# Patient Record
Sex: FEMALE | NOT HISPANIC OR LATINO
[De-identification: names, ages, dates, MRNs, and addresses within clinical notes are randomized per-mention and may not be internally consistent; named-entity substitution may affect disease eponyms.]

---

## 2021-08-16 PROBLEM — Z00.00 ENCOUNTER FOR PREVENTIVE HEALTH EXAMINATION: Status: ACTIVE | Noted: 2021-08-16

## 2021-08-18 ENCOUNTER — APPOINTMENT (OUTPATIENT)
Dept: GYNECOLOGIC ONCOLOGY | Facility: CLINIC | Age: 81
End: 2021-08-18
Payer: MEDICARE

## 2021-08-18 ENCOUNTER — LABORATORY RESULT (OUTPATIENT)
Age: 81
End: 2021-08-18

## 2021-08-18 ENCOUNTER — RESULT REVIEW (OUTPATIENT)
Age: 81
End: 2021-08-18

## 2021-08-18 VITALS
SYSTOLIC BLOOD PRESSURE: 156 MMHG | DIASTOLIC BLOOD PRESSURE: 105 MMHG | BODY MASS INDEX: 34.63 KG/M2 | HEIGHT: 58 IN | HEART RATE: 66 BPM | WEIGHT: 165 LBS

## 2021-08-18 DIAGNOSIS — I10 ESSENTIAL (PRIMARY) HYPERTENSION: ICD-10-CM

## 2021-08-18 DIAGNOSIS — E78.5 HYPERLIPIDEMIA, UNSPECIFIED: ICD-10-CM

## 2021-08-18 DIAGNOSIS — B37.2 CANDIDIASIS OF SKIN AND NAIL: ICD-10-CM

## 2021-08-18 DIAGNOSIS — Z78.9 OTHER SPECIFIED HEALTH STATUS: ICD-10-CM

## 2021-08-18 DIAGNOSIS — F03.90 UNSPECIFIED DEMENTIA W/OUT BEHAVIORAL DISTURBANCE: ICD-10-CM

## 2021-08-18 DIAGNOSIS — D61.818 OTHER PANCYTOPENIA: ICD-10-CM

## 2021-08-18 LAB
APTT BLD: 29.6 SEC
CANCER AG125 SERPL-ACNC: 90 U/ML
INR PPP: 1 RATIO
PT BLD: 11.8 SEC

## 2021-08-18 PROCEDURE — 99205 OFFICE O/P NEW HI 60 MIN: CPT

## 2021-08-18 RX ORDER — AMITRIPTYLINE HYDROCHLORIDE 75 MG/1
TABLET, FILM COATED ORAL
Refills: 0 | Status: ACTIVE | COMMUNITY

## 2021-08-18 RX ORDER — ASPIRIN 81 MG
81 TABLET, DELAYED RELEASE (ENTERIC COATED) ORAL
Refills: 0 | Status: ACTIVE | COMMUNITY

## 2021-08-18 RX ORDER — PERPHENAZINE 4 MG/1
4 TABLET ORAL
Refills: 0 | Status: ACTIVE | COMMUNITY

## 2021-08-18 RX ORDER — CHROMIUM 200 MCG
TABLET ORAL
Refills: 0 | Status: ACTIVE | COMMUNITY

## 2021-08-18 RX ORDER — LORAZEPAM 2 MG/1
TABLET ORAL
Refills: 0 | Status: ACTIVE | COMMUNITY

## 2021-08-18 RX ORDER — RABEPRAZOLE SODIUM 20 MG/1
TABLET, DELAYED RELEASE ORAL
Refills: 0 | Status: ACTIVE | COMMUNITY

## 2021-08-18 RX ORDER — CALCIUM CARBONATE 600 MG
TABLET ORAL
Refills: 0 | Status: ACTIVE | COMMUNITY

## 2021-08-18 RX ORDER — ATORVASTATIN CALCIUM 80 MG/1
TABLET, FILM COATED ORAL
Refills: 0 | Status: ACTIVE | COMMUNITY

## 2021-08-18 RX ORDER — NYSTATIN 100000 1/G
100000 POWDER TOPICAL
Qty: 60 | Refills: 2 | Status: ACTIVE | COMMUNITY
Start: 2021-08-18 | End: 1900-01-01

## 2021-08-18 RX ORDER — MULTIVIT-MIN/FA/LYCOPEN/LUTEIN .4-300-25
TABLET ORAL
Refills: 0 | Status: ACTIVE | COMMUNITY

## 2021-08-18 RX ORDER — DONEPEZIL HYDROCHLORIDE 23 MG/1
TABLET, FILM COATED ORAL
Refills: 0 | Status: ACTIVE | COMMUNITY

## 2021-08-18 RX ORDER — MELOXICAM 15 MG/1
TABLET ORAL
Refills: 0 | Status: ACTIVE | COMMUNITY

## 2021-08-19 LAB
ALBUMIN SERPL ELPH-MCNC: 4 G/DL
ALP BLD-CCNC: 110 U/L
ALT SERPL-CCNC: 17 U/L
ANION GAP SERPL CALC-SCNC: 14 MMOL/L
AST SERPL-CCNC: 40 U/L
BASOPHILS # BLD AUTO: 0.05 K/UL
BASOPHILS NFR BLD AUTO: 1.4 %
BILIRUB SERPL-MCNC: 0.7 MG/DL
BUN SERPL-MCNC: 17 MG/DL
CALCIUM SERPL-MCNC: 9.6 MG/DL
CHLORIDE SERPL-SCNC: 102 MMOL/L
CO2 SERPL-SCNC: 20 MMOL/L
CREAT SERPL-MCNC: 1.2 MG/DL
EOSINOPHIL # BLD AUTO: 0.09 K/UL
EOSINOPHIL NFR BLD AUTO: 2.5 %
GLUCOSE SERPL-MCNC: 93 MG/DL
HCT VFR BLD CALC: 38.1 %
HGB BLD-MCNC: 11.8 G/DL
IMM GRANULOCYTES NFR BLD AUTO: 0.8 %
LYMPHOCYTES # BLD AUTO: 0.99 K/UL
LYMPHOCYTES NFR BLD AUTO: 27 %
MAN DIFF?: NORMAL
MCHC RBC-ENTMCNC: 31 GM/DL
MCHC RBC-ENTMCNC: 33 PG
MCV RBC AUTO: 106.4 FL
MONOCYTES # BLD AUTO: 0.11 K/UL
MONOCYTES NFR BLD AUTO: 3 %
NEUTROPHILS # BLD AUTO: 2.4 K/UL
NEUTROPHILS NFR BLD AUTO: 65.3 %
PLATELET # BLD AUTO: 227 K/UL
POTASSIUM SERPL-SCNC: 4.8 MMOL/L
PROT SERPL-MCNC: 6.5 G/DL
RBC # BLD: 3.58 M/UL
RBC # FLD: 17.8 %
SODIUM SERPL-SCNC: 136 MMOL/L
WBC # FLD AUTO: 3.67 K/UL

## 2021-08-27 ENCOUNTER — APPOINTMENT (OUTPATIENT)
Dept: ULTRASOUND IMAGING | Facility: IMAGING CENTER | Age: 81
End: 2021-08-27
Payer: MEDICARE

## 2021-08-27 ENCOUNTER — TRANSCRIPTION ENCOUNTER (OUTPATIENT)
Age: 81
End: 2021-08-27

## 2021-08-27 ENCOUNTER — OUTPATIENT (OUTPATIENT)
Dept: OUTPATIENT SERVICES | Facility: HOSPITAL | Age: 81
LOS: 1 days | End: 2021-08-27
Payer: MEDICARE

## 2021-08-27 ENCOUNTER — RESULT REVIEW (OUTPATIENT)
Age: 81
End: 2021-08-27

## 2021-08-27 DIAGNOSIS — Z41.9 ENCOUNTER FOR PROCEDURE FOR PURPOSES OTHER THAN REMEDYING HEALTH STATE, UNSPECIFIED: Chronic | ICD-10-CM

## 2021-08-27 DIAGNOSIS — D61.818 OTHER PANCYTOPENIA: ICD-10-CM

## 2021-08-27 DIAGNOSIS — R59.9 ENLARGED LYMPH NODES, UNSPECIFIED: ICD-10-CM

## 2021-08-27 PROCEDURE — 88360 TUMOR IMMUNOHISTOCHEM/MANUAL: CPT | Mod: 26

## 2021-08-27 PROCEDURE — 20206 BIOPSY MUSCLE PERQ NEEDLE: CPT

## 2021-08-27 PROCEDURE — 88342 IMHCHEM/IMCYTCHM 1ST ANTB: CPT

## 2021-08-27 PROCEDURE — 88184 FLOWCYTOMETRY/ TC 1 MARKER: CPT

## 2021-08-27 PROCEDURE — 88172 CYTP DX EVAL FNA 1ST EA SITE: CPT

## 2021-08-27 PROCEDURE — 88305 TISSUE EXAM BY PATHOLOGIST: CPT | Mod: 26

## 2021-08-27 PROCEDURE — 88185 FLOWCYTOMETRY/TC ADD-ON: CPT

## 2021-08-27 PROCEDURE — 76942 ECHO GUIDE FOR BIOPSY: CPT

## 2021-08-27 PROCEDURE — 88342 IMHCHEM/IMCYTCHM 1ST ANTB: CPT | Mod: 26,59

## 2021-08-27 PROCEDURE — 88360 TUMOR IMMUNOHISTOCHEM/MANUAL: CPT

## 2021-08-27 PROCEDURE — 88173 CYTOPATH EVAL FNA REPORT: CPT

## 2021-08-27 PROCEDURE — 88189 FLOWCYTOMETRY/READ 16 & >: CPT

## 2021-08-27 PROCEDURE — 87205 SMEAR GRAM STAIN: CPT

## 2021-08-27 PROCEDURE — 88341 IMHCHEM/IMCYTCHM EA ADD ANTB: CPT

## 2021-08-27 PROCEDURE — 76942 ECHO GUIDE FOR BIOPSY: CPT | Mod: 26

## 2021-08-27 PROCEDURE — 88341 IMHCHEM/IMCYTCHM EA ADD ANTB: CPT | Mod: 26,59

## 2021-08-27 PROCEDURE — 88173 CYTOPATH EVAL FNA REPORT: CPT | Mod: 26

## 2021-08-27 PROCEDURE — 88305 TISSUE EXAM BY PATHOLOGIST: CPT

## 2021-08-30 LAB — CYTOLOGY CVX/VAG DOC THIN PREP: NORMAL

## 2021-08-31 LAB — TM INTERPRETATION: SIGNIFICANT CHANGE UP

## 2021-09-09 ENCOUNTER — OUTPATIENT (OUTPATIENT)
Dept: OUTPATIENT SERVICES | Facility: HOSPITAL | Age: 81
LOS: 1 days | Discharge: HOME | End: 2021-09-09

## 2021-09-09 DIAGNOSIS — Z41.9 ENCOUNTER FOR PROCEDURE FOR PURPOSES OTHER THAN REMEDYING HEALTH STATE, UNSPECIFIED: Chronic | ICD-10-CM

## 2021-09-09 DIAGNOSIS — D64.9 ANEMIA, UNSPECIFIED: ICD-10-CM

## 2021-09-13 ENCOUNTER — RESULT REVIEW (OUTPATIENT)
Age: 81
End: 2021-09-13

## 2021-09-13 ENCOUNTER — APPOINTMENT (OUTPATIENT)
Dept: HEMATOLOGY ONCOLOGY | Facility: CLINIC | Age: 81
End: 2021-09-13
Payer: MEDICARE

## 2021-09-13 VITALS
HEIGHT: 56.77 IN | RESPIRATION RATE: 19 BRPM | HEART RATE: 67 BPM | TEMPERATURE: 97.1 F | OXYGEN SATURATION: 98 % | WEIGHT: 165.79 LBS | SYSTOLIC BLOOD PRESSURE: 151 MMHG | DIASTOLIC BLOOD PRESSURE: 69 MMHG | BODY MASS INDEX: 36.27 KG/M2

## 2021-09-13 LAB
BASOPHILS # BLD AUTO: 0.04 K/UL — SIGNIFICANT CHANGE UP (ref 0–0.2)
BASOPHILS NFR BLD AUTO: 1 % — SIGNIFICANT CHANGE UP (ref 0–2)
EOSINOPHIL # BLD AUTO: 0.08 K/UL — SIGNIFICANT CHANGE UP (ref 0–0.5)
EOSINOPHIL NFR BLD AUTO: 2 % — SIGNIFICANT CHANGE UP (ref 0–6)
HCT VFR BLD CALC: 34.2 % — LOW (ref 34.5–45)
HGB BLD-MCNC: 11.7 G/DL — SIGNIFICANT CHANGE UP (ref 11.5–15.5)
LYMPHOCYTES # BLD AUTO: 0.92 K/UL — LOW (ref 1–3.3)
LYMPHOCYTES # BLD AUTO: 23 % — SIGNIFICANT CHANGE UP (ref 13–44)
MCHC RBC-ENTMCNC: 33.4 PG — SIGNIFICANT CHANGE UP (ref 27–34)
MCHC RBC-ENTMCNC: 34.2 G/DL — SIGNIFICANT CHANGE UP (ref 32–36)
MCV RBC AUTO: 97.7 FL — SIGNIFICANT CHANGE UP (ref 80–100)
MONOCYTES # BLD AUTO: 0.4 K/UL — SIGNIFICANT CHANGE UP (ref 0–0.9)
MONOCYTES NFR BLD AUTO: 10 % — SIGNIFICANT CHANGE UP (ref 2–14)
NEUTROPHILS # BLD AUTO: 2.55 K/UL — SIGNIFICANT CHANGE UP (ref 1.8–7.4)
NEUTROPHILS NFR BLD AUTO: 64 % — SIGNIFICANT CHANGE UP (ref 43–77)
NRBC # BLD: 1 /100 — HIGH (ref 0–0)
NRBC # BLD: SIGNIFICANT CHANGE UP /100 WBCS (ref 0–0)
PLAT MORPH BLD: NORMAL — SIGNIFICANT CHANGE UP
PLATELET # BLD AUTO: 166 K/UL — SIGNIFICANT CHANGE UP (ref 150–400)
RBC # BLD: 3.5 M/UL — LOW (ref 3.8–5.2)
RBC # FLD: 16.6 % — HIGH (ref 10.3–14.5)
RBC BLD AUTO: SIGNIFICANT CHANGE UP
WBC # BLD: 3.99 K/UL — SIGNIFICANT CHANGE UP (ref 3.8–10.5)
WBC # FLD AUTO: 3.99 K/UL — SIGNIFICANT CHANGE UP (ref 3.8–10.5)

## 2021-09-13 PROCEDURE — 99205 OFFICE O/P NEW HI 60 MIN: CPT

## 2021-09-13 RX ORDER — PERPHENAZINE AND AMITRIPTYLINE HYDROCHLORIDE 2; 10 MG/1; MG/1
2-10 TABLET, FILM COATED ORAL
Qty: 180 | Refills: 0 | Status: COMPLETED | COMMUNITY
Start: 2021-08-30

## 2021-09-13 RX ORDER — DONEPEZIL HYDROCHLORIDE 10 MG/1
10 TABLET ORAL
Qty: 90 | Refills: 0 | Status: COMPLETED | COMMUNITY
Start: 2021-08-25

## 2021-09-13 RX ORDER — NYSTATIN AND TRIAMCINOLONE ACETONIDE 100000; 1 MG/G; MG/G
100000-0.1 CREAM TOPICAL
Qty: 30 | Refills: 0 | Status: COMPLETED | COMMUNITY
Start: 2021-08-30

## 2021-09-13 RX ORDER — VALACYCLOVIR 1 G/1
1 TABLET, FILM COATED ORAL
Qty: 20 | Refills: 0 | Status: ACTIVE | COMMUNITY
Start: 2021-09-13 | End: 1900-01-01

## 2021-09-13 NOTE — ASSESSMENT
[FreeTextEntry1] : 81yo F w/ HTN, GERD, HLD, dementia on Aricept, RA on weekly MTX here for further management of newly diagnosed SLL. She has lost 30-40 lbs within the last year. \par Her CBC is unremarkable, no lymphocytosis. Will defer BMbx at this time. \par We will obtain PET/CT for staging. We discussed that if there is concern for transformation on imaging, may need to pursue another biopsy\par I suspect her ovarian mass is unlikely related to SLL. \par Consider treatment of SLL after PET/CT to see if any improvements in her symptoms. \par Will treat HSV w/ Valtrex x10d, then will give her suppressive therapy for her frequent recurrences. She is on MTX for RA, so is immunocompromised. \par All questions answered\par RTC after PET/CT completed

## 2021-09-13 NOTE — HISTORY OF PRESENT ILLNESS
[de-identified] : 79yo F w/ HTN, GERD, HLD, dementia on Aricept, RA on weekly MTX here for further management of newly diagnosed SLL.\par \par She was brought to the ED at [facility] in 7/2021 for exertional dyspnea a/w chest pain for over 6 months. Cardiac workup was unremarkable. Not long after, she then c/o left sided abdominal pain, lower extremity weakness. She went to Dr. Smith (GI) who ordered a CT. \par 8/11/2021- CT AP- \par  Liver/Gallbladder/Pancreas/Spleen/Kidneys/Bladder- Unremarkable\par  Thickening of bilateral adrenal glands\par  Gastrohepatic ligament adenopathy with LNs measuring up to 1.3 cm, there is periportal adenopathy with an enlarged 3.6 x 2 cm periportal node\par  There is bulky retroperitoneal adenopathy. There is a 2.7 x 1.7 cm left para-aortic node. There is a 3.1 x 2 cm aortocaval node. \par  There is bulky mesenteric lymphadenopathy. There is a 2.3 x 1.6 cm right mid abdominal mesenteric node\par  There is a 2.2 cm left ovarian cyst and a enlarged right ovary measuring 11.6 x 14.5 x 10.2 cm, with thickening, nodularity and septations. \par  There is an enlarged pelvic and inguinal LNs. There is a 2 x 1.3 cm left common iliac node. There is bilateral pelvic sidewall adenopathy measuring up to 3.9 x 1.7cm on the right. There are enlarged bilateral inguinal lymph nodes measuring up to 3.88 x 2.8 cm on the left. \par \par She lives alone. She lost about 30-40 pounds over the course of the last year, with decrease appetite. Also c/o constipation. \par She saw Dr. Leyva on 8/18 for evaluation. 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 s/p course of Valtrex. Using Acyclovir cream. It has been an ongoing issue for many years but is worse now and more frequent. \par

## 2021-09-13 NOTE — REASON FOR VISIT
[Initial Consultation] : an initial consultation for [Lymphoma] : lymphoma [Family Member] : family member

## 2021-09-13 NOTE — PHYSICAL EXAM
[Capable of only limited self care, confined to bed or chair more than 50% of waking hours] : Status 3- Capable of only limited self care, confined to bed or chair more than 50% of waking hours [Normal] : affect appropriate [de-identified] : left sided tenderness [de-identified] : b/l inguinal adenopathy [de-identified] : open shallow ulcer on R buttock. Also has dressing covering another lesion more midline. Scattered healed lesions

## 2021-09-13 NOTE — REVIEW OF SYSTEMS
[Recent Change In Weight] : ~T recent weight change [Chest Pain] : chest pain [Shortness Of Breath] : shortness of breath [Abdominal Pain] : abdominal pain [Constipation] : constipation [Negative] : Allergic/Immunologic [de-identified] : herpetic lesions on buttock

## 2021-09-17 ENCOUNTER — OUTPATIENT (OUTPATIENT)
Dept: OUTPATIENT SERVICES | Facility: HOSPITAL | Age: 81
LOS: 1 days | End: 2021-09-17
Payer: MEDICARE

## 2021-09-17 ENCOUNTER — APPOINTMENT (OUTPATIENT)
Dept: NUCLEAR MEDICINE | Facility: CLINIC | Age: 81
End: 2021-09-17
Payer: MEDICARE

## 2021-09-17 DIAGNOSIS — C83.00 SMALL CELL B-CELL LYMPHOMA, UNSPECIFIED SITE: ICD-10-CM

## 2021-09-17 DIAGNOSIS — Z41.9 ENCOUNTER FOR PROCEDURE FOR PURPOSES OTHER THAN REMEDYING HEALTH STATE, UNSPECIFIED: Chronic | ICD-10-CM

## 2021-09-17 PROCEDURE — A9552: CPT

## 2021-09-17 PROCEDURE — 78815 PET IMAGE W/CT SKULL-THIGH: CPT | Mod: 26,PI,MH

## 2021-09-17 PROCEDURE — 78815 PET IMAGE W/CT SKULL-THIGH: CPT

## 2021-09-20 DIAGNOSIS — B00.9 HERPESVIRAL INFECTION, UNSPECIFIED: ICD-10-CM

## 2021-09-20 RX ORDER — VALACYCLOVIR 500 MG/1
500 TABLET, FILM COATED ORAL
Qty: 60 | Refills: 3 | Status: ACTIVE | COMMUNITY
Start: 2021-09-20 | End: 1900-01-01

## 2021-09-24 ENCOUNTER — RESULT REVIEW (OUTPATIENT)
Age: 81
End: 2021-09-24

## 2021-09-24 ENCOUNTER — APPOINTMENT (OUTPATIENT)
Dept: ULTRASOUND IMAGING | Facility: IMAGING CENTER | Age: 81
End: 2021-09-24
Payer: MEDICARE

## 2021-09-24 ENCOUNTER — OUTPATIENT (OUTPATIENT)
Dept: OUTPATIENT SERVICES | Facility: HOSPITAL | Age: 81
LOS: 1 days | End: 2021-09-24
Payer: MEDICARE

## 2021-09-24 DIAGNOSIS — Z41.9 ENCOUNTER FOR PROCEDURE FOR PURPOSES OTHER THAN REMEDYING HEALTH STATE, UNSPECIFIED: Chronic | ICD-10-CM

## 2021-09-24 DIAGNOSIS — B00.9 HERPESVIRAL INFECTION, UNSPECIFIED: ICD-10-CM

## 2021-09-24 PROCEDURE — 38505 NEEDLE BIOPSY LYMPH NODES: CPT | Mod: LT

## 2021-09-24 PROCEDURE — 88342 IMHCHEM/IMCYTCHM 1ST ANTB: CPT

## 2021-09-24 PROCEDURE — 76942 ECHO GUIDE FOR BIOPSY: CPT

## 2021-09-24 PROCEDURE — 88341 IMHCHEM/IMCYTCHM EA ADD ANTB: CPT

## 2021-09-24 PROCEDURE — 88189 FLOWCYTOMETRY/READ 16 & >: CPT

## 2021-09-24 PROCEDURE — 88173 CYTOPATH EVAL FNA REPORT: CPT | Mod: 26

## 2021-09-24 PROCEDURE — 76942 ECHO GUIDE FOR BIOPSY: CPT | Mod: 26

## 2021-09-24 PROCEDURE — 88184 FLOWCYTOMETRY/ TC 1 MARKER: CPT

## 2021-09-24 PROCEDURE — 38505 NEEDLE BIOPSY LYMPH NODES: CPT

## 2021-09-24 PROCEDURE — 88172 CYTP DX EVAL FNA 1ST EA SITE: CPT

## 2021-09-24 PROCEDURE — 88342 IMHCHEM/IMCYTCHM 1ST ANTB: CPT | Mod: 26,59

## 2021-09-24 PROCEDURE — 88305 TISSUE EXAM BY PATHOLOGIST: CPT | Mod: 26

## 2021-09-24 PROCEDURE — 88341 IMHCHEM/IMCYTCHM EA ADD ANTB: CPT | Mod: 26,59

## 2021-09-24 PROCEDURE — 88185 FLOWCYTOMETRY/TC ADD-ON: CPT

## 2021-09-24 PROCEDURE — 88173 CYTOPATH EVAL FNA REPORT: CPT

## 2021-09-24 PROCEDURE — 88360 TUMOR IMMUNOHISTOCHEM/MANUAL: CPT | Mod: 26

## 2021-09-24 PROCEDURE — 88305 TISSUE EXAM BY PATHOLOGIST: CPT

## 2021-09-24 PROCEDURE — 88360 TUMOR IMMUNOHISTOCHEM/MANUAL: CPT

## 2021-09-24 PROCEDURE — 87205 SMEAR GRAM STAIN: CPT

## 2021-09-29 LAB — TM INTERPRETATION: SIGNIFICANT CHANGE UP

## 2021-10-04 ENCOUNTER — INPATIENT (INPATIENT)
Facility: HOSPITAL | Age: 81
LOS: 14 days | Discharge: NURSING, REHAB OR LONG-TERM CARE | DRG: 871 | End: 2021-10-19
Attending: INTERNAL MEDICINE | Admitting: INTERNAL MEDICINE
Payer: MEDICARE

## 2021-10-04 VITALS
SYSTOLIC BLOOD PRESSURE: 130 MMHG | DIASTOLIC BLOOD PRESSURE: 77 MMHG | RESPIRATION RATE: 20 BRPM | OXYGEN SATURATION: 98 % | HEIGHT: 56.5 IN | TEMPERATURE: 101 F | HEART RATE: 87 BPM

## 2021-10-04 DIAGNOSIS — A41.9 SEPSIS, UNSPECIFIED ORGANISM: ICD-10-CM

## 2021-10-04 DIAGNOSIS — Z41.9 ENCOUNTER FOR PROCEDURE FOR PURPOSES OTHER THAN REMEDYING HEALTH STATE, UNSPECIFIED: Chronic | ICD-10-CM

## 2021-10-04 LAB
ALBUMIN SERPL ELPH-MCNC: 3.3 G/DL — SIGNIFICANT CHANGE UP (ref 3.3–5)
ALP SERPL-CCNC: 113 U/L — SIGNIFICANT CHANGE UP (ref 40–120)
ALT FLD-CCNC: 21 U/L — SIGNIFICANT CHANGE UP (ref 10–45)
ANION GAP SERPL CALC-SCNC: 13 MMOL/L — SIGNIFICANT CHANGE UP (ref 5–17)
APPEARANCE UR: ABNORMAL
APTT BLD: 19.2 SEC — LOW (ref 27.5–35.5)
AST SERPL-CCNC: 82 U/L — HIGH (ref 10–40)
BACTERIA # UR AUTO: ABNORMAL
BASE EXCESS BLDV CALC-SCNC: 2.9 MMOL/L — HIGH (ref -2–2)
BASOPHILS # BLD AUTO: 0.06 K/UL — SIGNIFICANT CHANGE UP (ref 0–0.2)
BASOPHILS NFR BLD AUTO: 1.7 % — SIGNIFICANT CHANGE UP (ref 0–2)
BILIRUB SERPL-MCNC: 0.5 MG/DL — SIGNIFICANT CHANGE UP (ref 0.2–1.2)
BILIRUB UR-MCNC: NEGATIVE — SIGNIFICANT CHANGE UP
BUN SERPL-MCNC: 28 MG/DL — HIGH (ref 7–23)
CA-I SERPL-SCNC: 1.38 MMOL/L — HIGH (ref 1.15–1.33)
CALCIUM SERPL-MCNC: 10 MG/DL — SIGNIFICANT CHANGE UP (ref 8.4–10.5)
CHLORIDE BLDV-SCNC: 101 MMOL/L — SIGNIFICANT CHANGE UP (ref 96–108)
CHLORIDE SERPL-SCNC: 100 MMOL/L — SIGNIFICANT CHANGE UP (ref 96–108)
CO2 BLDV-SCNC: 28 MMOL/L — HIGH (ref 22–26)
CO2 SERPL-SCNC: 20 MMOL/L — LOW (ref 22–31)
COLOR SPEC: YELLOW — SIGNIFICANT CHANGE UP
CREAT SERPL-MCNC: 1.43 MG/DL — HIGH (ref 0.5–1.3)
DIFF PNL FLD: ABNORMAL
EOSINOPHIL # BLD AUTO: 0 K/UL — SIGNIFICANT CHANGE UP (ref 0–0.5)
EOSINOPHIL NFR BLD AUTO: 0 % — SIGNIFICANT CHANGE UP (ref 0–6)
EPI CELLS # UR: 20 /HPF — HIGH
GAS PNL BLDV: 130 MMOL/L — LOW (ref 136–145)
GAS PNL BLDV: SIGNIFICANT CHANGE UP
GAS PNL BLDV: SIGNIFICANT CHANGE UP
GIANT PLATELETS BLD QL SMEAR: PRESENT — SIGNIFICANT CHANGE UP
GLUCOSE BLDV-MCNC: 131 MG/DL — HIGH (ref 70–99)
GLUCOSE SERPL-MCNC: 128 MG/DL — HIGH (ref 70–99)
GLUCOSE UR QL: NEGATIVE — SIGNIFICANT CHANGE UP
HCO3 BLDV-SCNC: 27 MMOL/L — SIGNIFICANT CHANGE UP (ref 22–29)
HCT VFR BLD CALC: 32.9 % — LOW (ref 34.5–45)
HCT VFR BLDA CALC: 35 % — SIGNIFICANT CHANGE UP (ref 34.5–46.5)
HGB BLD CALC-MCNC: 11.5 G/DL — LOW (ref 11.7–16.1)
HGB BLD-MCNC: 10.8 G/DL — LOW (ref 11.5–15.5)
HYALINE CASTS # UR AUTO: 17 /LPF — HIGH (ref 0–2)
INR BLD: 0.97 RATIO — SIGNIFICANT CHANGE UP (ref 0.88–1.16)
KETONES UR-MCNC: NEGATIVE — SIGNIFICANT CHANGE UP
LACTATE BLDV-MCNC: 1.6 MMOL/L — SIGNIFICANT CHANGE UP (ref 0.7–2)
LEUKOCYTE ESTERASE UR-ACNC: NEGATIVE — SIGNIFICANT CHANGE UP
LYMPHOCYTES # BLD AUTO: 0.83 K/UL — LOW (ref 1–3.3)
LYMPHOCYTES # BLD AUTO: 23.5 % — SIGNIFICANT CHANGE UP (ref 13–44)
MANUAL SMEAR VERIFICATION: SIGNIFICANT CHANGE UP
MCHC RBC-ENTMCNC: 32.8 GM/DL — SIGNIFICANT CHANGE UP (ref 32–36)
MCHC RBC-ENTMCNC: 33.3 PG — SIGNIFICANT CHANGE UP (ref 27–34)
MCV RBC AUTO: 101.5 FL — HIGH (ref 80–100)
MONOCYTES # BLD AUTO: 0.15 K/UL — SIGNIFICANT CHANGE UP (ref 0–0.9)
MONOCYTES NFR BLD AUTO: 4.4 % — SIGNIFICANT CHANGE UP (ref 2–14)
NEUTROPHILS # BLD AUTO: 2.48 K/UL — SIGNIFICANT CHANGE UP (ref 1.8–7.4)
NEUTROPHILS NFR BLD AUTO: 70.4 % — SIGNIFICANT CHANGE UP (ref 43–77)
NITRITE UR-MCNC: POSITIVE
PCO2 BLDV: 39 MMHG — SIGNIFICANT CHANGE UP (ref 39–42)
PH BLDV: 7.45 — HIGH (ref 7.32–7.43)
PH UR: 6 — SIGNIFICANT CHANGE UP (ref 5–8)
PLAT MORPH BLD: NORMAL — SIGNIFICANT CHANGE UP
PLATELET # BLD AUTO: 160 K/UL — SIGNIFICANT CHANGE UP (ref 150–400)
PO2 BLDV: 38 MMHG — SIGNIFICANT CHANGE UP (ref 25–45)
POTASSIUM BLDV-SCNC: 4.3 MMOL/L — SIGNIFICANT CHANGE UP (ref 3.5–5.1)
POTASSIUM SERPL-MCNC: 4.2 MMOL/L — SIGNIFICANT CHANGE UP (ref 3.5–5.3)
POTASSIUM SERPL-SCNC: 4.2 MMOL/L — SIGNIFICANT CHANGE UP (ref 3.5–5.3)
PROT SERPL-MCNC: 6.3 G/DL — SIGNIFICANT CHANGE UP (ref 6–8.3)
PROT UR-MCNC: ABNORMAL
PROTHROM AB SERPL-ACNC: 11.7 SEC — SIGNIFICANT CHANGE UP (ref 10.6–13.6)
RAPID RVP RESULT: SIGNIFICANT CHANGE UP
RBC # BLD: 3.24 M/UL — LOW (ref 3.8–5.2)
RBC # FLD: 18 % — HIGH (ref 10.3–14.5)
RBC BLD AUTO: SIGNIFICANT CHANGE UP
RBC CASTS # UR COMP ASSIST: 3 /HPF — SIGNIFICANT CHANGE UP (ref 0–4)
SAO2 % BLDV: 62.5 % — LOW (ref 67–88)
SARS-COV-2 RNA SPEC QL NAA+PROBE: SIGNIFICANT CHANGE UP
SODIUM SERPL-SCNC: 133 MMOL/L — LOW (ref 135–145)
SP GR SPEC: 1.03 — HIGH (ref 1.01–1.02)
UROBILINOGEN FLD QL: NEGATIVE — SIGNIFICANT CHANGE UP
WBC # BLD: 3.52 K/UL — LOW (ref 3.8–10.5)
WBC # FLD AUTO: 3.52 K/UL — LOW (ref 3.8–10.5)
WBC UR QL: 14 /HPF — HIGH (ref 0–5)

## 2021-10-04 PROCEDURE — 71250 CT THORAX DX C-: CPT | Mod: 26

## 2021-10-04 PROCEDURE — 99284 EMERGENCY DEPT VISIT MOD MDM: CPT | Mod: CS,GC

## 2021-10-04 PROCEDURE — 72125 CT NECK SPINE W/O DYE: CPT | Mod: 26,MA

## 2021-10-04 PROCEDURE — 74177 CT ABD & PELVIS W/CONTRAST: CPT | Mod: 26,MA

## 2021-10-04 PROCEDURE — 71045 X-RAY EXAM CHEST 1 VIEW: CPT | Mod: 26

## 2021-10-04 PROCEDURE — 70450 CT HEAD/BRAIN W/O DYE: CPT | Mod: 26,MA

## 2021-10-04 PROCEDURE — 93010 ELECTROCARDIOGRAM REPORT: CPT | Mod: GC

## 2021-10-04 PROCEDURE — 73562 X-RAY EXAM OF KNEE 3: CPT | Mod: 26,50

## 2021-10-04 RX ORDER — CEFTRIAXONE 500 MG/1
1000 INJECTION, POWDER, FOR SOLUTION INTRAMUSCULAR; INTRAVENOUS ONCE
Refills: 0 | Status: COMPLETED | OUTPATIENT
Start: 2021-10-04 | End: 2021-10-04

## 2021-10-04 RX ORDER — ACETAMINOPHEN 500 MG
500 TABLET ORAL ONCE
Refills: 0 | Status: COMPLETED | OUTPATIENT
Start: 2021-10-04 | End: 2021-10-04

## 2021-10-04 RX ORDER — ACYCLOVIR 50 MG/G
1 OINTMENT TOPICAL
Qty: 0 | Refills: 0 | DISCHARGE

## 2021-10-04 RX ORDER — MAGNESIUM HYDROXIDE 400 MG/1
10 TABLET, CHEWABLE ORAL
Qty: 0 | Refills: 0 | DISCHARGE

## 2021-10-04 RX ORDER — OMEGA-3 ACID ETHYL ESTERS 1 G
1 CAPSULE ORAL
Qty: 0 | Refills: 0 | DISCHARGE

## 2021-10-04 RX ORDER — SODIUM CHLORIDE 9 MG/ML
1000 INJECTION INTRAMUSCULAR; INTRAVENOUS; SUBCUTANEOUS
Refills: 0 | Status: DISCONTINUED | OUTPATIENT
Start: 2021-10-04 | End: 2021-10-06

## 2021-10-04 RX ORDER — LIDOCAINE 4 G/100G
1 CREAM TOPICAL
Qty: 0 | Refills: 0 | DISCHARGE

## 2021-10-04 RX ORDER — SODIUM CHLORIDE 9 MG/ML
500 INJECTION, SOLUTION INTRAVENOUS
Refills: 0 | Status: COMPLETED | OUTPATIENT
Start: 2021-10-04 | End: 2021-10-04

## 2021-10-04 RX ORDER — ATORVASTATIN CALCIUM 80 MG/1
1 TABLET, FILM COATED ORAL
Qty: 0 | Refills: 0 | DISCHARGE

## 2021-10-04 RX ORDER — VALACYCLOVIR 500 MG/1
1 TABLET, FILM COATED ORAL
Qty: 0 | Refills: 0 | DISCHARGE

## 2021-10-04 RX ORDER — ACETAMINOPHEN 500 MG
650 TABLET ORAL ONCE
Refills: 0 | Status: COMPLETED | OUTPATIENT
Start: 2021-10-04 | End: 2021-10-04

## 2021-10-04 RX ORDER — ASPIRIN/CALCIUM CARB/MAGNESIUM 324 MG
1 TABLET ORAL
Qty: 0 | Refills: 0 | DISCHARGE

## 2021-10-04 RX ORDER — CEFTRIAXONE 500 MG/1
1000 INJECTION, POWDER, FOR SOLUTION INTRAMUSCULAR; INTRAVENOUS EVERY 24 HOURS
Refills: 0 | Status: DISCONTINUED | OUTPATIENT
Start: 2021-10-04 | End: 2021-10-06

## 2021-10-04 RX ORDER — CHOLECALCIFEROL (VITAMIN D3) 125 MCG
1 CAPSULE ORAL
Qty: 0 | Refills: 0 | DISCHARGE

## 2021-10-04 RX ORDER — NYSTATIN/TRIAMCINOLONE ACET
1 OINTMENT (GRAM) TOPICAL
Qty: 0 | Refills: 0 | DISCHARGE

## 2021-10-04 RX ORDER — FOLIC ACID 0.8 MG
1 TABLET ORAL
Qty: 0 | Refills: 0 | DISCHARGE

## 2021-10-04 RX ORDER — HEPARIN SODIUM 5000 [USP'U]/ML
5000 INJECTION INTRAVENOUS; SUBCUTANEOUS EVERY 8 HOURS
Refills: 0 | Status: DISCONTINUED | OUTPATIENT
Start: 2021-10-04 | End: 2021-10-19

## 2021-10-04 RX ORDER — ACETAMINOPHEN 500 MG
650 TABLET ORAL EVERY 6 HOURS
Refills: 0 | Status: DISCONTINUED | OUTPATIENT
Start: 2021-10-04 | End: 2021-10-11

## 2021-10-04 RX ORDER — METHOTREXATE 2.5 MG/1
5 TABLET ORAL
Qty: 0 | Refills: 0 | DISCHARGE

## 2021-10-04 RX ORDER — ALBUTEROL 90 UG/1
2 AEROSOL, METERED ORAL
Qty: 0 | Refills: 0 | DISCHARGE

## 2021-10-04 RX ORDER — RABEPRAZOLE 20 MG/1
1 TABLET, DELAYED RELEASE ORAL
Qty: 0 | Refills: 0 | DISCHARGE

## 2021-10-04 RX ORDER — NYSTATIN CREAM 100000 [USP'U]/G
1 CREAM TOPICAL
Qty: 0 | Refills: 0 | DISCHARGE

## 2021-10-04 RX ORDER — MULTIVIT-MIN/FERROUS GLUCONATE 9 MG/15 ML
1 LIQUID (ML) ORAL
Qty: 0 | Refills: 0 | DISCHARGE

## 2021-10-04 RX ORDER — INFLUENZA VIRUS VACCINE 15; 15; 15; 15 UG/.5ML; UG/.5ML; UG/.5ML; UG/.5ML
0.5 SUSPENSION INTRAMUSCULAR ONCE
Refills: 0 | Status: DISCONTINUED | OUTPATIENT
Start: 2021-10-04 | End: 2021-10-19

## 2021-10-04 RX ORDER — DONEPEZIL HYDROCHLORIDE 10 MG/1
1 TABLET, FILM COATED ORAL
Qty: 0 | Refills: 0 | DISCHARGE

## 2021-10-04 RX ADMIN — Medication 650 MILLIGRAM(S): at 18:56

## 2021-10-04 RX ADMIN — Medication 650 MILLIGRAM(S): at 08:35

## 2021-10-04 RX ADMIN — CEFTRIAXONE 1000 MILLIGRAM(S): 500 INJECTION, POWDER, FOR SOLUTION INTRAMUSCULAR; INTRAVENOUS at 10:05

## 2021-10-04 RX ADMIN — CEFTRIAXONE 100 MILLIGRAM(S): 500 INJECTION, POWDER, FOR SOLUTION INTRAMUSCULAR; INTRAVENOUS at 09:14

## 2021-10-04 RX ADMIN — SODIUM CHLORIDE 500 MILLILITER(S): 9 INJECTION, SOLUTION INTRAVENOUS at 09:20

## 2021-10-04 RX ADMIN — HEPARIN SODIUM 5000 UNIT(S): 5000 INJECTION INTRAVENOUS; SUBCUTANEOUS at 22:48

## 2021-10-04 RX ADMIN — Medication 200 MILLIGRAM(S): at 22:48

## 2021-10-04 RX ADMIN — Medication 650 MILLIGRAM(S): at 10:07

## 2021-10-04 RX ADMIN — SODIUM CHLORIDE 500 MILLILITER(S): 9 INJECTION, SOLUTION INTRAVENOUS at 10:55

## 2021-10-04 NOTE — ED PROVIDER NOTE - PHYSICAL EXAMINATION
GENERAL: Awake, alert, NAD  HEENT: NC/AT, moist mucous membranes, PERRL, EOMI  LUNGS: CTAB, no wheezes or crackles   CARDIAC: RRR  ABDOMEN: Soft, non tender, suprapubic pain, no rebound, no guarding  EXT: 1+ pitting edema, no calf tenderness, pain w/ flexion at both knees no pain with valgus / varus manipulation , no deformities.  NEURO: *exam limited to language barrier and pain, alert axox3, CN 2-12 intact,, sensation intact throughout, motor 4/5 in Upper extremities, Lower extremity strength limited due to pain  SKIN: Warm and dry. No rash.  PSYCH: Normal affect.

## 2021-10-04 NOTE — ED PROVIDER NOTE - NSICDXPASTMEDICALHX_GEN_ALL_CORE_FT
PAST MEDICAL HISTORY:  Asthma     RUSTY (generalized anxiety disorder)     HLD (hyperlipidemia)     HTN (hypertension)     RA (rheumatoid arthritis)

## 2021-10-04 NOTE — ED ADULT TRIAGE NOTE - CHIEF COMPLAINT QUOTE
recent diagnosis of CLL  pt with inc AMS/weakness  [place] recent diagnosis of CLL  pt fell to her knee today  pt with inc AMS/weakness  [place]

## 2021-10-04 NOTE — ED ADULT NURSE NOTE - OBJECTIVE STATEMENT
Pt is a 80 Y F, [place]ian speaking, daughter at bedside translates as patient was not responding to [place]ian , presenting to ED via EMS from home with c/o fever, burning upon urination, AMS, weakness, and fall today Pt was recently diagnosed with CLL 2 weeks ago, has not started chemotherapy yet. Per EMS, pt fell to her knees today, did not hit her head. Pt arrives to ED awake and alert, tachypneic, febrile orally in ED triage. Abd soft, non-tender, non-distended. Skin is hot to touch, dry. + Stage 3 and Stage 2 pressure ulcer to sacrum with purulent drainage at site. Pt moves all extremities spontaneously. IV established. Safety and comfort maintained. Red socks on.

## 2021-10-04 NOTE — ED ADULT NURSE REASSESSMENT NOTE - NS ED NURSE REASSESS COMMENT FT1
Pt is breathing unlabored on RA. Vital signs stable. Educated pt on plan of care. Safety and comfort maintained. Pt admitted to medicine, awaiting a bed. Call bell within reach.

## 2021-10-04 NOTE — ED PROVIDER NOTE - ATTENDING CONTRIBUTION TO CARE
80F hx of CLL, w newly diagnosis of ovarian mass, likely cancer, HTN, HLD, here with c/o fever, generalized weakness and fall out of bed onto knees yesterday AM. Also c/o urinary frequency. Denies head strike or LOC. No neck pain or back pain. +BL knee pain, no swelling or deformity. No LE edema. No cough. +tachypnea, NO sob. No NVD. Vaccinated for COVID x2. PE elderly F NAD RRR lungs CTA BL, +BS soft suprapubic and LLQ TTP no grr no distension. ext wwp. Moving all extremities, no FND. Sepsis W/U including bld cx, UC, RVP. CTAP given LLQ and suprapubic TTP to r/o intra-abd infxn. Xrays for r/o acute traumatic injury.

## 2021-10-04 NOTE — ED ADULT NURSE REASSESSMENT NOTE - NS ED NURSE REASSESS COMMENT FT1
Pt is breathing unlabored on RA, sleeping comfortably in stretcher. Educated pt's daughter on plan of care. Safety and comfort maintained. Awaiting results. Call bell within reach. Red socks on.

## 2021-10-04 NOTE — CONSULT NOTE ADULT - ASSESSMENT
Patient is a 80y female with a past history of HTN,HLD, RA, and anxiety who is being admitted to hospital  with fever and weakness associated with 2 falls yesterday.  She was hospitalized at [facility] a few months ago for a cardiac evaluation, the daughter reports that this was negative.  She has lost about 30 pounds over the past few months.She has had an OPD left inguinal  LN biopsy which revealed small cell lymphoma/CLL.  She also reportedly is being evaluated for ovarian cancer.She has been c/o lower abdominal pain.She developed fever yesterday with accelerated weakness and came to ER for evaluation.No headaches or confusion.She denies any  complaints.Fever to 101 prompted CTX in ER.No one ill at home. no recent travel, born in [place], moved to [place] 50 years ago.  Etiology of fever not clear."B" symptoms of lymphoma is possible although her decline in functional status seems acute.She has abdominal pain and bloating although CT scan is without acute pathology.She does not look toxic or septic.  Suggest:  1.Blood cultures x 2 sets  2.UA and culture  3.WNV serology, Quant TB serology  4.Favor Heme Onc evaluation if possible with concerns of possible lymphoma fever  5.CTX okay pending cultures Patient is a 80y female with a past history of HTN,HLD, RA, and anxiety who is being admitted to hospital  with fever and weakness associated with 2 falls yesterday.  She was hospitalized at [facility] a few months ago for a cardiac evaluation, the daughter reports that this was negative.  She has lost about 30 pounds over the past few months.She has had an OPD left inguinal  LN biopsy which revealed small cell lymphoma/CLL.  She also reportedly is being evaluated for ovarian cancer.She has been c/o lower abdominal pain.She developed fever yesterday with accelerated weakness and came to ER for evaluation.No headaches or confusion.She denies any  complaints.Fever to 101 prompted CTX in ER.No one ill at home. no recent travel, born in [place], moved to [place] 50 years ago.  Etiology of fever not clear."B" symptoms of lymphoma is possible although her decline in functional status seems acute.She has abdominal pain and bloating although CT scan is without acute pathology.She does not look toxic or septic.CXR negative, CT of A/P negative for source of infection  Suggest:  1.Blood cultures x 2 sets  2.UA and culture  3.WNV serology, Quant TB serology  4.Favor Heme Onc evaluation if possible with concerns of possible lymphoma fever  5.CTX okay pending cultures

## 2021-10-04 NOTE — H&P ADULT - HISTORY OF PRESENT ILLNESS
Patient is a 80 PMHx of new onset CLL, with possible staging of ovarian cancer, HTN, HLD, CC s/p fall and fevers.  Per daughter, pt lives at home alone, fell two times while getting out of bed. States she fell onto both hands and knees while rolling out of bed. No LOC.  Denied any lightheadedness, dizziness at the time of fall.  Currently states she has some dizziness, like she would fall over if she had to walk. Was brought in today due to having a fever. PT complaining of SOB as well, and has pain in both knees. Denies cough, headache, chest pain, nausea, vomiting. Has not started any chemotherapy per daughter

## 2021-10-04 NOTE — ED ADULT TRIAGE NOTE - INTERNATIONAL TRAVEL
Attending Addendum
Attending Brief Note
Patient seen and examined, denies any medical complaints at present.  Patient is
admitted to Inpatient Psychiatry suicidal ideation.  He does have history of 
hypertension.  He takes Toprol-XL for his hypertension.
 
Vital Signs
 
 
Date Time Temp Pulse Resp B/P B/P Pulse O2 O2 Flow FiO2
 
     Mean Ox Delivery Rate 
 
01/22 1414  83  139/95     
 
01/22 1141       Room Air  
 
01/22 1141 97.7 89 16 145/98     
 
01/22 1141 97.7 89  145/98     
 
01/22 0824 98.0 114 20 169/96  95 Room Air  
 
01/21 2309 98.2 98 18 142/91     
 
01/21 2309 98.2 98 18 142/91  94 Room Air  
 
01/21 1903 98.0 95 16 152/90  98 Room Air  
 
01/21 1829  96 20 148/103     
 
01/21 1718 97.6 93 16 139/78  98 Room Air  
 
 
on exam; 
aox3, nad. 
cv; s1,s2 rrr
resp; clear
abd; soft, nt, bs+
ext; no edema. 
 
 Laboratory Tests
 
 
 01/21 01/21 01/21
 
 1802 1706 1706
 
Chemistry   
 
  Sodium (137 - 145 mmol/L)   145
 
  Potassium (3.5 - 5.1 mmol/L)   3.9
 
  Chloride (98 - 107 mmol/L)   109  H
 
  Carbon Dioxide (22 - 30 mmol/L)   23
 
  Anion Gap (5 - 16)   13
 
  BUN (9 - 20 mg/dL)   15
 
  Creatinine (0.7 - 1.2 mg/dL)   1.2
 
  Estimated GFR (>60 ml/min)   > 60
 
  BUN/Creatinine Ratio (7 - 25 %)   12.5
 
  Glucose (65 - 99 mg/dL)   94
 
  Calcium (8.4 - 10.2 mg/dL)   9.7
 
  Total Bilirubin (0.2 - 1.3 mg/dL)   0.3
 
  AST (17 - 59 U/L)   20
 
  ALT (21 - 72 U/L)   45
 
  Alkaline Phosphatase (< 127 U/L)   51
 
  Total Protein (6.3 - 8.2 g/dL)   7.3
 
  Albumin (3.5 - 5.0 g/dL)   4.5
 
  Globulin (1.9 - 4.2 gm/dL)   2.8
 
  Albumin/Globulin Ratio (1.1 - 2.2 %)   1.6
 
Hematology   
 
  CBC w Diff   NO MAN DIFF REQ
 
  WBC (4.8 - 10.8 /CUMM)   6.3
 
  RBC (4.70 - 6.10 /CUMM)   5.38
 
  Hgb (14.0 - 18.0 G/DL)   14.8
 
  Hct (42 - 52 %)   45.0
 
  MCV (80.0 - 94.0 FL)   83.6
 
  MCH (27.0 - 31.0 PG)   27.6
 
  RDW (11.5 - 14.5 %)   13.4
 
  Plt Count (130 - 400 /CUMM)   236
 
  MPV (7.4 - 10.4 FL)   8.8
 
  Gran % (42.2 - 75.2 %)   63.8
 
  Lymphocytes % (20.5 - 51.1 %)   24.9
 
  Monocytes % (1.7 - 9.3 %)   7.2
 
  Eosinophils % (0 - 5 %)   3.9
 
  Basophils % (0.0 - 2.0 %)   0.2
 
  Absolute Granulocytes (1.4 - 6.5 /CUMM)   4.0
 
  Absolute Lymphocytes (1.2 - 3.4 /CUMM)   1.6
 
  Absolute Monocytes (0.10 - 0.60 /CUMM)   0.5
 
  Absolute Eosinophils (0.0 - 0.7 /CUMM)   0.2
 
  Absolute Basophils (0.0 - 0.2 /CUMM)   0
 
  PUBS MCHC (33.0 - 37.0 G/DL)   33.0
 
Toxicology   
 
  Urine Opiates Screen (>2000 NG/ML) < 100.00  
 
  Methadone Screen (>300 NG/ML) < 40  
 
  Barbiturate Screen (>200 NG/ML) < 60  
 
  Ur Phencyclidine Scrn (>25 NG/ML) < 6.00  
 
  Amphetamines Screen (>1000 NG/ML) < 100  
 
  U Benzodiazepines Scrn (>200 NG/ML) < 85  
 
  Lithium (0.6 - 1.2 mmol/L)   0.4  L
 
  Urine Cocaine Screen (>300 NG/ML) < 50  
 
  Urine Cannabis Screen (>50 NG/ML) < 5.00  
 
  Serum Alcohol (<10 MG/DL)  41.0 
 
 
A/P; 48-year-old male with past history significant for hypertension, bipolar 
disorder.  His admission for suicidal ideation is admitted to [facility] with suicidal 
ideation.
 
Agree with resuming patient's Toprol-XL.  I reviewed the labs and they look 
okay.  But monitor the blood pressure on his home dose.  For the psych 
management will be per psychiatry.
No

## 2021-10-04 NOTE — CONSULT NOTE ADULT - ASSESSMENT
Patient is a 80 PMHx of new onset CLL, with possible staging of ovarian cancer, HTN, HLD, CC s/p fall and fevers.  Per daughter, pt lives at home alone, fell two times while getting out of bed. States she fell onto both hands and knees while rolling out of bed. No LOC.  Denied any lightheadedness, dizziness at the time of fall.  Was brought in today due to having a fever. PT complaining of SOB as well, and has pain in both knees. Denies cough, headache, chest pain, nausea, vomiting. Has not started any chemotherapy per daughter. Has renal failure, denied h/o renal failure      A/P:  Renal failure:  Likely acute  no h/o renal failure per daughter  Baseline Scr unclear  UA has protein and blood, may have UTI  Urine sp gravity high suggest possible pre-renal  Monitor renal function at present  If worsens will do vasculitis work up  NS 50cc/hr  CXR has clear lung  urine na, cr    Hyponatremia  etiology?  Get urine Na, osmolality, serum osmolality  Monitor Na level daily    HTN:  not on antihypertensive meds  monitor at present

## 2021-10-04 NOTE — PATIENT PROFILE ADULT - MEDICATIONS/VISITS
Render Note In Bullet Format When Appropriate: No Duration Of Freeze Thaw-Cycle (Seconds): 0 Post-Care Instructions: I reviewed with the patient in detail post-care instructions. Patient is to wear sunprotection, and avoid picking at any of the treated lesions. Pt may apply Vaseline to crusted or scabbing areas. Detail Level: Simple Consent: The patient's consent was obtained including but not limited to risks of crusting, scabbing, blistering, scarring, darker or lighter pigmentary change, recurrence, incomplete removal and infection. no

## 2021-10-04 NOTE — ED ADULT NURSE NOTE - NSIMPLEMENTINTERV_GEN_ALL_ED
Implemented All Fall Risk Interventions:  [place] to call system. Call bell, personal items and telephone within reach. Instruct patient to call for assistance. Room bathroom lighting operational. Non-slip footwear when patient is off stretcher. Physically safe environment: no spills, clutter or unnecessary equipment. Stretcher in lowest position, wheels locked, appropriate side rails in place. Provide visual cue, wrist band, yellow gown, etc. Monitor gait and stability. Monitor for mental status changes and reorient to person, place, and time. Review medications for side effects contributing to fall risk. Reinforce activity limits and safety measures with patient and family.

## 2021-10-04 NOTE — H&P ADULT - ASSESSMENT
Patient is a 80 PMHx of new onset CLL, with possible staging of ovarian cancer, HTN, HLD, CC s/p fall and fevers.  Per daughter, pt lives at home alone, fell two times while getting out of bed. States she fell onto both hands and knees while rolling out of bed. No LOC.  Denied any lightheadedness, dizziness at the time of fall.  Currently states she has some dizziness, like she would fall over if she had to walk. Was brought in today due to having a fever. PT complaining of SOB as well, and has pain in both knees. Denies cough, headache, chest pain, nausea, vomiting. Has not started any chemotherapy per daughter    # AMS and dizziness   CT head noted  fall precautions  check orthostatics  Neurology eval called     # Sepsis  R/O sepsis  UTI   Pan cx ordered  cont rocpehin for now  monitor clinically   ID eval PRN   Hydration as tolertaed     # Ovarian Ca  with mets  seen on CT   onc follow up     # HTN   resume BP meds   adjust as tolerated     # HLD   lipid panel       DVT and GI PPX    Patient is a 80 PMHx of new onset CLL, with possible staging of ovarian cancer, HTN, HLD, CC s/p fall and fevers.  Per daughter, pt lives at home alone, fell two times while getting out of bed. States she fell onto both hands and knees while rolling out of bed. No LOC.  Denied any lightheadedness, dizziness at the time of fall.  Currently states she has some dizziness, like she would fall over if she had to walk. Was brought in today due to having a fever. PT complaining of SOB as well, and has pain in both knees. Denies cough, headache, chest pain, nausea, vomiting. Has not started any chemotherapy per daughter    # AMS and dizziness   CT head noted  fall precautions  check orthostatics  Neurology eval called     # Sepsis  R/O sepsis  UTI   Pan cx ordered  cont rocpehin for now  monitor clinically   ID eval PRN   Hydration as tolertaed     # HARJIT   IV hydration  Monitor renal function  I and Os     # Ovarian Ca  with mets  seen on CT   onc follow up     # HTN   resume BP meds   adjust as tolerated     # HLD   lipid panel       DVT and GI PPX

## 2021-10-04 NOTE — ED PROVIDER NOTE - CLINICAL SUMMARY MEDICAL DECISION MAKING FREE TEXT BOX
80F PMH CLL w/ possible ovarian cancer, s/p fall and new onset fever. Given clinical symptoms and exam, will further workup with sepsis work and imaging of knees

## 2021-10-04 NOTE — ED PROVIDER NOTE - OBJECTIVE STATEMENT
sensation is normal and strength is normal. 80 PMH new onset CLL, w/possible staging of ovarian cancer,HTN HLD, CC s/p fall and fevers. Pt accompanied by daughter, car, translated [phone].  Per daughter, pt lives at home alone, fell two times while getting out of bed. States she fell onto both hands and knees while rolling out of bed. No LOC.  Denied any lightheadedness, dizziness at the time of fall.  Currently states she has some dizziness, like she would fall over if she had to walk. Was brought in today due to having a fever. PT complaining of SOB as well, and has pain in both knees.   Denies cough, headache, chest pain, nausea, vomiting    Pt family request CLL and possible ovarian cancer dx not be mentioned to pt.   [employer] proxy  [phone] (other daughter) 80 PMH new onset CLL, w/possible staging of ovarian cancer,HTN HLD, CC s/p fall and fevers. Pt accompanied by daughter, car, translated [phone].  Per daughter, pt lives at home alone, fell two times while getting out of bed. States she fell onto both hands and knees while rolling out of bed. No LOC.  Denied any lightheadedness, dizziness at the time of fall.  Currently states she has some dizziness, like she would fall over if she had to walk. Was brought in today due to having a fever. PT complaining of SOB as well, and has pain in both knees.   Denies cough, headache, chest pain, nausea, vomiting    Has not started any chemotherapy per daughter    Pt family request CLL and possible ovarian cancer dx not be mentioned to pt.   [employer] proxy  [phone] (other daughter)

## 2021-10-04 NOTE — ED ADULT NURSE REASSESSMENT NOTE - NS ED NURSE REASSESS COMMENT FT1
Pt straight catheterized under sterile technique with 2 RN's at the bedside as per MD order. Pt tolerated well. Approximately 150 mL clear, yellow urine drained. UA/UC sent as per MD order. Safety and comfort maintained. Call bell within reach.

## 2021-10-05 LAB
A1C WITH ESTIMATED AVERAGE GLUCOSE RESULT: 5.2 % — SIGNIFICANT CHANGE UP (ref 4–5.6)
ALBUMIN SERPL ELPH-MCNC: 3.2 G/DL — LOW (ref 3.3–5)
ALP SERPL-CCNC: 115 U/L — SIGNIFICANT CHANGE UP (ref 40–120)
ALT FLD-CCNC: 29 U/L — SIGNIFICANT CHANGE UP (ref 10–45)
ANION GAP SERPL CALC-SCNC: 13 MMOL/L — SIGNIFICANT CHANGE UP (ref 5–17)
APPEARANCE UR: CLEAR — SIGNIFICANT CHANGE UP
AST SERPL-CCNC: 118 U/L — HIGH (ref 10–40)
BACTERIA # UR AUTO: NEGATIVE — SIGNIFICANT CHANGE UP
BILIRUB SERPL-MCNC: 0.4 MG/DL — SIGNIFICANT CHANGE UP (ref 0.2–1.2)
BILIRUB UR-MCNC: NEGATIVE — SIGNIFICANT CHANGE UP
BUN SERPL-MCNC: 32 MG/DL — HIGH (ref 7–23)
CALCIUM SERPL-MCNC: 9.5 MG/DL — SIGNIFICANT CHANGE UP (ref 8.4–10.5)
CHLORIDE SERPL-SCNC: 98 MMOL/L — SIGNIFICANT CHANGE UP (ref 96–108)
CHOLEST SERPL-MCNC: 87 MG/DL — SIGNIFICANT CHANGE UP
CO2 SERPL-SCNC: 20 MMOL/L — LOW (ref 22–31)
COD CRY URNS QL: SIGNIFICANT CHANGE UP
COLOR SPEC: YELLOW — SIGNIFICANT CHANGE UP
CREAT ?TM UR-MCNC: 77 MG/DL — SIGNIFICANT CHANGE UP
CREAT SERPL-MCNC: 1.52 MG/DL — HIGH (ref 0.5–1.3)
DIFF PNL FLD: ABNORMAL
EPI CELLS # UR: 2 /HPF — SIGNIFICANT CHANGE UP
ESTIMATED AVERAGE GLUCOSE: 103 MG/DL — SIGNIFICANT CHANGE UP (ref 68–114)
GLUCOSE SERPL-MCNC: 93 MG/DL — SIGNIFICANT CHANGE UP (ref 70–99)
GLUCOSE UR QL: NEGATIVE — SIGNIFICANT CHANGE UP
HCT VFR BLD CALC: 31 % — LOW (ref 34.5–45)
HDLC SERPL-MCNC: 15 MG/DL — LOW
HGB BLD-MCNC: 10.4 G/DL — LOW (ref 11.5–15.5)
HYALINE CASTS # UR AUTO: 1 /LPF — SIGNIFICANT CHANGE UP (ref 0–2)
KETONES UR-MCNC: NEGATIVE — SIGNIFICANT CHANGE UP
LEUKOCYTE ESTERASE UR-ACNC: NEGATIVE — SIGNIFICANT CHANGE UP
LIPID PNL WITH DIRECT LDL SERPL: 44 MG/DL — SIGNIFICANT CHANGE UP
MCHC RBC-ENTMCNC: 33.2 PG — SIGNIFICANT CHANGE UP (ref 27–34)
MCHC RBC-ENTMCNC: 33.5 GM/DL — SIGNIFICANT CHANGE UP (ref 32–36)
MCV RBC AUTO: 99 FL — SIGNIFICANT CHANGE UP (ref 80–100)
NITRITE UR-MCNC: NEGATIVE — SIGNIFICANT CHANGE UP
NON HDL CHOLESTEROL: 72 MG/DL — SIGNIFICANT CHANGE UP
NRBC # BLD: 0 /100 WBCS — SIGNIFICANT CHANGE UP (ref 0–0)
OSMOLALITY SERPL: 280 MOSMOL/KG — SIGNIFICANT CHANGE UP (ref 280–301)
OSMOLALITY UR: 509 MOS/KG — SIGNIFICANT CHANGE UP (ref 300–900)
PH UR: 6 — SIGNIFICANT CHANGE UP (ref 5–8)
PLATELET # BLD AUTO: 199 K/UL — SIGNIFICANT CHANGE UP (ref 150–400)
POTASSIUM SERPL-MCNC: 4.3 MMOL/L — SIGNIFICANT CHANGE UP (ref 3.5–5.3)
POTASSIUM SERPL-SCNC: 4.3 MMOL/L — SIGNIFICANT CHANGE UP (ref 3.5–5.3)
PROT SERPL-MCNC: 5.8 G/DL — LOW (ref 6–8.3)
PROT UR-MCNC: 100 — SIGNIFICANT CHANGE UP
RBC # BLD: 3.13 M/UL — LOW (ref 3.8–5.2)
RBC # FLD: 17.7 % — HIGH (ref 10.3–14.5)
RBC CASTS # UR COMP ASSIST: 2 /HPF — SIGNIFICANT CHANGE UP (ref 0–4)
SODIUM SERPL-SCNC: 131 MMOL/L — LOW (ref 135–145)
SODIUM UR-SCNC: 29 MMOL/L — SIGNIFICANT CHANGE UP
SP GR SPEC: 1.03 — HIGH (ref 1.01–1.02)
TRIGL SERPL-MCNC: 141 MG/DL — SIGNIFICANT CHANGE UP
TSH SERPL-MCNC: 1.15 UIU/ML — SIGNIFICANT CHANGE UP (ref 0.27–4.2)
UROBILINOGEN FLD QL: NEGATIVE — SIGNIFICANT CHANGE UP
WBC # BLD: 2.91 K/UL — LOW (ref 3.8–10.5)
WBC # FLD AUTO: 2.91 K/UL — LOW (ref 3.8–10.5)
WBC UR QL: 3 /HPF — SIGNIFICANT CHANGE UP (ref 0–5)

## 2021-10-05 PROCEDURE — 93010 ELECTROCARDIOGRAM REPORT: CPT

## 2021-10-05 RX ORDER — PERPHENAZINE 8 MG/1
2 TABLET, FILM COATED ORAL AT BEDTIME
Refills: 0 | Status: DISCONTINUED | OUTPATIENT
Start: 2021-10-05 | End: 2021-10-19

## 2021-10-05 RX ORDER — AMITRIPTYLINE HCL 25 MG
10 TABLET ORAL AT BEDTIME
Refills: 0 | Status: DISCONTINUED | OUTPATIENT
Start: 2021-10-05 | End: 2021-10-19

## 2021-10-05 RX ORDER — ASCORBIC ACID 60 MG
500 TABLET,CHEWABLE ORAL DAILY
Refills: 0 | Status: DISCONTINUED | OUTPATIENT
Start: 2021-10-05 | End: 2021-10-11

## 2021-10-05 RX ORDER — ACETAMINOPHEN 500 MG
650 TABLET ORAL EVERY 6 HOURS
Refills: 0 | Status: DISCONTINUED | OUTPATIENT
Start: 2021-10-05 | End: 2021-10-11

## 2021-10-05 RX ORDER — POLYETHYLENE GLYCOL 3350 17 G/17G
17 POWDER, FOR SOLUTION ORAL
Refills: 0 | Status: DISCONTINUED | OUTPATIENT
Start: 2021-10-05 | End: 2021-10-11

## 2021-10-05 RX ORDER — SENNA PLUS 8.6 MG/1
2 TABLET ORAL AT BEDTIME
Refills: 0 | Status: DISCONTINUED | OUTPATIENT
Start: 2021-10-05 | End: 2021-10-11

## 2021-10-05 RX ORDER — LIDOCAINE 4 G/100G
1 CREAM TOPICAL EVERY 24 HOURS
Refills: 0 | Status: DISCONTINUED | OUTPATIENT
Start: 2021-10-05 | End: 2021-10-19

## 2021-10-05 RX ADMIN — LIDOCAINE 1 PATCH: 4 CREAM TOPICAL at 19:46

## 2021-10-05 RX ADMIN — POLYETHYLENE GLYCOL 3350 17 GRAM(S): 17 POWDER, FOR SOLUTION ORAL at 17:09

## 2021-10-05 RX ADMIN — HEPARIN SODIUM 5000 UNIT(S): 5000 INJECTION INTRAVENOUS; SUBCUTANEOUS at 20:27

## 2021-10-05 RX ADMIN — SODIUM CHLORIDE 50 MILLILITER(S): 9 INJECTION INTRAMUSCULAR; INTRAVENOUS; SUBCUTANEOUS at 04:54

## 2021-10-05 RX ADMIN — PERPHENAZINE 2 MILLIGRAM(S): 8 TABLET, FILM COATED ORAL at 20:26

## 2021-10-05 RX ADMIN — CEFTRIAXONE 100 MILLIGRAM(S): 500 INJECTION, POWDER, FOR SOLUTION INTRAMUSCULAR; INTRAVENOUS at 10:55

## 2021-10-05 RX ADMIN — Medication 500 MILLIGRAM(S): at 17:09

## 2021-10-05 RX ADMIN — LIDOCAINE 1 PATCH: 4 CREAM TOPICAL at 17:09

## 2021-10-05 RX ADMIN — HEPARIN SODIUM 5000 UNIT(S): 5000 INJECTION INTRAVENOUS; SUBCUTANEOUS at 13:21

## 2021-10-05 RX ADMIN — Medication 10 MILLIGRAM(S): at 20:26

## 2021-10-05 RX ADMIN — Medication 2 MILLIGRAM(S): at 17:09

## 2021-10-05 RX ADMIN — Medication 650 MILLIGRAM(S): at 11:15

## 2021-10-05 RX ADMIN — SENNA PLUS 2 TABLET(S): 8.6 TABLET ORAL at 20:43

## 2021-10-05 RX ADMIN — Medication 1 TABLET(S): at 17:09

## 2021-10-05 RX ADMIN — HEPARIN SODIUM 5000 UNIT(S): 5000 INJECTION INTRAVENOUS; SUBCUTANEOUS at 05:02

## 2021-10-05 NOTE — PROGRESS NOTE ADULT - SUBJECTIVE AND OBJECTIVE BOX
Neurology Consult    Reason for Consult: Patient is a 80y old  Female who presents with a chief complaint of fever and falls (04 Oct 2021 17:31)      HPI:  81yo F with  new onset CLL, with possible staging of ovarian cancer, HTN, HLD, RA, anxiety, s/p fall and fevers.  Per daughter, pt lives at home alone, fell two times while getting out of bed. States she fell onto both hands and knees while rolling out of bed. No LOC.  Denied any lightheadedness, dizziness at the time of fall.  Currently states she has some dizziness, like she would fall over if she had to walk. Was brought in today due to having a fever. PT complaining of SOB as well, and has pain in both knees. Denies cough, headache, chest pain, nausea, vomiting. Has not started any chemotherapy per daughter (04 Oct 2021 15:49)       PAST MEDICAL & SURGICAL HISTORY:  HTN (hypertension)    HLD (hyperlipidemia)    Asthma    RA (rheumatoid arthritis)    RUSTY (generalized anxiety disorder)    Elective surgery  bunionectomy        Allergies: Allergies    penicillin (Unknown)    Intolerances    Biaxin (Other)      Social History: Denies toxic habits including tobacco, ETOH or illicit drugs.    Family History: FAMILY HISTORY:  . No family history of strokes    Medications: MEDICATIONS  (STANDING):  cefTRIAXone   IVPB 1000 milliGRAM(s) IV Intermittent every 24 hours  heparin   Injectable 5000 Unit(s) SubCutaneous every 8 hours  influenza   Vaccine 0.5 milliLiter(s) IntraMuscular once  sodium chloride 0.9%. 1000 milliLiter(s) (50 mL/Hr) IV Continuous <Continuous>    MEDICATIONS  (PRN):  acetaminophen   Tablet .. 650 milliGRAM(s) Oral every 6 hours PRN Temp greater or equal to 38C (100.4F)      Review of Systems:  CONSTITUTIONAL:  No weight loss, fever, chills, weakness or fatigue.  HEENT:  Eyes:  No visual loss, blurred vision, double vision or yellow sclera. Ears, Nose, Throat:  No hearing loss, sneezing, congestion, runny nose or sore throat.  SKIN:  No rash or itching.  CARDIOVASCULAR:  No chest pain, chest pressure or chest discomfort. No palpitations or edema.  RESPIRATORY:  No shortness of breath, cough or sputum.  GASTROINTESTINAL:  No anorexia, nausea, vomiting or diarrhea. No abdominal pain or blood.  GENITOURINARY:  No burning on urination or incontinence   NEUROLOGICAL:  No headache, dizziness, syncope, paralysis, ataxia, numbness or tingling in the extremities. No change in bowel or bladder control. no limb weakness. no vision changes.   MUSCULOSKELETAL:  No muscle, back pain, joint pain or stiffness.  HEMATOLOGIC:  No anemia, bleeding or bruising.  LYMPHATICS:  No enlarged nodes. No history of splenectomy.  PSYCHIATRIC:  No history of depression or anxiety.  ENDOCRINOLOGIC:  No reports of sweating, cold or heat intolerance. No polyuria or polydipsia.      Vitals:  Vital Signs Last 24 Hrs  T(C): 39.1 (05 Oct 2021 10:19), Max: 39.1 (05 Oct 2021 10:19)  T(F): 102.3 (05 Oct 2021 10:19), Max: 102.3 (05 Oct 2021 10:19)  HR: 85 (05 Oct 2021 10:19) (72 - 89)  BP: 147/74 (05 Oct 2021 10:19) (115/51 - 161/72)  BP(mean): 71 (04 Oct 2021 14:56) (71 - 71)  RR: 18 (05 Oct 2021 10:19) (18 - 22)  SpO2: 93% (05 Oct 2021 10:19) (93% - 99%)    General Exam:   General Appearance: Appropriately dressed and in no acute distress       Head: Normocephalic, atraumatic and no dysmorphic features  Ear, Nose, and Throat: Moist mucous membranes  CVS: S1S2+  Resp: No SOB, no wheeze or rhonchi  GI: soft NT/ND  Extremities: No edema or cyanosis  Skin: No bruises or rashes     Neurological Exam:  Mental Status: Awake, alert and oriented x 2.  Able to follow simple and complex verbal commands. Able to name and repeat. fluent speech. No obvious aphasia or dysarthria noted. masked facies. + frontal release sigbns   Cranial Nerves: PERRL, EOMI, VFFC, sensation V1-V3 intact,  L facial asymmetry, equal elevation of palate, scm/trap 5/5, tongue is midline on protrusion. no obvious papilledema on fundoscopic exam. hearing is grossly intact.   Motor:HASTINGS but + tremor L>R moves Uppers>lowers   Sensation: Intact to light touch and pinprick throughout. no right/left confusion. no extinction to tactile on DSS.   Reflexes: 1+ throughout at biceps, brachioradialis, triceps, patellars and ankles bilaterally and equal. No clonus. R toe and L toe were both downgoing.  Coordination: no dysmetria FNF  Gait: deferred     Data/Labs/Imaging which I personally reviewed.     Labs:     CBC Full  -  ( 04 Oct 2021 08:21 )  WBC Count : 3.52 K/uL  RBC Count : 3.24 M/uL  Hemoglobin : 10.8 g/dL  Hematocrit : 32.9 %  Platelet Count - Automated : 160 K/uL  Mean Cell Volume : 101.5 fl  Mean Cell Hemoglobin : 33.3 pg  Mean Cell Hemoglobin Concentration : 32.8 gm/dL  Auto Neutrophil # : 2.48 K/uL  Auto Lymphocyte # : 0.83 K/uL  Auto Monocyte # : 0.15 K/uL  Auto Eosinophil # : 0.00 K/uL  Auto Basophil # : 0.06 K/uL  Auto Neutrophil % : 70.4 %  Auto Lymphocyte % : 23.5 %  Auto Monocyte % : 4.4 %  Auto Eosinophil % : 0.0 %  Auto Basophil % : 1.7 %    10    131<L>  |  98  |  32<H>  ----------------------------<  93  4.3   |  20<L>  |  1.52<H>    Ca    9.5      05 Oct 2021 07:40    TPro  5.8<L>  /  Alb  3.2<L>  /  TBili  0.4  /  DBili  x   /  AST  118<H>  /  ALT  29  /  AlkPhos  115  10-05    LIVER FUNCTIONS - ( 05 Oct 2021 07:40 )  Alb: 3.2 g/dL / Pro: 5.8 g/dL / ALK PHOS: 115 U/L / ALT: 29 U/L / AST: 118 U/L / GGT: x           PT/INR - ( 04 Oct 2021 08:21 )   PT: 11.7 sec;   INR: 0.97 ratio         PTT - ( 04 Oct 2021 08:21 )  PTT:19.2 sec  Urinalysis Basic - ( 04 Oct 2021 08:57 )    Color: Yellow / Appearance: Turbid / S.026 / pH: x  Gluc: x / Ketone: Negative  / Bili: Negative / Urobili: Negative   Blood: x / Protein: 100 mg/dL / Nitrite: Positive   Leuk Esterase: Negative / RBC: 3 /hpf / WBC 14 /HPF   Sq Epi: x / Non Sq Epi: 20 /hpf / Bacteria: Many      < from: CT Head No Cont (10.04.21 @ 09:07) >    EXAM:  CT CERVICAL SPINE                          EXAM:  CT BRAIN                            PROCEDURE DATE:  10/04/2021            INTERPRETATION:  CLINICAL STATEMENT: Trauma..    TECHNIQUE: CT of the head and cervical spine were performed withoutIV contrast. 3-D/MIP images obtained    COMPARISON: None.    FINDINGS:  Head:  There is mild diffuse parenchymal volume loss. There are areas of low attenuation in the periventricular white matter likely related to mild chronic microvascular ischemicchanges.    There is no acute intracranial hemorrhage, parenchymal mass, mass effect or midline shift. There is no acute territorial infarct. There is no hydrocephalus. Partial empty sella    The cranium is intact.    Mild mucosal thickening right maxillary sinus    Cervical spine:  Vertebral body heights intact. Straightening of the cervical lordosis. Grade 1 retrolisthesis C3 on C4. Grade 1 anterolisthesis of C5 on C6    There is no prevertebral soft tissue swelling. The odontoid is intact.    Evaluation of the spinal canal is limited on a CT exam.  Multilevel degenerative changes noted resulting in multilevel spinal canal stenosis and neural foraminal narrowing.    Partially visualized cervical lymphadenopathy noted with multiple enlarged lymph nodes. Calcifications noted within the thyroid gland.    IMPRESSION:  No acute intracranial hemorrhage.    No acute fracture cervical spine. If pain persists, follow-up MRI exam recommended    Partially visualized cervical lymphadenopathy suspicious for lymphoma or metastatic disease. Correlate clinically    --- End of Report ---                ROBI RUTH MD; Attending Radiologist  This document has been electronically signed. Oct  4 2021  9:22AM    < end of copied text >

## 2021-10-05 NOTE — DIETITIAN INITIAL EVALUATION ADULT. - CHIEF COMPLAINT
This is a "80 PMHx of new onset CLL, with possible staging of ovarian cancer, HTN, HLD, CC s/p fall and fevers.  Per daughter, pt lives at home alone, fell two times while getting out of bed."

## 2021-10-05 NOTE — DIETITIAN INITIAL EVALUATION ADULT. - OTHER INFO
Reports pt has dentures but doesn't wear them all the time, believes pt consumes softer foods but unable elaborate further. Denies pt with issues swallowing. Amendable to trying soft diet, made aware of soft diet consistency available.     Weight: Granddaughter notes pt with significant weight loss but unable to recall UBW. Weights per [facility] as follows: 203lbs (12/2018), 171lbs (7/18/2021), 165lbs (9/13/21), current dosing weight is 169lbs. Will continue to monitor. Reports pt has dentures but doesn't wear them all the time, believes pt consumes softer foods but unable elaborate further. Denies pt with issues swallowing. Amendable to trying soft diet, made aware of softer diet consistency available as needed.     Weight: Granddaughter notes pt with significant weight loss but unable to recall UBW. Weights per [facility] as follows: 203lbs (12/2018), 171lbs (7/18/2021), 165lbs (9/13/21), current dosing weight is 169lbs. Pt ntoed with ~ 16% weight loss x 3 years, weight appears relatively stable x 1 year--not clinically significant,  will continue to monitor.    Pt admitted yesterday, lunch arrived during RD visit, granddaughter to encourage PO intake. Family amendable to patient receiving Ensure Enlive shakes (chocolate flavor preferred). Encouraged intake of protein rich foods as able for wound healing. Patient/family with no nutrition-related questions at this time. Made aware RD remains available as needed.

## 2021-10-05 NOTE — PROGRESS NOTE ADULT - ASSESSMENT
Patient is a 80 PMHx of new onset CLL, with possible staging of ovarian cancer, HTN, HLD, CC s/p fall and fevers.  Per daughter, pt lives at home alone, fell two times while getting out of bed. States she fell onto both hands and knees while rolling out of bed. No LOC.  Denied any lightheadedness, dizziness at the time of fall.  Was brought in today due to having a fever. PT complaining of SOB as well, and has pain in both knees. Denies cough, headache, chest pain, nausea, vomiting. Has not started any chemotherapy per daughter. Has renal failure, denied h/o renal failure      A/P:  HARJIT  Baseline Scr unclear  HARJIT possibly sec to contrast s/p 10/4   UA has protein and blood, may have UTI  Monitor renal function at present  Check URine Na , URine Cr  If worsens will do vasculitis work up  Avoid nephrotoxics, NSAIds RCA    Hyponatremia  etiology?  Check urine Na, osmolality, serum osmolality  Monitor Na level daily                     Change antibiotics to NS instead of D5 if possible    HTN:  not on antihypertensive meds  monitor at present       Proteinuria/Hematuria  in setting of UTI  Repeat UA post tx   Ct A/P with no renal mass

## 2021-10-05 NOTE — DIETITIAN INITIAL EVALUATION ADULT. - ETIOLOGY
related to inadequate protein-energy intake in setting of decreased appetite, new dx of small cell lymphoma/CLL related to increased physiological demand for nutrients for wound healing

## 2021-10-05 NOTE — PROGRESS NOTE ADULT - ASSESSMENT
Patient is a 80 PMHx of new onset CLL, with possible staging of ovarian cancer, HTN, HLD, CC s/p fall and fevers.  Per daughter, pt lives at home alone, fell two times while getting out of bed. States she fell onto both hands and knees while rolling out of bed. No LOC.  Denied any lightheadedness, dizziness at the time of fall.  Currently states she has some dizziness, like she would fall over if she had to walk. Was brought in today due to having a fever. PT complaining of SOB as well, and has pain in both knees. Denies cough, headache, chest pain, nausea, vomiting. Has not started any chemotherapy per daughter    # AMS and dizziness   CT head noted  fall precautions  check orthostatics  Neurology eval appreciated follow up recs    # Sepsis  R/O sepsis  UTI vs tumor fever vs other etiologies   Pan cx ordered  cont rocpehin for now  monitor clinically   ID eval appreciated   Hydration as tolerated  Pan CT noted      # HARJIT   IV hydration  Monitor renal function  I and Os     # Ovarian Ca  with mets  seen on CT   onc follow up   Onc eval called, patient follows with TRUNG, will call     # HTN   resume BP meds   adjust as tolerated     # HLD   lipid panel       DVT and GI PPX

## 2021-10-05 NOTE — DIETITIAN INITIAL EVALUATION ADULT. - SIGNS/SYMPTOMS
meeting < 75% of estimated needs >/= 1 month  mild edema, mild muscle/fat loss pt with multiple stage 3 pressure injuries per nursing flow sheets

## 2021-10-05 NOTE — CONSULT NOTE ADULT - ASSESSMENT
Patient is a 80 PMHx of new onset CLL, with possible staging of ovarian cancer, HTN, HLD, CC s/p fall and fevers.  Per daughter, pt lives at home alone, fell two times while getting out of bed. States she fell onto both hands and knees while rolling out of bed. No LOC.  Denied any lightheadedness, dizziness at the time of fall.  Currently states she has some dizziness, like she would fall over if she had to walk. Was brought in today due to having a fever. PT complaining of SOB as well, and has pain in both knees. Denies cough, headache, chest pain, nausea, vomiting. Has not started any chemotherapy per daughter (04 Oct 2021 15:49)    Hematology/Oncology consulted for patient with recent diagnosis of SLL with extensive lymphadenopathy and an ovarian cystic mass of unclear etiology. Patient was unable to give history so we spoke with her daughter, Francesca, who has informed us that the patient is under care by Dr. Raven Ayoub, [facility] Hematologist for the SLL and by Dr. Martha Leyva for the ovarian lesion (also a [facility] Oncologist).     Small Lymphocytic Lymphoma  --Recent diagnosis on lymph node biopsy  --Under Care by Dr. Raven Ayoub from [facility]  --Please contact Dr. Ayoub for continuation of care    Bilateral Ovarian Cystic Masses  --Have not been biopsied  --Unclear whether malignant  --Patient follows with Dr. Martha Leyva at [facility]  --Please contact Dr. Leyva for continuation of care    Fevers, falls  --Management per primary team    Thank you for the opportunity to participate in Ms. Quezada's care    Shin Rob PA-C  Hematology/Oncology  [facility]   [phone] (cell)  [phone] (office)  [phone] (alt office)  Evenings and weekends please call MD on call or office

## 2021-10-05 NOTE — CHART NOTE - NSCHARTNOTEFT_GEN_A_CORE
Called by RN to see patient c/o chest pain. Seen the patient, grand daughter at bedside. Patient c/o pain pain L side of chest  non radiating and 8/10 on a 1-10 scale. Denies N/V  dizziness or palpitation.   Patient is a 80y old  Female who presents with a chief complaint of AMS (05 Oct 2021 11:51)      Vital Signs Last 24 Hrs  T(C): 36.9 (05 Oct 2021 15:29), Max: 39.1 (05 Oct 2021 10:19)  T(F): 98.4 (05 Oct 2021 15:29), Max: 102.3 (05 Oct 2021 10:19)  HR: 79 (05 Oct 2021 15:29) (72 - 89)  BP: 115/75 (05 Oct 2021 15:29) (115/75 - 161/72)  BP(mean): --  RR: 20 (05 Oct 2021 15:29) (18 - 20)  SpO2: 95% (05 Oct 2021 15:29) (93% - 99%)      Labs:                          10.4   2.91  )-----------( 199      ( 05 Oct 2021 11:42 )             31.0     10-05    131<L>  |  98  |  32<H>  ----------------------------<  93  4.3   |  20<L>  |  1.52<H>    Ca    9.5      05 Oct 2021 07:40    TPro  5.8<L>  /  Alb  3.2<L>  /  TBili  0.4  /  DBili  x   /  AST  118<H>  /  ALT  29  /  AlkPhos  115  10-05            Radiology:    Physical Exam:  General: WN/WD NAD  Neurology: A&Ox3, nonfocal, HASTINGS x 4  Head:  Normocephalic, atraumatic  Respiratory: CTA B/L  CV: RRR, S1S2,   Abdominal: Soft, NT, ND no palpable mass  MSK:  C/O pain to touch especially L upper chest. No edema, + peripheral pulses, FROM all 4 extremity    Assessment & Plan:  Patient is a 80 PMHx of new onset CLL, with possible staging of ovarian cancer, HTN, HLD, CC s/p fall and fevers.  Per daughter, pt lives at home alone, fell two times while getting out of bed. States she fell onto both hands and knees while rolling out of bed. No LOC.  Denied any lightheadedness, dizziness at the time of fall.  Currently states she has some dizziness, like she would fall over if she had to walk. Was brought in today due to having a fever. PT complaining of SOB as well, and has pain in both knees. Denies cough, headache, chest pain, nausea, vomiting. Has not started any chemotherapy per daughter (04 Oct 2021 15:49)  Now c/o chest pain, also family says she has a h/o anxiety and on Ativan BID  1) Chest pain          Likely musculoskeletal          Tylenol for pain          EKG with no acute changes  2) Anxiety          Resume home dose of ativan(I stop ref # [id number])  The above plan DW Dr Smith and he agreed.  melissa andujar NP  [phone]

## 2021-10-05 NOTE — PROGRESS NOTE ADULT - SUBJECTIVE AND OBJECTIVE BOX
CC: f/u for fever    Patient reports nothing specific but says "I dont feel good". Nods yes to some cough & some abdominal discomfort    REVIEW OF SYSTEMS:  Limited except as above    Antimicrobials Day #  : 2  cefTRIAXone   IVPB 1000 milliGRAM(s) IV Intermittent every 24 hours    Other Medications Reviewed    T(F): 98.5 (10-05-21 @ 05:13), Max: 101.4 (10-04-21 @ 21:42)  HR: 73 (10-05-21 @ 05:13)  BP: 141/78 (10-05-21 @ 05:13)  RR: 18 (10-05-21 @ 05:13)  SpO2: 96% (10-05-21 @ 05:13)  Wt(kg): --    PHYSICAL EXAM:  General: alert, no acute distress  Eyes:  anicteric, no conjunctival injection, no discharge  Oropharynx: no lesions or injection 	  Neck: supple, without adenopathy  Lungs: clear to auscultation  Heart: regular rate and rhythm; no murmurs  Abdomen: soft, nondistended, nontender, without mass or organomegaly  Skin: no lesions  Extremities: no clubbing, cyanosis, or edema  Neurologic: alert, oriented, moves all extremities    LAB RESULTS:                        10.8   3.52  )-----------( 160      ( 04 Oct 2021 08:21 )             32.9     10-05    131<L>  |  98  |  32<H>  ----------------------------<  93  4.3   |  20<L>  |  1.52<H>    Ca    9.5      05 Oct 2021 07:40    TPro  5.8<L>  /  Alb  3.2<L>  /  TBili  0.4  /  DBili  x   /  AST  118<H>  /  ALT  29  /  AlkPhos  115  10-05    LIVER FUNCTIONS - ( 05 Oct 2021 07:40 )  Alb: 3.2 g/dL / Pro: 5.8 g/dL / ALK PHOS: 115 U/L / ALT: 29 U/L / AST: 118 U/L / GGT: x           Urinalysis Basic - ( 04 Oct 2021 08:57 )    Color: Yellow / Appearance: Turbid / S.026 / pH: x  Gluc: x / Ketone: Negative  / Bili: Negative / Urobili: Negative   Blood: x / Protein: 100 mg/dL / Nitrite: Positive   Leuk Esterase: Negative / RBC: 3 /hpf / WBC 14 /HPF   Sq Epi: x / Non Sq Epi: 20 /hpf / Bacteria: Many      MICROBIOLOGY:  RECENT CULTURES:        RADIOLOGY REVIEWED:  from: CT Abdomen and Pelvis w/ IV Cont (10.04.21 @ 09:30)  Enlarged bilateral inguinal, iliac, mesenteric, and retroperitoneal lymph nodes. Partially imaged enlarged right axillary lymph nodes. Primary consideration is lymphoma. Metastatic disease is not excluded.   Multiloculated right adnexal cystic mass measuring 14.6 x 9.0 cm, which abuts the appendix. Left adnexal cyst measures 2.1 x 1.4 cm

## 2021-10-05 NOTE — PROGRESS NOTE ADULT - SUBJECTIVE AND OBJECTIVE BOX
Name of Patient : PREM KAY  MRN: [id number]  Date of visit: 10-05-21       Subjective: Patient seen and examined. No new events except as noted.   episodes of fever overnight   abdoinal pain       REVIEW OF SYSTEMS:    CONSTITUTIONAL: + weakness  EYES/ENT: No visual changes;  No vertigo or throat pain   NECK: No pain or stiffness  RESPIRATORY: No cough, wheezing, hemoptysis; No shortness of breath  CARDIOVASCULAR: No chest pain or palpitations  GASTROINTESTINAL: + abdominal pain   GENITOURINARY: No dysuria, frequency or hematuria  NEUROLOGICAL: No numbness or weakness  SKIN: No itching, burning, rashes, or lesions   All other review of systems is negative unless indicated above.    MEDICATIONS:  MEDICATIONS  (STANDING):  ascorbic acid 500 milliGRAM(s) Oral daily  cefTRIAXone   IVPB 1000 milliGRAM(s) IV Intermittent every 24 hours  heparin   Injectable 5000 Unit(s) SubCutaneous every 8 hours  influenza   Vaccine 0.5 milliLiter(s) IntraMuscular once  lidocaine   4% Patch 1 Patch Transdermal every 24 hours  multivitamin 1 Tablet(s) Oral daily  polyethylene glycol 3350 17 Gram(s) Oral two times a day  senna 2 Tablet(s) Oral at bedtime  sodium chloride 0.9%. 1000 milliLiter(s) (50 mL/Hr) IV Continuous <Continuous>      PHYSICAL EXAM:  T(C): 36.9 (10-05-21 @ 15:29), Max: 39.1 (10-05-21 @ 10:19)  HR: 79 (10-05-21 @ 15:29) (72 - 89)  BP: 115/75 (10-05-21 @ 15:29) (115/75 - 161/72)  RR: 20 (10-05-21 @ 15:29) (18 - 20)  SpO2: 95% (10-05-21 @ 15:29) (93% - 99%)  Wt(kg): --  I&O's Summary    04 Oct 2021 07:  -  05 Oct 2021 07:00  --------------------------------------------------------  IN: 350 mL / OUT: 0 mL / NET: 350 mL    05 Oct 2021 07:01  -  05 Oct 2021 16:05  --------------------------------------------------------  IN: 300 mL / OUT: 300 mL / NET: 0 mL        Weight (kg): 77 (10-04 @ 21:42)    Appearance: Normal	  HEENT:  PERRLA   Lymphatic: No lymphadenopathy   Cardiovascular: Normal S1 S2, no JVD  Respiratory: normal effort , clear  Gastrointestinal:  Soft, pain on palpaiton   Skin: No rashes,  warm to touch  Psychiatry:  Mood & affect appropriate  Musculuskeletal: No edema      All labs, Imaging and EKGs personally reviewed       10-04-21 @ 07:01  -  10-05-21 @ 07:00  --------------------------------------------------------  IN: 350 mL / OUT: 0 mL / NET: 350 mL    10-05-21 @ 07:01  -  10-05-21 @ 16:05  --------------------------------------------------------  IN: 300 mL / OUT: 300 mL / NET: 0 mL                          10.4   2.91  )-----------( 199      ( 05 Oct 2021 11:42 )             31.0               10-05    131<L>  |  98  |  32<H>  ----------------------------<  93  4.3   |  20<L>  |  1.52<H>    Ca    9.5      05 Oct 2021 07:40    TPro  5.8<L>  /  Alb  3.2<L>  /  TBili  0.4  /  DBili  x   /  AST  118<H>  /  ALT  29  /  AlkPhos  115  1005    PT/INR - ( 04 Oct 2021 08:21 )   PT: 11.7 sec;   INR: 0.97 ratio         PTT - ( 04 Oct 2021 08:21 )  PTT:19.2 sec                   Urinalysis Basic - ( 04 Oct 2021 08:57 )    Color: Yellow / Appearance: Turbid / S.026 / pH: x  Gluc: x / Ketone: Negative  / Bili: Negative / Urobili: Negative   Blood: x / Protein: 100 mg/dL / Nitrite: Positive   Leuk Esterase: Negative / RBC: 3 /hpf / WBC 14 /HPF   Sq Epi: x / Non Sq Epi: 20 /hpf / Bacteria: Many

## 2021-10-05 NOTE — PROGRESS NOTE ADULT - SUBJECTIVE AND OBJECTIVE BOX
[facility] [place] [facility]   MD LAN RIVERO MD RUORU WONG, PA    TEL:  OFFICE: [phone]  DR VILLASENOR CELL: [phone]  HERMELINDA COLEMAN CELL: [phone]  DR. CAMARA CELL: [phone]      FROM 5 PM - 7 AM PLEASE CALL ANSWERING SERVICE: [phone]    RENAL FOLLOW UP NOTE--Date of Service 10-05-21 @ 08:27  --------------------------------------------------------------------------------  HPI:      Pt seen and examined at bedside.       PAST HISTORY  --------------------------------------------------------------------------------  No significant changes to PMH, PSH, FHx, SHx, unless otherwise noted    ALLERGIES & MEDICATIONS  --------------------------------------------------------------------------------  Allergies    penicillin (Unknown)    Intolerances    Biaxin (Other)    Standing Inpatient Medications  cefTRIAXone   IVPB 1000 milliGRAM(s) IV Intermittent every 24 hours  heparin   Injectable 5000 Unit(s) SubCutaneous every 8 hours  influenza   Vaccine 0.5 milliLiter(s) IntraMuscular once  sodium chloride 0.9%. 1000 milliLiter(s) IV Continuous <Continuous>    PRN Inpatient Medications  acetaminophen   Tablet .. 650 milliGRAM(s) Oral every 6 hours PRN      REVIEW OF SYSTEMS  --------------------------------------------------------------------------------  General: no fever  MSK: no edema     VITALS/PHYSICAL EXAM  --------------------------------------------------------------------------------  T(C): 36.9 (10-05-21 @ 05:13), Max: 38.6 (10-04-21 @ 21:42)  HR: 73 (10-05-21 @ 05:13) (72 - 89)  BP: 141/78 (10-05-21 @ 05:13) (108/42 - 161/72)  RR: 18 (10-05-21 @ 05:13) (18 - 27)  SpO2: 96% (10-05-21 @ 05:13) (95% - 99%)  Wt(kg): --  Height (cm): 143.5 (10-04-21 @ 07:34)  Weight (kg): 77 (10-04-21 @ 21:42)  BMI (kg/m2): 37.4 (10-04-21 @ 21:42)  BSA (m2): 1.67 (10-04-21 @ 21:42)      10-04-21 @ 07:01  -  10-05-21 @ 07:00  --------------------------------------------------------  IN: 350 mL / OUT: 0 mL / NET: 350 mL      Physical Exam:  	Gen: NAD  	HEENT: MMM  	Pulm: CTA B/L  	CV: S1S2  	Abd: Soft, +BS  	Ext: No LE edema B/L                      Neuro: Awake   	Skin: Warm and Dry   	Vascular access: NO HD catheter            no  majano  LABS/STUDIES  --------------------------------------------------------------------------------              10.8   3.52  >-----------<  160      [10-04-21 @ 08:21]              32.9     131  |  98  |  32  ----------------------------<  93      [10-05-21 @ 07:40]  4.3   |  20  |  1.52        Ca     9.5     [10-05-21 @ 07:40]    TPro  5.8  /  Alb  3.2  /  TBili  0.4  /  DBili  x   /  AST  118  /  ALT  29  /  AlkPhos  115  [10-05-21 @ 07:40]    PT/INR: PT 11.7 , INR 0.97       [10-04-21 @ 08:21]  PTT: 19.2       [10-04-21 @ 08:21]      Creatinine Trend:  SCr 1.52 [10-05 @ 07:40]  SCr 1.43 [10-04 @ 08:21]    Urinalysis - [10-04-21 @ 08:57]      Color Yellow / Appearance Turbid / SG 1.026 / pH 6.0      Gluc Negative / Ketone Negative  / Bili Negative / Urobili Negative       Blood Large / Protein 100 mg/dL / Leuk Est Negative / Nitrite Positive      RBC 3 / WBC 14 / Hyaline 17 / Gran  / Sq Epi  / Non Sq Epi 20 / Bacteria Many

## 2021-10-05 NOTE — PHYSICAL THERAPY INITIAL EVALUATION ADULT - PERTINENT HX OF CURRENT PROBLEM, REHAB EVAL
80F hx of CLL, w newly diagnosis of ovarian mass, likely cancer, HTN, HLD, here with c/o fever, generalized weakness and fall out of bed. Also c/o urinary frequency, Dx: Sepsis 2/2 to UTI - CTX, s/p fall; imaging neg for fx + dislocation

## 2021-10-05 NOTE — PROGRESS NOTE ADULT - ASSESSMENT
81yo F with  new onset CLL, with possible staging of ovarian cancer, HTN, HLD, RA, anxiety, s/p fall and fevers.   AMS likely 2/2 infection.  dizziness better + UTI  CTH and CT c spien unremarkable   - ceftriaxone for infection /UTI  - f/u CT C A P  - heme/onc for CLL  - HARJIT, IVF  - check lipid panel.  - MRI brain w/ and w/o if in agreement with GOC  - check orthostatics   - check b12   - PT/OT/SS/SLP, OOBC  - check FS, glucose control <180  - GI/DVT ppx  - Counseling on diet, exercise, and medication adherence was done  - Counseling on smoking cessation and alcohol consumption offered when appropriate.  - Pain assessed and judicious use of narcotics when appropriate was discussed.    - Stroke education given when appropriate.  - Importance of fall prevention discussed.   - Differential diagnosis and plan of care discussed with patient and/or family and primary team  - Thank you for allowing me to participate in the care of this patient. Call with questions.   Sherman Thompson MD  Vascular Neurology  Office: [phone]

## 2021-10-05 NOTE — PHYSICAL THERAPY INITIAL EVALUATION ADULT - ADDITIONAL COMMENTS
as per michael at bedside normally pt is independent with no AD and lives alone in a PH with 4 steps to enter + first floor set up.

## 2021-10-05 NOTE — PROGRESS NOTE ADULT - ASSESSMENT
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  She is being evaluated for ovarian cancer for c/o lower abdominal pain as outpatient.   She developed fever on 10/03 with accelerated weakness and came to ER for evaluation.   Fever to 101.4 in the ER prompted CTX.  Etiology of fever not clear although "B" symptoms of lymphoma possible but her decline in functional status seemed acute.   Reported abdominal pain and bloating but CT scan without acute pathology.   UA with 14 WBCs  On empiric Ceftriaxone - afebrile today     Suggest:  Follow CT of chest results pending   Follow blood & urine culture  WNV serology, Quant TB serology  CTX okay pending cultures

## 2021-10-05 NOTE — DIETITIAN NUTRITION RISK NOTIFICATION - TREATMENT: THE FOLLOWING DIET HAS BEEN RECOMMENDED
Diet, Soft:   Low Sodium  Supplement Feeding Modality:  Oral  Ensure Enlive Cans or Servings Per Day:  2       Frequency:  Daily (10-05-21 @ 13:31) [Pending Verification By Attending]  Diet, Regular:   DASH/TLC {Sodium & Cholesterol Restricted} (DASH) (10-04-21 @ 15:57) [Active]

## 2021-10-05 NOTE — DIETITIAN INITIAL EVALUATION ADULT. - ORAL INTAKE PTA/DIET HISTORY
Pt is Portuguese speaking, information obtained by granddaughter at bedside, pt with limited participation in interview. Granddaughter reports pt with decreasing PO intake and appetite over the last year. Reports pt lives alone so she is unable to provide detailed diet recall. Does report pt usually has dinner and drinks Ensure supplement at home (likes chocolate flavor). NKFA. Denies nausea, vomiting, diarrhea, constipation, pt noted with some abdominal pain.

## 2021-10-05 NOTE — DIETITIAN INITIAL EVALUATION ADULT. - ADD RECOMMEND
1) Recommend change diet to Soft Low sodium diet. 2) Recommend add Ensure Enlive BID to supplement PO intake. 3) Recommend addition of multivitamin and ascorbic acid for wound healing. 4) RD to remain available and follow-up as medically appropriate. 5) Malnutrition alert placed in EMR

## 2021-10-05 NOTE — DIETITIAN INITIAL EVALUATION ADULT. - PERTINENT LABORATORY DATA
10-05 @ 07:40: Na 131<L>, BUN 32<H>, Cr 1.52<H>, BG 93, K+ 4.3, Phos --, Mg --, Alk Phos 115, ALT/SGPT 29, AST/SGOT 118<H>

## 2021-10-06 ENCOUNTER — NON-APPOINTMENT (OUTPATIENT)
Age: 81
End: 2021-10-06

## 2021-10-06 LAB
-  AMIKACIN: SIGNIFICANT CHANGE UP
-  AMOXICILLIN/CLAVULANIC ACID: SIGNIFICANT CHANGE UP
-  AMPICILLIN/SULBACTAM: SIGNIFICANT CHANGE UP
-  AMPICILLIN: SIGNIFICANT CHANGE UP
-  AZTREONAM: SIGNIFICANT CHANGE UP
-  CEFAZOLIN: SIGNIFICANT CHANGE UP
-  CEFEPIME: SIGNIFICANT CHANGE UP
-  CEFOXITIN: SIGNIFICANT CHANGE UP
-  CEFTRIAXONE: SIGNIFICANT CHANGE UP
-  CIPROFLOXACIN: SIGNIFICANT CHANGE UP
-  ERTAPENEM: SIGNIFICANT CHANGE UP
-  GENTAMICIN: SIGNIFICANT CHANGE UP
-  IMIPENEM: SIGNIFICANT CHANGE UP
-  LEVOFLOXACIN: SIGNIFICANT CHANGE UP
-  MEROPENEM: SIGNIFICANT CHANGE UP
-  NITROFURANTOIN: SIGNIFICANT CHANGE UP
-  PIPERACILLIN/TAZOBACTAM: SIGNIFICANT CHANGE UP
-  TIGECYCLINE: SIGNIFICANT CHANGE UP
-  TOBRAMYCIN: SIGNIFICANT CHANGE UP
-  TRIMETHOPRIM/SULFAMETHOXAZOLE: SIGNIFICANT CHANGE UP
ALBUMIN SERPL ELPH-MCNC: 2.6 G/DL — LOW (ref 3.3–5)
ALP SERPL-CCNC: 104 U/L — SIGNIFICANT CHANGE UP (ref 40–120)
ALT FLD-CCNC: 25 U/L — SIGNIFICANT CHANGE UP (ref 10–45)
ANION GAP SERPL CALC-SCNC: 11 MMOL/L — SIGNIFICANT CHANGE UP (ref 5–17)
AST SERPL-CCNC: 97 U/L — HIGH (ref 10–40)
BILIRUB SERPL-MCNC: 0.4 MG/DL — SIGNIFICANT CHANGE UP (ref 0.2–1.2)
BUN SERPL-MCNC: 25 MG/DL — HIGH (ref 7–23)
CALCIUM SERPL-MCNC: 9.2 MG/DL — SIGNIFICANT CHANGE UP (ref 8.4–10.5)
CHLORIDE SERPL-SCNC: 102 MMOL/L — SIGNIFICANT CHANGE UP (ref 96–108)
CO2 SERPL-SCNC: 21 MMOL/L — LOW (ref 22–31)
COVID-19 SPIKE DOMAIN AB INTERP: NEGATIVE — SIGNIFICANT CHANGE UP
COVID-19 SPIKE DOMAIN ANTIBODY RESULT: 0.4 U/ML — SIGNIFICANT CHANGE UP
CREAT SERPL-MCNC: 1.28 MG/DL — SIGNIFICANT CHANGE UP (ref 0.5–1.3)
CULTURE RESULTS: SIGNIFICANT CHANGE UP
CULTURE RESULTS: SIGNIFICANT CHANGE UP
GLUCOSE SERPL-MCNC: 84 MG/DL — SIGNIFICANT CHANGE UP (ref 70–99)
HCT VFR BLD CALC: 30.3 % — LOW (ref 34.5–45)
HGB BLD-MCNC: 10.1 G/DL — LOW (ref 11.5–15.5)
MCHC RBC-ENTMCNC: 33.3 GM/DL — SIGNIFICANT CHANGE UP (ref 32–36)
MCHC RBC-ENTMCNC: 33.3 PG — SIGNIFICANT CHANGE UP (ref 27–34)
MCV RBC AUTO: 100 FL — SIGNIFICANT CHANGE UP (ref 80–100)
METHOD TYPE: SIGNIFICANT CHANGE UP
NRBC # BLD: 0 /100 WBCS — SIGNIFICANT CHANGE UP (ref 0–0)
ORGANISM # SPEC MICROSCOPIC CNT: SIGNIFICANT CHANGE UP
ORGANISM # SPEC MICROSCOPIC CNT: SIGNIFICANT CHANGE UP
PLATELET # BLD AUTO: 189 K/UL — SIGNIFICANT CHANGE UP (ref 150–400)
POTASSIUM SERPL-MCNC: 3.9 MMOL/L — SIGNIFICANT CHANGE UP (ref 3.5–5.3)
POTASSIUM SERPL-SCNC: 3.9 MMOL/L — SIGNIFICANT CHANGE UP (ref 3.5–5.3)
PROT SERPL-MCNC: 5.6 G/DL — LOW (ref 6–8.3)
RAPID RVP RESULT: SIGNIFICANT CHANGE UP
RBC # BLD: 3.03 M/UL — LOW (ref 3.8–5.2)
RBC # FLD: 18 % — HIGH (ref 10.3–14.5)
SARS-COV-2 IGG+IGM SERPL QL IA: 0.4 U/ML — SIGNIFICANT CHANGE UP
SARS-COV-2 IGG+IGM SERPL QL IA: NEGATIVE — SIGNIFICANT CHANGE UP
SARS-COV-2 RNA SPEC QL NAA+PROBE: SIGNIFICANT CHANGE UP
SODIUM SERPL-SCNC: 134 MMOL/L — LOW (ref 135–145)
SPECIMEN SOURCE: SIGNIFICANT CHANGE UP
SPECIMEN SOURCE: SIGNIFICANT CHANGE UP
WBC # BLD: 2.46 K/UL — LOW (ref 3.8–10.5)
WBC # FLD AUTO: 2.46 K/UL — LOW (ref 3.8–10.5)

## 2021-10-06 PROCEDURE — 71045 X-RAY EXAM CHEST 1 VIEW: CPT | Mod: 26

## 2021-10-06 PROCEDURE — 99232 SBSQ HOSP IP/OBS MODERATE 35: CPT

## 2021-10-06 RX ORDER — FUROSEMIDE 40 MG
20 TABLET ORAL ONCE
Refills: 0 | Status: COMPLETED | OUTPATIENT
Start: 2021-10-06 | End: 2021-10-06

## 2021-10-06 RX ORDER — IPRATROPIUM/ALBUTEROL SULFATE 18-103MCG
3 AEROSOL WITH ADAPTER (GRAM) INHALATION ONCE
Refills: 0 | Status: COMPLETED | OUTPATIENT
Start: 2021-10-06 | End: 2021-10-06

## 2021-10-06 RX ORDER — IPRATROPIUM/ALBUTEROL SULFATE 18-103MCG
3 AEROSOL WITH ADAPTER (GRAM) INHALATION EVERY 6 HOURS
Refills: 0 | Status: DISCONTINUED | OUTPATIENT
Start: 2021-10-06 | End: 2021-10-07

## 2021-10-06 RX ORDER — CEFTRIAXONE 500 MG/1
1000 INJECTION, POWDER, FOR SOLUTION INTRAMUSCULAR; INTRAVENOUS EVERY 24 HOURS
Refills: 0 | Status: DISCONTINUED | OUTPATIENT
Start: 2021-10-07 | End: 2021-10-08

## 2021-10-06 RX ORDER — IPRATROPIUM/ALBUTEROL SULFATE 18-103MCG
3 AEROSOL WITH ADAPTER (GRAM) INHALATION EVERY 6 HOURS
Refills: 0 | Status: DISCONTINUED | OUTPATIENT
Start: 2021-10-08 | End: 2021-10-19

## 2021-10-06 RX ORDER — CEFTRIAXONE 500 MG/1
1000 INJECTION, POWDER, FOR SOLUTION INTRAMUSCULAR; INTRAVENOUS EVERY 24 HOURS
Refills: 0 | Status: DISCONTINUED | OUTPATIENT
Start: 2021-10-06 | End: 2021-10-06

## 2021-10-06 RX ADMIN — Medication 10 MILLIGRAM(S): at 21:22

## 2021-10-06 RX ADMIN — Medication 3 MILLILITER(S): at 21:21

## 2021-10-06 RX ADMIN — HEPARIN SODIUM 5000 UNIT(S): 5000 INJECTION INTRAVENOUS; SUBCUTANEOUS at 21:22

## 2021-10-06 RX ADMIN — POLYETHYLENE GLYCOL 3350 17 GRAM(S): 17 POWDER, FOR SOLUTION ORAL at 17:48

## 2021-10-06 RX ADMIN — Medication 1 TABLET(S): at 12:00

## 2021-10-06 RX ADMIN — HEPARIN SODIUM 5000 UNIT(S): 5000 INJECTION INTRAVENOUS; SUBCUTANEOUS at 05:18

## 2021-10-06 RX ADMIN — Medication 2 MILLIGRAM(S): at 05:18

## 2021-10-06 RX ADMIN — LIDOCAINE 1 PATCH: 4 CREAM TOPICAL at 05:31

## 2021-10-06 RX ADMIN — Medication 500 MILLIGRAM(S): at 11:59

## 2021-10-06 RX ADMIN — Medication 20 MILLIGRAM(S): at 23:37

## 2021-10-06 RX ADMIN — LIDOCAINE 1 PATCH: 4 CREAM TOPICAL at 16:30

## 2021-10-06 RX ADMIN — HEPARIN SODIUM 5000 UNIT(S): 5000 INJECTION INTRAVENOUS; SUBCUTANEOUS at 13:57

## 2021-10-06 RX ADMIN — CEFTRIAXONE 100 MILLIGRAM(S): 500 INJECTION, POWDER, FOR SOLUTION INTRAMUSCULAR; INTRAVENOUS at 12:07

## 2021-10-06 RX ADMIN — PERPHENAZINE 2 MILLIGRAM(S): 8 TABLET, FILM COATED ORAL at 21:22

## 2021-10-06 RX ADMIN — LIDOCAINE 1 PATCH: 4 CREAM TOPICAL at 19:35

## 2021-10-06 RX ADMIN — Medication 3 MILLILITER(S): at 23:38

## 2021-10-06 RX ADMIN — Medication 2 MILLIGRAM(S): at 17:53

## 2021-10-06 RX ADMIN — SENNA PLUS 2 TABLET(S): 8.6 TABLET ORAL at 21:22

## 2021-10-06 RX ADMIN — SODIUM CHLORIDE 50 MILLILITER(S): 9 INJECTION INTRAMUSCULAR; INTRAVENOUS; SUBCUTANEOUS at 21:21

## 2021-10-06 RX ADMIN — POLYETHYLENE GLYCOL 3350 17 GRAM(S): 17 POWDER, FOR SOLUTION ORAL at 05:24

## 2021-10-06 NOTE — PROGRESS NOTE ADULT - ASSESSMENT
Patient is a 80 PMHx of new onset CLL, with possible staging of ovarian cancer, HTN, HLD, CC s/p fall and fevers.  Per daughter, pt lives at home alone, fell two times while getting out of bed. States she fell onto both hands and knees while rolling out of bed. No LOC.  Denied any lightheadedness, dizziness at the time of fall.  Was brought in today due to having a fever. PT complaining of SOB as well, and has pain in both knees. Denies cough, headache, chest pain, nausea, vomiting. Has not started any chemotherapy per daughter. Has renal failure, denied h/o renal failure      A/P:  HARJIT  Baseline Scr unclear  HRAJIT possibly sec to contrast s/p 10/4   Renal function improving  Monitor renal function at present  Avoid nephrotoxics, NSAIds RCA    Hyponatremia  Urine lytes suggestive of dehydration   Continue NS  Monitor Na level daily                     Change antibiotics to NS instead of D5 if possible    HTN:  not on antihypertensive meds  monitor at present       Proteinuria/Hematuria  in setting of UTI  Repeat UA post tx   Ct A/P with no renal mass

## 2021-10-06 NOTE — PROGRESS NOTE ADULT - ASSESSMENT
79yo F with  new onset CLL, with possible staging of ovarian cancer, HTN, HLD, RA, anxiety, s/p fall and fevers.   AMS likely 2/2 infection.  dizziness better + UTI  CTH and CT c spien unremarkable   CT C A P  LDL 44, A1c 5.2  - ceftriaxone for infection /UTI  - heme/onc for CLL  - HARJIT, IVF  - MRI brain w/ and w/o if in agreement with GOC  - check orthostatics   - check b12   - PT/OT/SS/SLP, OOBC  - check FS, glucose control <180  - GI/DVT ppx  - Counseling on diet, exercise, and medication adherence was done  - Counseling on smoking cessation and alcohol consumption offered when appropriate.  - Pain assessed and judicious use of narcotics when appropriate was discussed.    - Stroke education given when appropriate.  - Importance of fall prevention discussed.   - Differential diagnosis and plan of care discussed with patient and/or family and primary team  - Thank you for allowing me to participate in the care of this patient. Call with questions.   Sherman Thompson MD  Vascular Neurology  Office: [phone]

## 2021-10-06 NOTE — ADVANCED PRACTICE NURSE CONSULT - RECOMMEDATIONS
Will recommend the followin. B/l buttocks: Cavilon to periwound skin,  Apply Medihoney paste to wounds- cover with foam, change every 3 days and prn for drainage, consider a Dermatology consult for further evaluation.  2. turning and positioning  3. Complete Cair boots  4. Seat cushion when OOB to chair  5. continue with Prima-fit  6. Nutrition support as pt condition allows  Tx plan discussed with RN/MD

## 2021-10-06 NOTE — PROGRESS NOTE ADULT - ASSESSMENT
Patient is a 80 PMHx of new onset CLL, with possible staging of ovarian cancer, HTN, HLD, CC s/p fall and fevers.  Per daughter, pt lives at home alone, fell two times while getting out of bed. States she fell onto both hands and knees while rolling out of bed. No LOC.  Denied any lightheadedness, dizziness at the time of fall.  Currently states she has some dizziness, like she would fall over if she had to walk. Was brought in today due to having a fever. PT complaining of SOB as well, and has pain in both knees. Denies cough, headache, chest pain, nausea, vomiting. Has not started any chemotherapy per daughter    # AMS and dizziness   CT head noted  fall precautions  check orthostatics  Neurology eval appreciated follow up recs    # Sepsis  R/O sepsis   UTI vs tumor fever vs other etiologies   Awaiting Pan cx results  cont Rocephin for now  monitor clinically   ID eval appreciated   Hydration as tolerated  Pan CT noted      # HARJIT   HARJIT resolved  Continue with IV hydration  Monitor renal function  I and Os     # Ovarian Ca  with mets  seen on CT   onc follow up   Onc eval called, patient follows with TRUNG, will call     # HTN   resume BP meds   adjust as tolerated     # HLD   lipid panel       DVT and GI PPX

## 2021-10-06 NOTE — PROGRESS NOTE ADULT - SUBJECTIVE AND OBJECTIVE BOX
Name of Patient : PREM KAY  MRN: [id number]  Date of visit: 10-06-21 @ 12:01      Subjective: Patient seen and examined. No new events except as noted. Patient is afebrile X 24 hours.    REVIEW OF SYSTEMS:    CONSTITUTIONAL: + Weakness  EYES/ENT: No visual changes;  No vertigo or throat pain   NECK: No pain or stiffness  RESPIRATORY: No cough, wheezing, hemoptysis; No shortness of breath  CARDIOVASCULAR: No chest pain or palpitations  GASTROINTESTINAL: +abdominal pain.   GENITOURINARY: No dysuria, frequency or hematuria  NEUROLOGICAL: No numbness or weakness  SKIN: No itching, burning, rashes, or lesions   All other review of systems is negative unless indicated above.    MEDICATIONS:  MEDICATIONS  (STANDING):  amitriptyline 10 milliGRAM(s) Oral at bedtime  ascorbic acid 500 milliGRAM(s) Oral daily  cefTRIAXone   IVPB 1000 milliGRAM(s) IV Intermittent every 24 hours  heparin   Injectable 5000 Unit(s) SubCutaneous every 8 hours  influenza   Vaccine 0.5 milliLiter(s) IntraMuscular once  lidocaine   4% Patch 1 Patch Transdermal every 24 hours  LORazepam     Tablet 2 milliGRAM(s) Oral two times a day  multivitamin 1 Tablet(s) Oral daily  perphenazine 2 milliGRAM(s) Oral at bedtime  polyethylene glycol 3350 17 Gram(s) Oral two times a day  senna 2 Tablet(s) Oral at bedtime  sodium chloride 0.9%. 1000 milliLiter(s) (50 mL/Hr) IV Continuous <Continuous>      PHYSICAL EXAM:  T(C): 36.6 (10-06-21 @ 05:16), Max: 37.4 (10-05-21 @ 13:24)  HR: 77 (10-06-21 @ 05:16) (71 - 87)  BP: 157/75 (10-06-21 @ 05:16) (115/75 - 157/75)  RR: 18 (10-06-21 @ 05:16) (18 - 20)  SpO2: 94% (10-06-21 @ 05:16) (94% - 97%)  Wt(kg): --  I&O's Summary    05 Oct 2021 07:01  -  06 Oct 2021 07:00  --------------------------------------------------------  IN: 2120 mL / OUT: 1150 mL / NET: 970 mL    06 Oct 2021 07:01  -  06 Oct 2021 12:01  --------------------------------------------------------  IN: 160 mL / OUT: 150 mL / NET: 10 mL          Appearance: Normal	  HEENT:  PERRLA   Lymphatic: No lymphadenopathy   Cardiovascular: Normal S1 S2, no JVD  Respiratory: normal effort , clear  Gastrointestinal:  Soft, Non-tender  Skin: No rashes,  warm to touch  Psychiatry:  Mood & affect appropriate  Musculuskeletal: No edema      All labs, Imaging and EKGs personally reviewed                             10.1   2.46  )-----------( 189      ( 06 Oct 2021 07:07 )             30.3               10-06    134<L>  |  102  |  25<H>  ----------------------------<  84  3.9   |  21<L>  |  1.28    Ca    9.2      06 Oct 2021 07:07    TPro  5.6<L>  /  Alb  2.6<L>  /  TBili  0.4  /  DBili  x   /  AST  97<H>  /  ALT  25  /  AlkPhos  104  10-06              Urinalysis Basic - ( 05 Oct 2021 17:05 )    Color: Yellow / Appearance: Clear / S.033 / pH: x  Gluc: x / Ketone: Negative  / Bili: Negative / Urobili: Negative   Blood: x / Protein: 100 / Nitrite: Negative   Leuk Esterase: Negative / RBC: 2 /hpf / WBC 3 /HPF   Sq Epi: x / Non Sq Epi: 2 /hpf / Bacteria: Negative                 Name of Patient : PREM KAY  MRN: [id number]  Date of visit: 10-06-21 @ 12:01      Subjective: Patient seen and examined. No new events except as noted.   Patient is afebrile X 24 hours.    REVIEW OF SYSTEMS:    CONSTITUTIONAL: + Weakness  EYES/ENT: No visual changes;  No vertigo or throat pain   NECK: No pain or stiffness  RESPIRATORY: No cough, wheezing, hemoptysis; No shortness of breath  CARDIOVASCULAR: No chest pain or palpitations  GASTROINTESTINAL: +abdominal pain.   GENITOURINARY: No dysuria, frequency or hematuria  NEUROLOGICAL: No numbness or weakness  SKIN: No itching, burning, rashes, or lesions   All other review of systems is negative unless indicated above.    MEDICATIONS:  MEDICATIONS  (STANDING):  amitriptyline 10 milliGRAM(s) Oral at bedtime  ascorbic acid 500 milliGRAM(s) Oral daily  cefTRIAXone   IVPB 1000 milliGRAM(s) IV Intermittent every 24 hours  heparin   Injectable 5000 Unit(s) SubCutaneous every 8 hours  influenza   Vaccine 0.5 milliLiter(s) IntraMuscular once  lidocaine   4% Patch 1 Patch Transdermal every 24 hours  LORazepam     Tablet 2 milliGRAM(s) Oral two times a day  multivitamin 1 Tablet(s) Oral daily  perphenazine 2 milliGRAM(s) Oral at bedtime  polyethylene glycol 3350 17 Gram(s) Oral two times a day  senna 2 Tablet(s) Oral at bedtime  sodium chloride 0.9%. 1000 milliLiter(s) (50 mL/Hr) IV Continuous <Continuous>      PHYSICAL EXAM:  T(C): 36.6 (10-06-21 @ 05:16), Max: 37.4 (10-05-21 @ 13:24)  HR: 77 (10-06-21 @ 05:16) (71 - 87)  BP: 157/75 (10-06-21 @ 05:16) (115/75 - 157/75)  RR: 18 (10-06-21 @ 05:16) (18 - 20)  SpO2: 94% (10-06-21 @ 05:16) (94% - 97%)  Wt(kg): --  I&O's Summary    05 Oct 2021 07:01  -  06 Oct 2021 07:00  --------------------------------------------------------  IN: 2120 mL / OUT: 1150 mL / NET: 970 mL    06 Oct 2021 07:01  -  06 Oct 2021 12:01  --------------------------------------------------------  IN: 160 mL / OUT: 150 mL / NET: 10 mL          Appearance: Normal	  HEENT:  PERRLA   Lymphatic: No lymphadenopathy   Cardiovascular: Normal S1 S2, no JVD  Respiratory: normal effort , clear  Gastrointestinal:  Soft, pain on palaption   Skin: No rashes,  warm to touch  Psychiatry:  Mood & affect appropriate  Musculuskeletal: No edema      All labs, Imaging and EKGs personally reviewed                             10.1   2.46  )-----------( 189      ( 06 Oct 2021 07:07 )             30.3               10-06    134<L>  |  102  |  25<H>  ----------------------------<  84  3.9   |  21<L>  |  1.28    Ca    9.2      06 Oct 2021 07:07    TPro  5.6<L>  /  Alb  2.6<L>  /  TBili  0.4  /  DBili  x   /  AST  97<H>  /  ALT  25  /  AlkPhos  104  10-06              Urinalysis Basic - ( 05 Oct 2021 17:05 )    Color: Yellow / Appearance: Clear / S.033 / pH: x  Gluc: x / Ketone: Negative  / Bili: Negative / Urobili: Negative   Blood: x / Protein: 100 / Nitrite: Negative   Leuk Esterase: Negative / RBC: 2 /hpf / WBC 3 /HPF   Sq Epi: x / Non Sq Epi: 2 /hpf / Bacteria: Negative

## 2021-10-06 NOTE — PROGRESS NOTE ADULT - SUBJECTIVE AND OBJECTIVE BOX
Neurology Progress Note    S: Patient seen and examined. msk cp overnight. resting in bed     Medication:  acetaminophen   Tablet .. 650 milliGRAM(s) Oral every 6 hours PRN  acetaminophen   Tablet .. 650 milliGRAM(s) Oral every 6 hours PRN  amitriptyline 10 milliGRAM(s) Oral at bedtime  ascorbic acid 500 milliGRAM(s) Oral daily  cefTRIAXone   IVPB 1000 milliGRAM(s) IV Intermittent every 24 hours  heparin   Injectable 5000 Unit(s) SubCutaneous every 8 hours  influenza   Vaccine 0.5 milliLiter(s) IntraMuscular once  lidocaine   4% Patch 1 Patch Transdermal every 24 hours  LORazepam     Tablet 2 milliGRAM(s) Oral two times a day  multivitamin 1 Tablet(s) Oral daily  perphenazine 2 milliGRAM(s) Oral at bedtime  polyethylene glycol 3350 17 Gram(s) Oral two times a day  senna 2 Tablet(s) Oral at bedtime  sodium chloride 0.9%. 1000 milliLiter(s) IV Continuous <Continuous>      Vitals:  Vital Signs Last 24 Hrs  T(C): 36.6 (06 Oct 2021 05:16), Max: 37.4 (05 Oct 2021 13:24)  T(F): 97.8 (06 Oct 2021 05:16), Max: 99.3 (05 Oct 2021 13:24)  HR: 77 (06 Oct 2021 05:16) (71 - 87)  BP: 157/75 (06 Oct 2021 05:16) (115/75 - 157/75)  BP(mean): --  RR: 18 (06 Oct 2021 05:16) (18 - 20)  SpO2: 94% (06 Oct 2021 05:16) (94% - 97%)      General Exam:   General Appearance: Appropriately dressed and in no acute distress       Head: Normocephalic, atraumatic and no dysmorphic features  Ear, Nose, and Throat: Moist mucous membranes  CVS: S1S2+  Resp: No SOB, no wheeze or rhonchi  GI: soft NT/ND  Extremities: No edema or cyanosis  Skin: No bruises or rashes     Neurological Exam:  Mental Status: Awake, alert and oriented x 2.  Able to follow simple and complex verbal commands. Able to name and repeat. fluent speech. No obvious aphasia or dysarthria noted. masked facies. + frontal release sigbns   Cranial Nerves: PERRL, EOMI, VFFC, sensation V1-V3 intact,  L facial asymmetry, equal elevation of palate, scm/trap 5/5, tongue is midline on protrusion. no obvious papilledema on fundoscopic exam. hearing is grossly intact.   Motor:HASTINGS but + tremor L>R moves Uppers>lowers   Sensation: Intact to light touch and pinprick throughout. no right/left confusion. no extinction to tactile on DSS.   Reflexes: 1+ throughout at biceps, brachioradialis, triceps, patellars and ankles bilaterally and equal. No clonus. R toe and L toe were both downgoing.  Coordination: no dysmetria FNF  Gait: deferred   I personally reviewed the below data/images/labs:      CBC Full  -  ( 06 Oct 2021 07:07 )  WBC Count : 2.46 K/uL  RBC Count : 3.03 M/uL  Hemoglobin : 10.1 g/dL  Hematocrit : 30.3 %  Platelet Count - Automated : 189 K/uL  Mean Cell Volume : 100.0 fl  Mean Cell Hemoglobin : 33.3 pg  Mean Cell Hemoglobin Concentration : 33.3 gm/dL  Auto Neutrophil # : x  Auto Lymphocyte # : x  Auto Monocyte # : x  Auto Eosinophil # : x  Auto Basophil # : x  Auto Neutrophil % : x  Auto Lymphocyte % : x  Auto Monocyte % : x  Auto Eosinophil % : x  Auto Basophil % : x    10-06    134<L>  |  102  |  25<H>  ----------------------------<  84  3.9   |  21<L>  |  1.28    Ca    9.2      06 Oct 2021 07:07    TPro  5.6<L>  /  Alb  2.6<L>  /  TBili  0.4  /  DBili  x   /  AST  97<H>  /  ALT  25  /  AlkPhos  104  10-06    LIVER FUNCTIONS - ( 06 Oct 2021 07:07 )  Alb: 2.6 g/dL / Pro: 5.6 g/dL / ALK PHOS: 104 U/L / ALT: 25 U/L / AST: 97 U/L / GGT: x             Urinalysis Basic - ( 05 Oct 2021 17:05 )    Color: Yellow / Appearance: Clear / S.033 / pH: x  Gluc: x / Ketone: Negative  / Bili: Negative / Urobili: Negative   Blood: x / Protein: 100 / Nitrite: Negative   Leuk Esterase: Negative / RBC: 2 /hpf / WBC 3 /HPF   Sq Epi: x / Non Sq Epi: 2 /hpf / Bacteria: Negative      < from: CT Head No Cont (10.04.21 @ 09:07) >    EXAM:  CT CERVICAL SPINE                          EXAM:  CT BRAIN                            PROCEDURE DATE:  10/04/2021            INTERPRETATION:  CLINICAL STATEMENT: Trauma..    TECHNIQUE: CT of the head and cervical spine were performed withoutIV contrast. 3-D/MIP images obtained    COMPARISON: None.    FINDINGS:  Head:  There is mild diffuse parenchymal volume loss. There are areas of low attenuation in the periventricular white matter likely related to mild chronic microvascular ischemicchanges.    There is no acute intracranial hemorrhage, parenchymal mass, mass effect or midline shift. There is no acute territorial infarct. There is no hydrocephalus. Partial empty sella    The cranium is intact.    Mild mucosal thickening right maxillary sinus    Cervical spine:  Vertebral body heights intact. Straightening of the cervical lordosis. Grade 1 retrolisthesis C3 on C4. Grade 1 anterolisthesis of C5 on C6    There is no prevertebral soft tissue swelling. The odontoid is intact.    Evaluation of the spinal canal is limited on a CT exam.  Multilevel degenerative changes noted resulting in multilevel spinal canal stenosis and neural foraminal narrowing.    Partially visualized cervical lymphadenopathy noted with multiple enlarged lymph nodes. Calcifications noted within the thyroid gland.    IMPRESSION:  No acute intracranial hemorrhage.    No acute fracture cervical spine. If pain persists, follow-up MRI exam recommended    Partially visualized cervical lymphadenopathy suspicious for lymphoma or metastatic disease. Correlate clinically    --- End of Report ---                ROBI RUTH MD; Attending Radiologist  This document has been electronically signed. Oct  4 2021  9:22AM    < end of copied text >      < from: CT Chest No Cont (10.04.21 @ 20:17) >    EXAM:  CT CHEST                            PROCEDURE DATE:  10/04/2021            INTERPRETATION:  CLINICAL INFORMATION: Shortness of breath, evaluate for pneumonia    COMPARISON: CT abdomen and pelvis 10/4/2021    CONTRAST/COMPLICATIONS:  IV Contrast: None  Oral Contrast: None  Complications: No immediate complication    PROCEDURE:  CT of the Chest was performed.  Sagittal and coronal reformats were performed.    FINDINGS:    LUNGS AND AIRWAYS: Patent central airways.  Calcified nodule abutting the left anterior pleura (series 4, image 57). 3 mm nodule in the left upper lobe (series 4, image 39). Additional 2 mm nodule in the left upper lobe (series 4, image 58).  PLEURA: No pleural effusion.  MEDIASTINUM AND ERICA: Multiple enlarged supraclavicular lymph nodes. Additional axillary and mediastinal lymphadenopathy is seen.  VESSELS: Within normal limits.  HEART: Heart size is normal. No pericardial effusion.  CHEST WALL AND LOWER NECK: Calcifications within the bilateral thyroid lobes.  VISUALIZED UPPER ABDOMEN: Please refer to prior CT abdomen/pelvis.  BONES: Degenerative changes of the spine.    IMPRESSION:  Lymphadenoapthy described above is consistent with CLL as per patient history.    No CT evidence for pneumonia.                --- End of Report ---              KADEEM YE MD; Resident Radiology  This document has been electronically signed.  REYMUNDO VARGAS MD; Attending Radiologist  This document has been electronically signed. Oct  5 2021 12:09PM    < end of copied text >  < from: CT Abdomen and Pelvis w/ IV Cont (10.04.21 @ 09:30) >    EXAM:  CT ABDOMEN AND PELVIS IC                            PROCEDURE DATE:  10/04/2021            INTERPRETATION:  CLINICAL INFORMATION: Left lower quadrant pain    COMPARISON: None.    CONTRAST/COMPLICATIONS:  IV Contrast: Omnipaque 350  90 cc administered   10 cc discarded  Oral Contrast: None  Complications: None    PROCEDURE:  CT of the Abdomen and Pelvis was performed.  Sagittal and coronal reformats were performed.    FINDINGS:  LOWER CHEST: Bibasilar atelectasis. Partially imaged enlarged right axillary lymph nodes.    LIVER: Within normal limits.  BILE DUCTS: Normal caliber.  GALLBLADDER: Within normal limits.  SPLEEN: Within normal limits.  PANCREAS: Fatty atrophy of the pancreas without cutoff. No ductal dilatation or cutoff.  ADRENALS: Within normal limits.  KIDNEYS/URETERS: Within normal limits.    BLADDER/REPRODUCTIVE ORGANS: Multiloculated right adnexal cystic mass measuring 14.6 x 9.0 cm, which abuts the appendix. Left adnexal cyst measures 2.1 x 1.4 cm. Uterus is unremarkable.    BOWEL: No bowel obstruction. Appendix is normal. Colonic diverticulosis.  PERITONEUM: 2 small nodules are present adjacent to the cystic lesion measuring 4 and 6 mm in diameter, respectively (2:75 and 79), possibly peritoneal nodules or small pericolonic lymph nodes.  VESSELS: Within normal limits.  RETROPERITONEUM/LYMPH NODES: Enlarged bilateral inguinal, iliac, mesenteric, and retroperitoneal lymph nodes.  *  Reference left inguinal lymph node measures up to 4.0 x 2.5 cm (2:93).  *  Reference left periaortic lymph node measures up to 2.5 x 2.3 cm (2:45).  ABDOMINAL WALL: Within normal limits.  BONES: Degenerative changes. Lumbar dextroscoliosis.    IMPRESSION:  Enlarged bilateral inguinal, iliac, mesenteric, and retroperitoneal lymph nodes. Partially imaged enlarged right axillary lymph nodes. Primary consideration is lymphoma. Metastatic disease is not excluded. Dedicated chest imaging is recommended for full evaluation.    Cystic right adnexal mass measuring 14.6 x 9.0 cm, concerning for epithelial neoplasm.      --- End of Report ---      WALTER ALANIS MD; Resident Interventional Radiology  This document has been electronically signed.  DAREN MOHAN MD; Attending Radiologist  This document has been electronically signed. Oct  4 2021 11:14AM    < end of copied text >

## 2021-10-06 NOTE — ADVANCED PRACTICE NURSE CONSULT - REASON FOR CONSULT
Requested by staff to assess skin status: b/l buttocks. PMH is noted:  Patient is a 80 PMHx of new onset CLL, with possible staging of ovarian cancer, HTN, HLD, CC s/p fall and fevers.  Per daughter, pt lives at home alone, fell two times while getting out of bed. States she fell onto both hands and knees while rolling out of bed. No LOC.  Denied any lightheadedness, dizziness at the time of fall.  Currently states she has some dizziness, like she would fall over if she had to walk. Was brought in today due to having a fever. PT complaining of SOB as well, and has pain in both knees. Denies cough, headache, chest pain, nausea, vomiting. Has not started any chemotherapy per daughter

## 2021-10-06 NOTE — ADVANCED PRACTICE NURSE CONSULT - ASSESSMENT
The pt was encountered on [facility]- Mrs Quezada is in a VersaCare P 500 support surface and is being assisted with turning and positioning will recommend Complete Cair boots to off-load the heels.  staff have applied a Prima-fit catheter to divert urine from the skin.  Upon assessment, the pt presents with several open wounds located on the b/l buttocks, there are 5 open areas in total, forming an arc on the b/l buttocks.  They measure as follows: lateral aspect of right buttocks: 0.5cmx 1.5cm x0cm , 0.5cmx 2.5cmx 0cm, and 3cm x 1cmx 0cm. These wounds appear to be superficial, the wound bed is pink with no drainage, the periwound skin is hyperpigmented. There is a central wound which measures 1cmx 1.5cmx 0.5cm - this wound was painful to palpation with a scant amount of loosely adhered slough. on the lateral aspect of the left buttocks was a wound measuring 1.5cmx 2cmx 0cm- the wound was superficial with red, moist tissue and fibrinous exudate. Question the etiology of these wounds- given the chronicity- pt states she has them "a while." and atypical presentation will recommend further evaluation by Dermatology. Discussed same with the MD.  the pt was seen by nutrition and a consult is noted.

## 2021-10-06 NOTE — PROGRESS NOTE ADULT - SUBJECTIVE AND OBJECTIVE BOX
CC: f/u for  fever  Patient reports nothing intelligible    REVIEW OF SYSTEMS:  All other review of systems negative (Comprehensive ROS)    Antimicrobials Day #  :3  cefTRIAXone   IVPB 1000 milliGRAM(s) IV Intermittent every 24 hours    Other Medications Reviewed    T(F): 97.8 (10-06-21 @ 05:16), Max: 102.3 (10-05-21 @ 10:19)  HR: 77 (10-06-21 @ 05:16)  BP: 157/75 (10-06-21 @ 05:16)  RR: 18 (10-06-21 @ 05:16)  SpO2: 94% (10-06-21 @ 05:16)  Wt(kg): --    PHYSICAL EXAM:  General: alert, no acute distress  Eyes:  anicteric, no conjunctival injection, no discharge  Oropharynx: no lesions or injection 	  Neck: supple, without adenopathy  Lungs: wheezes  to auscultation  Heart: regular rate and rhythm; no murmur, rubs or gallops  Abdomen: soft, nondistended, nontender, without mass or organomegaly  Skin: no lesions  Extremities: no clubbing, cyanosis, . legs with  edema  Neurologic: alert, , moves all extremities    LAB RESULTS:                        10.1   2.46  )-----------( 189      ( 06 Oct 2021 07:07 )             30.3     10    134<L>  |  102  |  25<H>  ----------------------------<  84  3.9   |  21<L>  |  1.28    Ca    9.2      06 Oct 2021 07:07    TPro  5.6<L>  /  Alb  2.6<L>  /  TBili  0.4  /  DBili  x   /  AST  97<H>  /  ALT  25  /  AlkPhos  104  10-    LIVER FUNCTIONS - ( 06 Oct 2021 07:07 )  Alb: 2.6 g/dL / Pro: 5.6 g/dL / ALK PHOS: 104 U/L / ALT: 25 U/L / AST: 97 U/L / GGT: x           Urinalysis Basic - ( 05 Oct 2021 17:05 )    Color: Yellow / Appearance: Clear / S.033 / pH: x  Gluc: x / Ketone: Negative  / Bili: Negative / Urobili: Negative   Blood: x / Protein: 100 / Nitrite: Negative   Leuk Esterase: Negative / RBC: 2 /hpf / WBC 3 /HPF   Sq Epi: x / Non Sq Epi: 2 /hpf / Bacteria: Negative      MICROBIOLOGY:  RECENT CULTURES:  10-04 @ 13:33 Clean Catch Clean Catch (Midstream)     50,000 - 99,000 CFU/mL Klebsiella pneumoniae      10-04 @ 11:16 .Blood Blood-Peripheral     No growth to date.          RADIOLOGY REVIEWED:    < from: CT Chest No Cont (10.04.21 @ 20:17) >    EXAM:  CT CHEST                            PROCEDURE DATE:  10/04/2021            INTERPRETATION:  CLINICAL INFORMATION: Shortness of breath, evaluate for pneumonia    COMPARISON: CT abdomen and pelvis 10/4/2021    CONTRAST/COMPLICATIONS:  IV Contrast: None  Oral Contrast: None  Complications: No immediate complication    PROCEDURE:  CT of the Chest was performed.  Sagittal and coronal reformats were performed.    FINDINGS:    LUNGS AND AIRWAYS: Patent central airways.  Calcified nodule abutting the left anterior pleura (series 4, image 57). 3 mm nodule in the left upper lobe (series 4, image 39). Additional 2 mm nodule in the left upper lobe (series 4, image 58).  PLEURA: No pleural effusion.  MEDIASTINUM AND ERICA: Multiple enlarged supraclavicular lymph nodes. Additional axillary and mediastinal lymphadenopathy is seen.  VESSELS: Within normal limits.  HEART: Heart size is normal. No pericardial effusion.  CHEST WALL AND LOWER NECK: Calcifications within the bilateral thyroid lobes.  VISUALIZED UPPER ABDOMEN: Please refer to prior CT abdomen/pelvis.  BONES: Degenerative changes of the spine.    IMPRESSION:  Lymphadenoapthy described above is consistent with CLL as per patient history.    No CT evidence for pneumonia.    < from: CT Abdomen and Pelvis w/ IV Cont (10.04.21 @ 09:30) >    EXAM:  CT ABDOMEN AND PELVIS IC                            PROCEDURE DATE:  10/04/2021            INTERPRETATION:  CLINICAL INFORMATION: Left lower quadrant pain    COMPARISON: None.    CONTRAST/COMPLICATIONS:  IV Contrast: Omnipaque 350  90 cc administered   10 cc discarded  Oral Contrast: None  Complications: None    PROCEDURE:  CT of the Abdomen and Pelvis was performed.  Sagittal and coronal reformats were performed.    FINDINGS:  LOWER CHEST: Bibasilar atelectasis. Partially imaged enlarged right axillary lymph nodes.    LIVER: Within normal limits.  BILE DUCTS: Normal caliber.  GALLBLADDER: Within normal limits.  SPLEEN: Within normal limits.  PANCREAS: Fatty atrophy of the pancreas without cutoff. No ductal dilatation or cutoff.  ADRENALS: Within normal limits.  KIDNEYS/URETERS: Within normal limits.    BLADDER/REPRODUCTIVE ORGANS: Multiloculated right adnexal cystic mass measuring 14.6 x 9.0 cm, which abuts the appendix. Left adnexal cyst measures 2.1 x 1.4 cm. Uterus is unremarkable.    BOWEL: No bowel obstruction. Appendix is normal. Colonic diverticulosis.  PERITONEUM: 2 small nodules are present adjacent to the cystic lesion measuring 4 and 6 mm in diameter, respectively (2:75 and 79), possibly peritoneal nodules or small pericolonic lymph nodes.  VESSELS: Within normal limits.  RETROPERITONEUM/LYMPH NODES: Enlarged bilateral inguinal, iliac, mesenteric, and retroperitoneal lymph nodes.  *  Reference left inguinal lymph node measures up to 4.0 x 2.5 cm (2:93).  *  Reference left periaortic lymph node measures up to 2.5 x 2.3 cm (2:45).  ABDOMINAL WALL: Within normal limits.  BONES: Degenerative changes. Lumbar dextroscoliosis.    IMPRESSION:  Enlarged bilateral inguinal, iliac, mesenteric, and retroperitoneal lymph nodes. Partially imaged enlarged right axillary lymph nodes. Primary consideration is lymphoma. Metastatic disease is not excluded. Dedicated chest imaging is recommended for full evaluation.    Cystic right adnexal mass measuring 14.6 x 9.0 cm, concerning for epithelial neoplasm.      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 No specific localizing symptoms . Imaging reveals adenopathy and ovarian mass, cultures are neg as is rvp. SHe is growing kleb from urine with some mild pyuria, on ctx but not convinced uti is source of fevers. Today she is wheezing and has edema so some volume overload is possible but also will repeat rvp. This fever could also be related to her hematologic disease or RA as well.   Plan:  will continue ctx  f/u culture of the urine sensi  await further onc plans  repeat rvp  np to evaluate may need some diuresis

## 2021-10-06 NOTE — PROVIDER CONTACT NOTE (OTHER) - ACTION/TREATMENT ORDERED:
SILAS Moreau made aware. Nebulizer ordered. SILAS Moreau made aware. Nebulizer and chest x-ray ordered.

## 2021-10-07 LAB
ALBUMIN SERPL ELPH-MCNC: 2.7 G/DL — LOW (ref 3.3–5)
ALP SERPL-CCNC: 125 U/L — HIGH (ref 40–120)
ALT FLD-CCNC: 36 U/L — SIGNIFICANT CHANGE UP (ref 10–45)
ANION GAP SERPL CALC-SCNC: 13 MMOL/L — SIGNIFICANT CHANGE UP (ref 5–17)
AST SERPL-CCNC: 118 U/L — HIGH (ref 10–40)
BASOPHILS # BLD AUTO: 0.05 K/UL — SIGNIFICANT CHANGE UP (ref 0–0.2)
BASOPHILS NFR BLD AUTO: 2 % — SIGNIFICANT CHANGE UP (ref 0–2)
BILIRUB SERPL-MCNC: 0.3 MG/DL — SIGNIFICANT CHANGE UP (ref 0.2–1.2)
BUN SERPL-MCNC: 23 MG/DL — SIGNIFICANT CHANGE UP (ref 7–23)
CALCIUM SERPL-MCNC: 9.8 MG/DL — SIGNIFICANT CHANGE UP (ref 8.4–10.5)
CHLORIDE SERPL-SCNC: 99 MMOL/L — SIGNIFICANT CHANGE UP (ref 96–108)
CO2 SERPL-SCNC: 21 MMOL/L — LOW (ref 22–31)
CREAT SERPL-MCNC: 1.2 MG/DL — SIGNIFICANT CHANGE UP (ref 0.5–1.3)
EOSINOPHIL # BLD AUTO: 0.16 K/UL — SIGNIFICANT CHANGE UP (ref 0–0.5)
EOSINOPHIL NFR BLD AUTO: 7 % — HIGH (ref 0–6)
FOLATE SERPL-MCNC: 12.9 NG/ML — SIGNIFICANT CHANGE UP
GAMMA INTERFERON BACKGROUND BLD IA-ACNC: 1.35 IU/ML — SIGNIFICANT CHANGE UP
GLUCOSE SERPL-MCNC: 115 MG/DL — HIGH (ref 70–99)
HCT VFR BLD CALC: 31 % — LOW (ref 34.5–45)
HGB BLD-MCNC: 10.4 G/DL — LOW (ref 11.5–15.5)
LYMPHOCYTES # BLD AUTO: 0.66 K/UL — LOW (ref 1–3.3)
LYMPHOCYTES # BLD AUTO: 29 % — SIGNIFICANT CHANGE UP (ref 13–44)
M TB IFN-G BLD-IMP: NEGATIVE — SIGNIFICANT CHANGE UP
M TB IFN-G CD4+ BCKGRND COR BLD-ACNC: 0.17 IU/ML — SIGNIFICANT CHANGE UP
M TB IFN-G CD4+CD8+ BCKGRND COR BLD-ACNC: 0.11 IU/ML — SIGNIFICANT CHANGE UP
MAGNESIUM SERPL-MCNC: 2.1 MG/DL — SIGNIFICANT CHANGE UP (ref 1.6–2.6)
MANUAL SMEAR VERIFICATION: SIGNIFICANT CHANGE UP
MCHC RBC-ENTMCNC: 32.9 PG — SIGNIFICANT CHANGE UP (ref 27–34)
MCHC RBC-ENTMCNC: 33.5 GM/DL — SIGNIFICANT CHANGE UP (ref 32–36)
MCV RBC AUTO: 98.1 FL — SIGNIFICANT CHANGE UP (ref 80–100)
MONOCYTES # BLD AUTO: 0.3 K/UL — SIGNIFICANT CHANGE UP (ref 0–0.9)
MONOCYTES NFR BLD AUTO: 13 % — SIGNIFICANT CHANGE UP (ref 2–14)
NEUTROPHILS # BLD AUTO: 1.1 K/UL — LOW (ref 1.8–7.4)
NEUTROPHILS NFR BLD AUTO: 48 % — SIGNIFICANT CHANGE UP (ref 43–77)
NRBC # BLD: 1 /100 — HIGH (ref 0–0)
NT-PROBNP SERPL-SCNC: 987 PG/ML — HIGH (ref 0–300)
OSMOLALITY UR: 474 MOS/KG — SIGNIFICANT CHANGE UP (ref 300–900)
PLAT MORPH BLD: NORMAL — SIGNIFICANT CHANGE UP
PLATELET # BLD AUTO: 180 K/UL — SIGNIFICANT CHANGE UP (ref 150–400)
POTASSIUM SERPL-MCNC: 4.3 MMOL/L — SIGNIFICANT CHANGE UP (ref 3.5–5.3)
POTASSIUM SERPL-SCNC: 4.3 MMOL/L — SIGNIFICANT CHANGE UP (ref 3.5–5.3)
PROT SERPL-MCNC: 5.8 G/DL — LOW (ref 6–8.3)
QUANT TB PLUS MITOGEN MINUS NIL: 7.95 IU/ML — SIGNIFICANT CHANGE UP
RBC # BLD: 3.16 M/UL — LOW (ref 3.8–5.2)
RBC # FLD: 17.7 % — HIGH (ref 10.3–14.5)
RBC BLD AUTO: SIGNIFICANT CHANGE UP
SODIUM SERPL-SCNC: 133 MMOL/L — LOW (ref 135–145)
SODIUM UR-SCNC: 60 MMOL/L — SIGNIFICANT CHANGE UP
VARIANT LYMPHS # BLD: 1 % — SIGNIFICANT CHANGE UP (ref 0–6)
VIT B12 SERPL-MCNC: >2000 PG/ML — HIGH (ref 232–1245)
WBC # BLD: 2.29 K/UL — LOW (ref 3.8–10.5)
WBC # FLD AUTO: 2.29 K/UL — LOW (ref 3.8–10.5)
WNV IGG TITR FLD: NEGATIVE — SIGNIFICANT CHANGE UP
WNV IGM SPEC QL: NEGATIVE — SIGNIFICANT CHANGE UP

## 2021-10-07 PROCEDURE — 93306 TTE W/DOPPLER COMPLETE: CPT | Mod: 26

## 2021-10-07 PROCEDURE — 99223 1ST HOSP IP/OBS HIGH 75: CPT

## 2021-10-07 RX ORDER — POLYETHYLENE GLYCOL 3350 17 G/17G
34 POWDER, FOR SOLUTION ORAL ONCE
Refills: 0 | Status: COMPLETED | OUTPATIENT
Start: 2021-10-07 | End: 2021-10-07

## 2021-10-07 RX ORDER — VALACYCLOVIR 500 MG/1
500 TABLET, FILM COATED ORAL
Refills: 0 | Status: DISCONTINUED | OUTPATIENT
Start: 2021-10-07 | End: 2021-10-11

## 2021-10-07 RX ORDER — FUROSEMIDE 40 MG
40 TABLET ORAL DAILY
Refills: 0 | Status: DISCONTINUED | OUTPATIENT
Start: 2021-10-07 | End: 2021-10-08

## 2021-10-07 RX ADMIN — PERPHENAZINE 2 MILLIGRAM(S): 8 TABLET, FILM COATED ORAL at 21:05

## 2021-10-07 RX ADMIN — SENNA PLUS 2 TABLET(S): 8.6 TABLET ORAL at 21:05

## 2021-10-07 RX ADMIN — Medication 650 MILLIGRAM(S): at 06:36

## 2021-10-07 RX ADMIN — Medication 3 MILLILITER(S): at 06:33

## 2021-10-07 RX ADMIN — Medication 650 MILLIGRAM(S): at 14:58

## 2021-10-07 RX ADMIN — Medication 10 MILLIGRAM(S): at 21:05

## 2021-10-07 RX ADMIN — Medication 1 TABLET(S): at 12:39

## 2021-10-07 RX ADMIN — LIDOCAINE 1 PATCH: 4 CREAM TOPICAL at 17:33

## 2021-10-07 RX ADMIN — LIDOCAINE 1 PATCH: 4 CREAM TOPICAL at 04:58

## 2021-10-07 RX ADMIN — POLYETHYLENE GLYCOL 3350 17 GRAM(S): 17 POWDER, FOR SOLUTION ORAL at 06:35

## 2021-10-07 RX ADMIN — Medication 2 MILLIGRAM(S): at 06:33

## 2021-10-07 RX ADMIN — Medication 10 MILLIGRAM(S): at 16:11

## 2021-10-07 RX ADMIN — Medication 2 MILLIGRAM(S): at 17:32

## 2021-10-07 RX ADMIN — HEPARIN SODIUM 5000 UNIT(S): 5000 INJECTION INTRAVENOUS; SUBCUTANEOUS at 14:31

## 2021-10-07 RX ADMIN — Medication 500 MILLIGRAM(S): at 12:39

## 2021-10-07 RX ADMIN — Medication 40 MILLIGRAM(S): at 12:42

## 2021-10-07 RX ADMIN — Medication 650 MILLIGRAM(S): at 15:47

## 2021-10-07 RX ADMIN — VALACYCLOVIR 500 MILLIGRAM(S): 500 TABLET, FILM COATED ORAL at 21:23

## 2021-10-07 RX ADMIN — HEPARIN SODIUM 5000 UNIT(S): 5000 INJECTION INTRAVENOUS; SUBCUTANEOUS at 06:33

## 2021-10-07 RX ADMIN — POLYETHYLENE GLYCOL 3350 34 GRAM(S): 17 POWDER, FOR SOLUTION ORAL at 10:37

## 2021-10-07 RX ADMIN — CEFTRIAXONE 100 MILLIGRAM(S): 500 INJECTION, POWDER, FOR SOLUTION INTRAMUSCULAR; INTRAVENOUS at 12:38

## 2021-10-07 RX ADMIN — Medication 650 MILLIGRAM(S): at 07:27

## 2021-10-07 RX ADMIN — HEPARIN SODIUM 5000 UNIT(S): 5000 INJECTION INTRAVENOUS; SUBCUTANEOUS at 21:06

## 2021-10-07 RX ADMIN — LIDOCAINE 1 PATCH: 4 CREAM TOPICAL at 20:01

## 2021-10-07 NOTE — PROGRESS NOTE ADULT - SUBJECTIVE AND OBJECTIVE BOX
Name of Patient : PREM KAY  MRN: [id number]  Date of visit: 10-07-21 @ 10:50      Subjective: Patient seen and examined. No new events except as noted. Patient had an episode of wheezing overnight.     REVIEW OF SYSTEMS:    CONSTITUTIONAL: No weakness, fevers or chills  EYES/ENT: No visual changes;  No vertigo or throat pain   NECK: No pain or stiffness  RESPIRATORY: No cough, wheezing, hemoptysis; No shortness of breath  CARDIOVASCULAR: No chest pain or palpitations  GASTROINTESTINAL: No abdominal or epigastric pain. No nausea, vomiting, or hematemesis; No diarrhea or constipation. No melena or hematochezia.  GENITOURINARY: No dysuria, frequency or hematuria  NEUROLOGICAL: No numbness or weakness  SKIN: No itching, burning, rashes, or lesions   All other review of systems is negative unless indicated above.    MEDICATIONS:  MEDICATIONS  (STANDING):  albuterol/ipratropium for Nebulization 3 milliLiter(s) Nebulizer every 6 hours  amitriptyline 10 milliGRAM(s) Oral at bedtime  ascorbic acid 500 milliGRAM(s) Oral daily  bisacodyl Suppository 10 milliGRAM(s) Rectal once  cefTRIAXone   IVPB 1000 milliGRAM(s) IV Intermittent every 24 hours  furosemide   Injectable 40 milliGRAM(s) IV Push daily  heparin   Injectable 5000 Unit(s) SubCutaneous every 8 hours  influenza   Vaccine 0.5 milliLiter(s) IntraMuscular once  lidocaine   4% Patch 1 Patch Transdermal every 24 hours  LORazepam     Tablet 2 milliGRAM(s) Oral two times a day  multivitamin 1 Tablet(s) Oral daily  perphenazine 2 milliGRAM(s) Oral at bedtime  polyethylene glycol 3350 17 Gram(s) Oral two times a day  senna 2 Tablet(s) Oral at bedtime      PHYSICAL EXAM:  T(C): 36.7 (10-07-21 @ 05:10), Max: 37.7 (10-07-21 @ 01:02)  HR: 81 (10-07-21 @ 05:10) (77 - 93)  BP: 125/73 (10-07-21 @ 05:10) (111/66 - 144/68)  RR: 18 (10-07-21 @ 05:10) (18 - 18)  SpO2: 97% (10-07-21 @ 05:10) (94% - 97%)  Wt(kg): --  I&O's Summary    06 Oct 2021 07:01  -  07 Oct 2021 07:00  --------------------------------------------------------  IN: 1500 mL / OUT: 2150 mL / NET: -650 mL    07 Oct 2021 07:01  -  07 Oct 2021 10:50  --------------------------------------------------------  IN: 0 mL / OUT: 200 mL / NET: -200 mL          Appearance: Normal	  HEENT:  PERRLA   Lymphatic: No lymphadenopathy   Cardiovascular: Normal S1 S2, no JVD  Respiratory: normal effort , clear  Gastrointestinal:  Soft, Non-tender  Skin: No rashes,  warm to touch  Psychiatry:  Mood & affect appropriate  Musculuskeletal: No edema      All labs, Imaging and EKGs personally reviewed                           10.4   2.29  )-----------( 180      ( 07 Oct 2021 07:17 )             31.0               10    133<L>  |  99  |  23  ----------------------------<  115<H>  4.3   |  21<L>  |  1.20    Ca    9.8      07 Oct 2021 07:17  Mg     2.1     10-    TPro  5.8<L>  /  Alb  2.7<L>  /  TBili  0.3  /  DBili  x   /  AST  118<H>  /  ALT  36  /  AlkPhos  125<H>  10-07           Urinalysis Basic - ( 05 Oct 2021 17:05 )    Color: Yellow / Appearance: Clear / S.033 / pH: x  Gluc: x / Ketone: Negative  / Bili: Negative / Urobili: Negative   Blood: x / Protein: 100 / Nitrite: Negative   Leuk Esterase: Negative / RBC: 2 /hpf / WBC 3 /HPF   Sq Epi: x / Non Sq Epi: 2 /hpf / Bacteria: Negative        10-06-21 @ 07:01  -  10-07-21 @ 07:00  --------------------------------------------------------  IN: 1500 mL / OUT: 2150 mL / NET: -650 mL    10-07-21 @ 07:01  -  10-07-21 @ 10:50  --------------------------------------------------------  IN: 0 mL / OUT: 200 mL / NET: -200 mL      CXR preliminary report:   < from: Xray Chest 1 View- PORTABLE-Urgent (Xray Chest 1 View- PORTABLE-Urgent .) (10.06.21 @ 22:07) >  INTERPRETATION:  mild pulmonary vascular congestion    < end of copied text >                     Name of Patient : PREM KAY  MRN: [id number]  Date of visit: 10-07-21 @ 10:50      Subjective: Patient seen and examined. No new events except as noted. Patient had an episode of wheezing overnight.     REVIEW OF SYSTEMS:    CONSTITUTIONAL: + weakness  EYES/ENT: No visual changes;  No vertigo or throat pain   NECK: No pain or stiffness  RESPIRATORY: No cough, wheezing, hemoptysis; No shortness of breath  CARDIOVASCULAR: No chest pain or palpitations  GASTROINTESTINAL:+abdominal pain.   GENITOURINARY: No dysuria, frequency or hematuria  NEUROLOGICAL: No numbness or weakness  SKIN: No itching, burning, rashes, or lesions   All other review of systems is negative unless indicated above.    MEDICATIONS:  MEDICATIONS  (STANDING):  albuterol/ipratropium for Nebulization 3 milliLiter(s) Nebulizer every 6 hours  amitriptyline 10 milliGRAM(s) Oral at bedtime  ascorbic acid 500 milliGRAM(s) Oral daily  bisacodyl Suppository 10 milliGRAM(s) Rectal once  cefTRIAXone   IVPB 1000 milliGRAM(s) IV Intermittent every 24 hours  furosemide   Injectable 40 milliGRAM(s) IV Push daily  heparin   Injectable 5000 Unit(s) SubCutaneous every 8 hours  influenza   Vaccine 0.5 milliLiter(s) IntraMuscular once  lidocaine   4% Patch 1 Patch Transdermal every 24 hours  LORazepam     Tablet 2 milliGRAM(s) Oral two times a day  multivitamin 1 Tablet(s) Oral daily  perphenazine 2 milliGRAM(s) Oral at bedtime  polyethylene glycol 3350 17 Gram(s) Oral two times a day  senna 2 Tablet(s) Oral at bedtime      PHYSICAL EXAM:  T(C): 36.7 (10-07-21 @ 05:10), Max: 37.7 (10-07-21 @ 01:02)  HR: 81 (10-07-21 @ 05:10) (77 - 93)  BP: 125/73 (10-07-21 @ 05:10) (111/66 - 144/68)  RR: 18 (10-07-21 @ 05:10) (18 - 18)  SpO2: 97% (10-07-21 @ 05:10) (94% - 97%)  Wt(kg): --  I&O's Summary    06 Oct 2021 07:01  -  07 Oct 2021 07:00  --------------------------------------------------------  IN: 1500 mL / OUT: 2150 mL / NET: -650 mL    07 Oct 2021 07:01  -  07 Oct 2021 10:50  --------------------------------------------------------  IN: 0 mL / OUT: 200 mL / NET: -200 mL          Appearance: Normal	  HEENT:  PERRLA   Lymphatic: No lymphadenopathy   Cardiovascular: Normal S1 S2, no JVD  Respiratory: normal effort , clear  Gastrointestinal:  Soft, tender on palpation   Skin: No rashes,  warm to touch  Psychiatry:  Mood & affect appropriate  Musculuskeletal: No edema      All labs, Imaging and EKGs personally reviewed                           10.4   2.29  )-----------( 180      ( 07 Oct 2021 07:17 )             31.0               10-07    133<L>  |  99  |  23  ----------------------------<  115<H>  4.3   |  21<L>  |  1.20    Ca    9.8      07 Oct 2021 07:17  Mg     2.1     10-07    TPro  5.8<L>  /  Alb  2.7<L>  /  TBili  0.3  /  DBili  x   /  AST  118<H>  /  ALT  36  /  AlkPhos  125<H>  10           Urinalysis Basic - ( 05 Oct 2021 17:05 )    Color: Yellow / Appearance: Clear / S.033 / pH: x  Gluc: x / Ketone: Negative  / Bili: Negative / Urobili: Negative   Blood: x / Protein: 100 / Nitrite: Negative   Leuk Esterase: Negative / RBC: 2 /hpf / WBC 3 /HPF   Sq Epi: x / Non Sq Epi: 2 /hpf / Bacteria: Negative        10-06-21 @ 07:01  -  10-07-21 @ 07:00  --------------------------------------------------------  IN: 1500 mL / OUT: 2150 mL / NET: -650 mL    10-07-21 @ 07:01  -  10-07-21 @ 10:50  --------------------------------------------------------  IN: 0 mL / OUT: 200 mL / NET: -200 mL      CXR preliminary report:   < from: Xray Chest 1 View- PORTABLE-Urgent (Xray Chest 1 View- PORTABLE-Urgent .) (10.06.21 @ 22:07) >  INTERPRETATION:  mild pulmonary vascular congestion    < end of copied text >

## 2021-10-07 NOTE — PROGRESS NOTE ADULT - SUBJECTIVE AND OBJECTIVE BOX
DATE OF SERVICE: 10-07-21 @ 23:49    Patient is a 80y old  Female who presents with a chief complaint of Fall (06 Oct 2021 18:24)      INTERVAL HISTORY:     TELEMETRY Personally reviewed:  	  MEDICATIONS:  furosemide   Injectable 40 milliGRAM(s) IV Push daily        PHYSICAL EXAM:  T(C): 36.7 (10-07-21 @ 21:50), Max: 37.7 (10-07-21 @ 01:02)  HR: 92 (10-07-21 @ 21:50) (72 - 92)  BP: 142/84 (10-07-21 @ 21:50) (121/71 - 150/79)  RR: 18 (10-07-21 @ 21:50) (18 - 18)  SpO2: 97% (10-07-21 @ 21:50) (95% - 97%)  Wt(kg): --  I&O's Summary    06 Oct 2021 07:01  -  07 Oct 2021 07:00  --------------------------------------------------------  IN: 1500 mL / OUT: 2150 mL / NET: -650 mL    07 Oct 2021 07:01  -  07 Oct 2021 23:49  --------------------------------------------------------  IN: 490 mL / OUT: 900 mL / NET: -410 mL          Appearance: In no distress	  HEENT:    PERRL, EOMI	  Cardiovascular:  S1 S2, No JVD  Respiratory: Lungs clear to auscultation	  Gastrointestinal:  Soft, Non-tender, + BS	  Vascularature:  No edema of LE  Psychiatric: Appropriate affect   Neuro: no acute focal deficits                               10.4   2.29  )-----------( 180      ( 07 Oct 2021 07:17 )             31.0     10-07    133<L>  |  99  |  23  ----------------------------<  115<H>  4.3   |  21<L>  |  1.20    Ca    9.8      07 Oct 2021 07:17  Mg     2.1     10-07    TPro  5.8<L>  /  Alb  2.7<L>  /  TBili  0.3  /  DBili  x   /  AST  118<H>  /  ALT  36  /  AlkPhos  125<H>  10-07        Labs personally reviewed      EKG: Personally reviewed by me - Sinus arrythmia   Radiology: Personally reviewed by me -   chest xray mild pulm edema         Assessment and Plan:   · Assessment	  Patient is a 80 PMHx of new onset CLL, with possible staging of ovarian cancer, HTN, HLD, CC s/p fall and fevers.  Per daughter, pt lives at home alone, fell two times while getting out of bed. States she fell onto both hands and knees while rolling out of bed. No LOC.  Denied any lightheadedness, dizziness at the time of fall.  Currently states she has some dizziness, like she would fall over if she had to walk. Was brought in today due to having a fever. PT complaining of SOB as well, and has pain in both knees. Denies cough, headache, chest pain, nausea, vomiting. Has not started any chemotherapy per daughter    # AMS and dizziness   check orthostatics  Neurology eval appreciated follow up recs  - unlikely cardiac     # SOB  d/pablito IVF as CXR with mild pulm vasc congestion  - Recurrent SOB again this am  - Lasix 40mg IV daily, close monitor Cr given recent HARJIT    # HARJIT   HARJIT resolved    # HTN   resume BP meds   adjust as tolerated     # HLD   lipid panel           Dallas Monaco DO [facility]  Cardiovascular Medicine  [address], [address]  Office: [phone]  Cell: [phone]

## 2021-10-07 NOTE — CONSULT NOTE ADULT - ASSESSMENT
Assessment/Plan:  1) Ulcers of the genital region- High suspicion for viral etiology given patient's history, immunosuppression (on MTX for RA), and clinical appearance of lesions.    At this time:  - Swabbed lesions for HSV and CMV pcr, will f/u results  - Can continue with Valtrex 500mg bid suppressive dosing for now    Shine Stahl MD  Resident Physician, PGY3  [facility] Dermat[facility]  Pager: [phone]  Office: [phone]    The patient's chart was reviewed in addition to being seen and examined at bedside with the dermatology attending Dr. Velasquez  Recommendations were communicated with the primary team.  Please page [phone] for further related questions.

## 2021-10-07 NOTE — CONSULT NOTE ADULT - ATTENDING COMMENTS
80-yr-old woman with recent diagnosis of SLL/CLL, planned for treatment initiation by primary hematologist, admitted for AMS found to have UTI. Patient seen in consult for CLL. Patient has recent decline in functional status. Patient's recent PET-CT showed high FDG avid necrotic LN (neck) suspicious for different type of lymphoma. Patient also has adnexal mass of ~15 cm which is indeterminate. Agree with house-staff suggestion on tumor markers. For CLL, if ovarian malignancy is ruled out, consider a biopsy of the high FDG neck LN to r/o progression. If her recent decline in status can be attributed to CLL, treatment initiation may be considered. Obtain IgG level, IVIG is <600. Follow up as outpatient. 80-yr-old woman with recent diagnosis of SLL/CLL, planned for treatment initiation by primary hematologist, admitted for AMS found to have UTI. Patient seen in consult for CLL. Patient has recent decline in functional status. Patient's recent PET-CT showed high FDG avid necrotic LN (neck) suspicious for different type of lymphoma. Patient also has adnexal mass of ~15 cm which is indeterminate. Agree with house-staff suggestion on tumor markers. For CLL, if ovarian malignancy is ruled out, consider a biopsy of the high FDG neck LN to r/o progression. If her recent decline in status can be attributed to CLL, treatment initiation would be ideal. Obtain IgG level, IVIG if IgG <600. Follow up as outpatient. Complex psychosocial needs/coping issues

## 2021-10-07 NOTE — PROGRESS NOTE ADULT - ASSESSMENT
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  She is being evaluated for ovarian cancer for c/o lower abdominal pain as outpatient.   She developed fever on 10/03 with accelerated weakness and came to ER for evaluation.   Fever to 101.4 in the ER prompted CTX.  Etiology of fever not clear although "B" symptoms of lymphoma possible but her decline in functional status seemed acute.   Reported abdominal pain and bloating but CT scan without acute pathology.   UA with 14 WBCs, urine cx with Klebsiella - doubt UTI   Blood cx NGTD   On empiric Ceftriaxone  CT chest without PNA  Repeat RVP still neg   Repeated urine cx & blood cx NGTD  Afebrile now, but weak & deconditioned, not interactive    Suggest:  Follow blood & urine culture  WNV serology, Quant TB serology  Limit empiric Ceftriaxone to another 24 hrs   Pt will need to follow up with her primary oncologist to review further mgmt plans

## 2021-10-07 NOTE — CONSULT NOTE ADULT - ASSESSMENT
Ms. Quezada is an 79yo postmenopausal, P4 with PMH including newly diagnosed SLL/CLL, HTN, Alzheimer's vs ?Parkinson's, RA, and known 14cm complex ovarian mass, admitted to Medicine on 10/4/2021 for treatment of urosepsis and status post multiple falls at home since sudden onset of weakness 10/4/21. Gyn-Onc consulted for recommendations regarding known 14.6cm x 9cm right-sided ovarian mass. Prior to performance of multiple biopsies the patient was informed that the only way to determine the etiology of her adnexal mass was to undergo surgical resection of the mass and/or if it was in fact cancer of gynecologic origin and metastasized to obtain a tissue sample from distant mets. As her biopsies have consistently shown SLL/CLL there is no evidence of confirmed gynecologic cancer or metastasis. She is not a surgical candidate at this time. Although high suspicion of ovarian cancer, further workup for adnexal mass is deferred at this time as patient is not a candidate for surgical resection. No indication for GYN/GYNONC intervention at this time. This was discussed with the patient and daughter, Sharal, who both expressed understanding.       Patient and daughter advised to follow up outpatient with Dr. Leyva    Patient discussed with fellow, Dr. Moss.     Ingrid Cardenas, MS4    Melva Downs MD  OBGYN PGY3  Ms. Quezada is an 81yo postmenopausal, P4 with PMH including newly diagnosed SLL/CLL, HTN, Alzheimer's vs ?Parkinson's, RA, and known 14cm complex ovarian mass, admitted to Medicine on 10/4/2021 for treatment of urosepsis and status post multiple falls at home since sudden onset of weakness 10/4/21. Gyn-Onc consulted for recommendations regarding known 14.6cm x 9cm right-sided ovarian mass. Prior to performance of multiple biopsies the patient was informed that the only way to determine the etiology of her adnexal mass was to undergo surgical resection of the mass and/or if it was in fact cancer of gynecologic origin and metastasized to obtain a tissue sample from distant mets. As her biopsies have consistently shown SLL/CLL there is no evidence of confirmed gynecologic cancer or metastasis. She is not a surgical candidate at this time.  No indication for GYN/GYNONC intervention at this time. This was discussed with the patient and daughter, Sharla, who both expressed understanding.       Patient and daughter advised to follow up outpatient with Dr. Leyva    Patient discussed with fellow, Dr. Moss.     Ingrid Cardenas, MS4    Melva Downs MD  OBGYN PGY3

## 2021-10-07 NOTE — PROGRESS NOTE ADULT - ASSESSMENT
Patient is a 80 PMHx of new onset CLL, with possible staging of ovarian cancer, HTN, HLD, CC s/p fall and fevers.  Per daughter, pt lives at home alone, fell two times while getting out of bed. States she fell onto both hands and knees while rolling out of bed. No LOC.  Denied any lightheadedness, dizziness at the time of fall.  Currently states she has some dizziness, like she would fall over if she had to walk. Was brought in today due to having a fever. PT complaining of SOB as well, and has pain in both knees. Denies cough, headache, chest pain, nausea, vomiting. Has not started any chemotherapy per daughter    # AMS and dizziness   CT head noted  fall precautions  check orthostatics  Neurology eval appreciated follow up recs    # Sepsis  R/O sepsis   UTI vs tumor fever vs other etiologies   Awaiting Pan cx results  cont Rocephin for now  monitor clinically   ID eval appreciated   Hydration as tolerated  Pan CT noted      # HARJIT   HARJIT resolved  IV hydration discontinued due to episode of wheezing  Monitor renal function  I and Os     # Ovarian Ca  with mets  seen on CT   onc follow up   Onc eval called, patient follows with MONTR, will call     # HTN   resume BP meds   adjust as tolerated     # HLD   lipid panel     Wheezing  Cont Duonebs  D/C IVF  Increased lasix to 40 mg and monitor for improvement   BNP results noted. Will follow  Echo ordered, awaiting results    DVT and GI PPX    Patient is a 80 PMHx of new onset CLL, with possible staging of ovarian cancer, HTN, HLD, CC s/p fall and fevers.  Per daughter, pt lives at home alone, fell two times while getting out of bed. States she fell onto both hands and knees while rolling out of bed. No LOC.  Denied any lightheadedness, dizziness at the time of fall.  Currently states she has some dizziness, like she would fall over if she had to walk. Was brought in today due to having a fever. PT complaining of SOB as well, and has pain in both knees. Denies cough, headache, chest pain, nausea, vomiting. Has not started any chemotherapy per daughter    # AMS and dizziness   CT head noted  fall precautions  check orthostatics  Neurology eval appreciated follow up recs    # Sepsis  R/O sepsis   UTI vs tumor fever vs other etiologies   Awaiting Pan cx results  cont Rocephin for now  monitor clinically   ID eval appreciated   Hydration as tolerated  Pan CT noted      # HARJIT   HARJIT resolved  IV hydration discontinued due to episode of wheezing  Monitor renal function  I and Os     # Ovarian Ca  with mets  seen on CT   onc follow up   Onc eval called, patient follows with MONTR, will call     # HTN   resume BP meds   adjust as tolerated     # HLD   lipid panel     #Wheezing, fluid overload likely   Cont Duonebs  D/C IVF  Increased lasix to 40 mg IVP  and monitor for improvement   BNP results noted. Will follow  Echo ordered, awaiting results  Card eval appreciated     DVT and GI PPX

## 2021-10-07 NOTE — CHART NOTE - NSCHARTNOTEFT_GEN_A_CORE
MEDICINE NP    PREM KAY  80y Female    Patient is a 80y old  Female who presents with a chief complaint of Fall (06 Oct 2021 18:24)       > Event Summary:  Notified by RN, Patient noted with wheezing and SOB and seen at bedside.   Patient awake and alert, in NAD, denies sob.    -Vital Signs Last 24 Hrs  T(C): 36.7 (06 Oct 2021 22:29), Max: 37.2 (06 Oct 2021 01:15)  T(F): 98.1 (06 Oct 2021 22:29), Max: 99 (06 Oct 2021 01:15)  HR: 93 (06 Oct 2021 22:29) (77 - 93)  BP: 136/74 (06 Oct 2021 22:29) (111/66 - 157/75)  RR: 18 (06 Oct 2021 22:29) (18 - 18)  SpO2: 96% (06 Oct 2021 22:29) (94% - 97%)    > Physical Assessment:  General: Awake, alert, No acute distress.   CV: +S1S2, RRR.  No peripheral edema  Respiratory: +Upper wheezing.  Even, unlabored.  +Tachypnea RR=28.  Lungs with rales @bases.  No accessory muscle use noted.    Abdomen:  +BS.  Soft, NT, ND.  No palpable mass  MSK: HASTINGS x4.   Skin: Warm and Dry       >RADIOLOGY:  - Xray Chest 1 View- PORTABLE-Urgent (Xray Chest 1 View- PORTABLE-Urgent .) (10.06.21 @ 22:07) >  **PRELIMINARY REPORT******   INTERPRETATION:  mild pulmonary vascular congestion  follow up official report in AM  < end of copied text >      > Assessment & Plan:  HPI:  Patient is a 80 PMHx of new onset CLL with possible staging of Ovarian cancer (has not started any chemotherapy per daughter), HTN, HLD, CC s/p fall and fevers.  Per daughter, pt lives at home alone, fell two times while getting out of bed. States she fell onto both hands and knees while rolling out of bed. No LOC.  Was brought in today due to having a fever. Patient complaining of SOB as well, and has pain in both knees. Denies cough, headache, chest pain, nausea, vomiting.  (04 Oct 2021 15:49).  Admitted with AMS/ Sepsis likely 2/2 to UTI on CTX,  S/p Fall with CTH , C-spine neg, and Hyponatremia,  on ivf.  Now with Wheezing.    1. Wheezing -Likely Overload  2/2 IVF   -Duonebs x1 now & Q6hrs  -IVF d/c'd, and f/u NA in AM  -STAT CXR - prelim w/ mild pulmonary congestion  -Keep SpO2 >90%   -D/w Attending, Dr. Smith, and reviewed above, requests Lasix 20mg ivp x1 and monitor        Lizzeth Galvin, [facility]  Medicine Department  #[id number]

## 2021-10-07 NOTE — CONSULT NOTE ADULT - ATTENDING COMMENTS
Exam findings favor recurrent/persistent HSV infection. Lesions also swabbed for CMV (results may take 1-2 weeks). Continue with ppx dosing of acyclovir as she recently completed 10 days of treatment dosing. Would apply zinc oxide to uncovered perianal lesions to avoid further irritation that may delay healing. t may also benefit form lidocaine 5% ointment for pain relief.

## 2021-10-07 NOTE — CONSULT NOTE ADULT - ASSESSMENT
80y/p F w/ PMH of CLL, Dementia, HTN, HLD, and RA was admitted due to AMS found to have UTI. Hematology was consulted due to CLL and ovarian mass.    # CLL and ovarian mass    - while the inguinal lymph node Bx showed CLL, primary ovarian CA cannot be excluded, especially considering the size and nature of the mass shown on PETCT  - CLL can also predispose pt to malignancy as well  - w/o tissue of ovarian mass, pathology cannot be discerned  - however, considering pt's comorbidities and age, benefits and risks need to be balanced before pursuing surgical intervention. Discussed w/ family in length about medical vs surgical decision  - as of now, treat UTI per primary team  - will check immunoglobulin levels, and if deficient, can give IVIG 400mg/kg to help w/ UTI  - recommend DVT studies due to hypercoagulable state and physical exam findings of b/l calf tenderness  - recommend Gyn Onc consult re ovarian mass    NOTE NOT FINAL 80y/p F w/ PMH of CLL, Dementia, HTN, HLD, and RA was admitted due to AMS found to have UTI. Hematology was consulted due to CLL and ovarian mass.    # CLL and ovarian mass    - while the inguinal lymph node Bx showed CLL, primary ovarian CA cannot be excluded, especially considering the size and nature of the mass shown on PETCT  - CLL can also predispose pt to malignancy as well  - w/o tissue of ovarian mass, pathology cannot be discerned  - however, considering pt's comorbidities and age, benefits and risks need to be balanced before pursuing surgical intervention. Discussed w/ family in length about medical vs surgical decision  - as of now, treat UTI per primary team  - will check immunoglobulin levels, and if deficient, f/u w/ hematology  - recommend DVT studies due to hypercoagulable state and physical exam findings of b/l calf tenderness  - recommend Gyn Onc consult re ovarian mass    NOTE NOT FINAL 80y/p F w/ PMH of CLL, Dementia, HTN, HLD, and RA was admitted due to AMS found to have UTI. Hematology was consulted due to CLL and ovarian mass.    # CLL and ovarian mass    - while the inguinal lymph node Bx showed CLL, primary ovarian CA cannot be excluded, especially considering the size and nature of the mass shown on PETCT  - CLL can also predispose pt to malignancy as well  - the disease burden unlikely justifies for CLL/SLL and pt unlikely needs tx for CLL/SLL for now  - w/o tissue of ovarian mass, pathology cannot be discerned  - however, considering pt's comorbidities and age, benefits and risks need to be balanced before pursuing surgical intervention. Discussed w/ family in length about medical vs surgical decision  - as of now, treat UTI per primary team  - will check immunoglobulin levels, and if deficient, f/u w/ hematology  - recommend  and CEA for tumor markers  - recommend DVT studies due to hypercoagulable state and physical exam findings of b/l calf tenderness  - recommend Gyn Onc consult re ovarian mass    Case d/w Dr. Abdirizak Iyer MD PhD  PGY1

## 2021-10-07 NOTE — PROGRESS NOTE ADULT - SUBJECTIVE AND OBJECTIVE BOX
CC: f/u for fever     Patient reports nothing     REVIEW OF SYSTEMS:  All other review of systems negative (Comprehensive ROS)    Antimicrobials Day #  : 4  cefTRIAXone   IVPB 1000 milliGRAM(s) IV Intermittent every 24 hours    Other Medications Reviewed    T(F): 98 (10-07-21 @ 05:10), Max: 99.8 (10-07-21 @ 01:02)  HR: 81 (10-07-21 @ 05:10)  BP: 125/73 (10-07-21 @ 05:10)  RR: 18 (10-07-21 @ 05:10)  SpO2: 97% (10-07-21 @ 05:10)  Wt(kg): --    PHYSICAL EXAM:  General: alert, no acute distress  Eyes:  anicteric, no conjunctival injection, no discharge  Oropharynx: no lesions or injection 	  Neck: supple  Lungs: clear to auscultation  Heart: regular rate and rhythm; no murmurs  Abdomen: soft, nondistended, nontender, without mass or organomegaly  Skin: no lesions  Extremities: no clubbing, cyanosis, or edema  Neurologic: alert, oriented, moves all extremities    LAB RESULTS:                        10.4   2.29  )-----------( 180      ( 07 Oct 2021 07:17 )             31.0     10    133<L>  |  99  |  23  ----------------------------<  115<H>  4.3   |  21<L>  |  1.20    Ca    9.8      07 Oct 2021 07:17  Mg     2.1     10-07    TPro  5.8<L>  /  Alb  2.7<L>  /  TBili  0.3  /  DBili  x   /  AST  118<H>  /  ALT  36  /  AlkPhos  125<H>  10    LIVER FUNCTIONS - ( 07 Oct 2021 07:17 )  Alb: 2.7 g/dL / Pro: 5.8 g/dL / ALK PHOS: 125 U/L / ALT: 36 U/L / AST: 118 U/L / GGT: x           Urinalysis Basic - ( 05 Oct 2021 17:05 )    Color: Yellow / Appearance: Clear / S.033 / pH: x  Gluc: x / Ketone: Negative  / Bili: Negative / Urobili: Negative   Blood: x / Protein: 100 / Nitrite: Negative   Leuk Esterase: Negative / RBC: 2 /hpf / WBC 3 /HPF   Sq Epi: x / Non Sq Epi: 2 /hpf / Bacteria: Negative      MICROBIOLOGY:  RECENT CULTURES:  10-05 @ 21:26 Clean Catch Clean Catch (Midstream)     <10,000 CFU/mL Normal Urogenital Kaylyn      10-05 @ 14:55 .Blood Blood-Peripheral     No growth to date.      10-04 @ 13:33 Clean Catch Clean Catch (Midstream) Klebsiella pneumoniae    50,000 - 99,000 CFU/mL Klebsiella pneumoniae      10-04 @ 11:16 .Blood Blood-Peripheral     No growth to date.                RADIOLOGY REVIEWED:

## 2021-10-07 NOTE — PROGRESS NOTE ADULT - SUBJECTIVE AND OBJECTIVE BOX
[facility]   MD LAN RIVERO MD RUORU WONG, PA    TEL:  OFFICE: [phone]  DR VILLASENOR CELL: [phone]  HERMELINDA COLEMAN CELL: [phone]  DR. CAMARA CELL: [phone]      FROM 5 PM - 7 AM PLEASE CALL ANSWERING SERVICE: [phone]    RENAL FOLLOW UP NOTE--Date of Service 10-07-21 @ 09:41  --------------------------------------------------------------------------------  HPI:      Pt seen and examined at bedside.       PAST HISTORY  --------------------------------------------------------------------------------  No significant changes to PMH, PSH, FHx, SHx, unless otherwise noted    ALLERGIES & MEDICATIONS  --------------------------------------------------------------------------------  Allergies    penicillin (Unknown)    Intolerances    Biaxin (Other)    Standing Inpatient Medications  albuterol/ipratropium for Nebulization 3 milliLiter(s) Nebulizer every 6 hours  amitriptyline 10 milliGRAM(s) Oral at bedtime  ascorbic acid 500 milliGRAM(s) Oral daily  cefTRIAXone   IVPB 1000 milliGRAM(s) IV Intermittent every 24 hours  heparin   Injectable 5000 Unit(s) SubCutaneous every 8 hours  influenza   Vaccine 0.5 milliLiter(s) IntraMuscular once  lidocaine   4% Patch 1 Patch Transdermal every 24 hours  LORazepam     Tablet 2 milliGRAM(s) Oral two times a day  multivitamin 1 Tablet(s) Oral daily  perphenazine 2 milliGRAM(s) Oral at bedtime  polyethylene glycol 3350 17 Gram(s) Oral two times a day  senna 2 Tablet(s) Oral at bedtime    PRN Inpatient Medications  acetaminophen   Tablet .. 650 milliGRAM(s) Oral every 6 hours PRN  acetaminophen   Tablet .. 650 milliGRAM(s) Oral every 6 hours PRN      REVIEW OF SYSTEMS  --------------------------------------------------------------------------------  General: no fever  MSK: no edema     VITALS/PHYSICAL EXAM  --------------------------------------------------------------------------------  T(C): 36.7 (10-07-21 @ 05:10), Max: 37.7 (10-07-21 @ 01:02)  HR: 81 (10-07-21 @ 05:10) (77 - 93)  BP: 125/73 (10-07-21 @ 05:10) (111/66 - 144/68)  RR: 18 (10-07-21 @ 05:10) (18 - 18)  SpO2: 97% (10-07-21 @ 05:10) (94% - 97%)  Wt(kg): --        10-06-21 @ 07:01  -  10-07-21 @ 07:00  --------------------------------------------------------  IN: 1500 mL / OUT: 2150 mL / NET: -650 mL      Physical Exam:  	Gen: NAD  	HEENT: MMM  	Pulm: Coarse breath sounds  B/L  	CV: S1S2  	Abd: Soft, +BS  	Ext: No LE edema B/L                      Neuro: Awake   	Skin: Warm and Dry   	Vascular access: NO HD catheter            no gretel  LABS/STUDIES  --------------------------------------------------------------------------------              10.4   2.29  >-----------<  180      [10-07-21 @ 07:17]              31.0     133  |  99  |  23  ----------------------------<  115      [10-07-21 @ 07:17]  4.3   |  21  |  1.20        Ca     9.8     [10-07-21 @ 07:17]      Mg     2.1     [10-07-21 @ 07:17]    TPro  5.8  /  Alb  2.7  /  TBili  0.3  /  DBili  x   /  AST  118  /  ALT  36  /  AlkPhos  125  [10-07-21 @ 07:17]          Creatinine Trend:  SCr 1.20 [10-07 @ 07:17]  SCr 1.28 [10-06 @ 07:07]  SCr 1.52 [10-05 @ 07:40]  SCr 1.43 [10-04 @ 08:21]    Urinalysis - [10-05-21 @ 17:05]      Color Yellow / Appearance Clear / SG 1.033 / pH 6.0      Gluc Negative / Ketone Negative  / Bili Negative / Urobili Negative       Blood Moderate / Protein 100 / Leuk Est Negative / Nitrite Negative      RBC 2 / WBC 3 / Hyaline 1 / Gran  / Sq Epi  / Non Sq Epi 2 / Bacteria Negative    Urine Creatinine 77      [10-05-21 @ 17:05]  Urine Sodium 29      [10-05-21 @ 17:05]  Urine Osmolality 509      [10-05-21 @ 17:05]    TSH 1.15      [10-05-21 @ 16:30]  Lipid: chol 87, , HDL 15, LDL --      [10-05-21 @ 16:30]

## 2021-10-08 DIAGNOSIS — R19.00 INTRA-ABDOMINAL AND PELVIC SWELLING, MASS AND LUMP, UNSPECIFIED SITE: ICD-10-CM

## 2021-10-08 LAB
ALBUMIN SERPL ELPH-MCNC: 2.7 G/DL — LOW (ref 3.3–5)
ALP SERPL-CCNC: 126 U/L — HIGH (ref 40–120)
ALT FLD-CCNC: 35 U/L — SIGNIFICANT CHANGE UP (ref 10–45)
ANION GAP SERPL CALC-SCNC: 12 MMOL/L — SIGNIFICANT CHANGE UP (ref 5–17)
AST SERPL-CCNC: 95 U/L — HIGH (ref 10–40)
BASOPHILS # BLD AUTO: 0.08 K/UL — SIGNIFICANT CHANGE UP (ref 0–0.2)
BASOPHILS NFR BLD AUTO: 2.7 % — HIGH (ref 0–2)
BILIRUB SERPL-MCNC: 0.3 MG/DL — SIGNIFICANT CHANGE UP (ref 0.2–1.2)
BUN SERPL-MCNC: 19 MG/DL — SIGNIFICANT CHANGE UP (ref 7–23)
CALCIUM SERPL-MCNC: 10.2 MG/DL — SIGNIFICANT CHANGE UP (ref 8.4–10.5)
CANCER AG125 SERPL-ACNC: 142 U/ML — HIGH
CEA SERPL-MCNC: 7.3 NG/ML — HIGH (ref 0–3.8)
CHLORIDE SERPL-SCNC: 99 MMOL/L — SIGNIFICANT CHANGE UP (ref 96–108)
CO2 SERPL-SCNC: 25 MMOL/L — SIGNIFICANT CHANGE UP (ref 22–31)
CREAT SERPL-MCNC: 1.31 MG/DL — HIGH (ref 0.5–1.3)
EOSINOPHIL # BLD AUTO: 0.3 K/UL — SIGNIFICANT CHANGE UP (ref 0–0.5)
EOSINOPHIL NFR BLD AUTO: 10.7 % — HIGH (ref 0–6)
GLUCOSE SERPL-MCNC: 93 MG/DL — SIGNIFICANT CHANGE UP (ref 70–99)
HCT VFR BLD CALC: 32.5 % — LOW (ref 34.5–45)
HGB BLD-MCNC: 10.4 G/DL — LOW (ref 11.5–15.5)
HSV+VZV DNA SPEC QL NAA+PROBE: SIGNIFICANT CHANGE UP
IGA FLD-MCNC: 125 MG/DL — SIGNIFICANT CHANGE UP (ref 84–499)
IGG FLD-MCNC: 979 MG/DL — SIGNIFICANT CHANGE UP (ref 610–1660)
IGM SERPL-MCNC: 52 MG/DL — SIGNIFICANT CHANGE UP (ref 35–242)
KAPPA LC SER QL IFE: 22.16 MG/DL — HIGH (ref 0.33–1.94)
KAPPA/LAMBDA FREE LIGHT CHAIN RATIO, SERUM: 7.86 RATIO — HIGH (ref 0.26–1.65)
LAMBDA LC SER QL IFE: 2.82 MG/DL — HIGH (ref 0.57–2.63)
LYMPHOCYTES # BLD AUTO: 0.42 K/UL — LOW (ref 1–3.3)
LYMPHOCYTES # BLD AUTO: 15.2 % — SIGNIFICANT CHANGE UP (ref 13–44)
MAGNESIUM SERPL-MCNC: 2 MG/DL — SIGNIFICANT CHANGE UP (ref 1.6–2.6)
MANUAL SMEAR VERIFICATION: SIGNIFICANT CHANGE UP
MCHC RBC-ENTMCNC: 32 GM/DL — SIGNIFICANT CHANGE UP (ref 32–36)
MCHC RBC-ENTMCNC: 32.2 PG — SIGNIFICANT CHANGE UP (ref 27–34)
MCV RBC AUTO: 100.6 FL — HIGH (ref 80–100)
MONOCYTES # BLD AUTO: 0.32 K/UL — SIGNIFICANT CHANGE UP (ref 0–0.9)
MONOCYTES NFR BLD AUTO: 11.6 % — SIGNIFICANT CHANGE UP (ref 2–14)
NEUTROPHILS # BLD AUTO: 1.56 K/UL — LOW (ref 1.8–7.4)
NEUTROPHILS NFR BLD AUTO: 56.2 % — SIGNIFICANT CHANGE UP (ref 43–77)
PLAT MORPH BLD: NORMAL — SIGNIFICANT CHANGE UP
PLATELET # BLD AUTO: 151 K/UL — SIGNIFICANT CHANGE UP (ref 150–400)
POTASSIUM SERPL-MCNC: 3.6 MMOL/L — SIGNIFICANT CHANGE UP (ref 3.5–5.3)
POTASSIUM SERPL-SCNC: 3.6 MMOL/L — SIGNIFICANT CHANGE UP (ref 3.5–5.3)
PROT SERPL-MCNC: 5.7 G/DL — LOW (ref 6–8.3)
RBC # BLD: 3.23 M/UL — LOW (ref 3.8–5.2)
RBC # FLD: 17.6 % — HIGH (ref 10.3–14.5)
RBC BLD AUTO: SIGNIFICANT CHANGE UP
SODIUM SERPL-SCNC: 136 MMOL/L — SIGNIFICANT CHANGE UP (ref 135–145)
SPECIMEN SOURCE: SIGNIFICANT CHANGE UP
VARIANT LYMPHS # BLD: 3.6 % — SIGNIFICANT CHANGE UP (ref 0–6)
WBC # BLD: 2.78 K/UL — LOW (ref 3.8–10.5)
WBC # FLD AUTO: 2.78 K/UL — LOW (ref 3.8–10.5)

## 2021-10-08 PROCEDURE — 93970 EXTREMITY STUDY: CPT | Mod: 26

## 2021-10-08 RX ORDER — FUROSEMIDE 40 MG
40 TABLET ORAL DAILY
Refills: 0 | Status: DISCONTINUED | OUTPATIENT
Start: 2021-10-09 | End: 2021-10-09

## 2021-10-08 RX ORDER — POTASSIUM CHLORIDE 20 MEQ
40 PACKET (EA) ORAL ONCE
Refills: 0 | Status: COMPLETED | OUTPATIENT
Start: 2021-10-08 | End: 2021-10-08

## 2021-10-08 RX ADMIN — VALACYCLOVIR 500 MILLIGRAM(S): 500 TABLET, FILM COATED ORAL at 22:19

## 2021-10-08 RX ADMIN — SENNA PLUS 2 TABLET(S): 8.6 TABLET ORAL at 22:21

## 2021-10-08 RX ADMIN — POLYETHYLENE GLYCOL 3350 17 GRAM(S): 17 POWDER, FOR SOLUTION ORAL at 05:59

## 2021-10-08 RX ADMIN — Medication 2 MILLIGRAM(S): at 06:02

## 2021-10-08 RX ADMIN — Medication 3 MILLILITER(S): at 20:09

## 2021-10-08 RX ADMIN — Medication 40 MILLIEQUIVALENT(S): at 12:49

## 2021-10-08 RX ADMIN — PERPHENAZINE 2 MILLIGRAM(S): 8 TABLET, FILM COATED ORAL at 22:20

## 2021-10-08 RX ADMIN — HEPARIN SODIUM 5000 UNIT(S): 5000 INJECTION INTRAVENOUS; SUBCUTANEOUS at 14:55

## 2021-10-08 RX ADMIN — Medication 500 MILLIGRAM(S): at 12:49

## 2021-10-08 RX ADMIN — LIDOCAINE 1 PATCH: 4 CREAM TOPICAL at 22:24

## 2021-10-08 RX ADMIN — Medication 1 TABLET(S): at 12:49

## 2021-10-08 RX ADMIN — HEPARIN SODIUM 5000 UNIT(S): 5000 INJECTION INTRAVENOUS; SUBCUTANEOUS at 05:59

## 2021-10-08 RX ADMIN — POLYETHYLENE GLYCOL 3350 17 GRAM(S): 17 POWDER, FOR SOLUTION ORAL at 18:04

## 2021-10-08 RX ADMIN — Medication 40 MILLIGRAM(S): at 06:01

## 2021-10-08 RX ADMIN — HEPARIN SODIUM 5000 UNIT(S): 5000 INJECTION INTRAVENOUS; SUBCUTANEOUS at 22:21

## 2021-10-08 RX ADMIN — Medication 2 MILLIGRAM(S): at 18:03

## 2021-10-08 RX ADMIN — LIDOCAINE 1 PATCH: 4 CREAM TOPICAL at 06:00

## 2021-10-08 RX ADMIN — Medication 10 MILLIGRAM(S): at 22:20

## 2021-10-08 RX ADMIN — Medication 3 MILLILITER(S): at 06:09

## 2021-10-08 RX ADMIN — VALACYCLOVIR 500 MILLIGRAM(S): 500 TABLET, FILM COATED ORAL at 05:58

## 2021-10-08 NOTE — PROGRESS NOTE ADULT - ASSESSMENT
Patient is a 80 PMHx of new onset CLL, with possible staging of ovarian cancer, HTN, HLD, CC s/p fall and fevers.  Per daughter, pt lives at home alone, fell two times while getting out of bed. States she fell onto both hands and knees while rolling out of bed. No LOC.  Denied any lightheadedness, dizziness at the time of fall.  Currently states she has some dizziness, like she would fall over if she had to walk. Was brought in today due to having a fever. PT complaining of SOB as well, and has pain in both knees. Denies cough, headache, chest pain, nausea, vomiting. Has not started any chemotherapy per daughter    # AMS and dizziness   CT head noted  fall precautions  check orthostatics  Neurology eval appreciated follow up recs    # Sepsis  R/O sepsis   UTI vs tumor fever vs other etiologies   Pan cx   cont Rocephin for now  monitor clinically   ID eval appreciated   Hydration as tolerated  Pan CT noted      # HARJIT   HARJIT resolved  IV hydration discontinued due to episode of wheezing  Monitor renal function  I and Os   As per nephrology, monitor renal function and avoid nephrotoxic     # Ovarian Ca  with mets  seen on CT   onc follow up   Onc eval called, patient follows with MONTR, will call     # HTN   resume BP meds   adjust as tolerated     # HLD   lipid panel     #Wheezing, fluid overload likely   Cont Duonebs  D/C IVF  continue with lasix 40 mg IVP  and monitor for improvement   BNP results noted. Will follow  Echo ordered, awaiting the results  Card eval appreciated     DVT and GI PPX    Patient is a 80 PMHx of new onset CLL, with possible staging of ovarian cancer, HTN, HLD, CC s/p fall and fevers.  Per daughter, pt lives at home alone, fell two times while getting out of bed. States she fell onto both hands and knees while rolling out of bed. No LOC.  Denied any lightheadedness, dizziness at the time of fall.  Currently states she has some dizziness, like she would fall over if she had to walk. Was brought in today due to having a fever. PT complaining of SOB as well, and has pain in both knees. Denies cough, headache, chest pain, nausea, vomiting. Has not started any chemotherapy per daughter    # AMS and dizziness   CT head noted  fall precautions  check orthostatics  Neurology eval appreciated follow up recs    # Sepsis  R/O sepsis   UTI vs tumor fever vs other etiologies   Pan cx   cont Rocephin for now  monitor clinically   ID eval appreciated   Hydration as tolerated  Pan CT noted      # HARJIT   HARJIT resolved  IV hydration discontinued due to episode of wheezing  Monitor renal function  I and Os   As per nephrology, monitor renal function and avoid nephrotoxic     # Ovarian Ca  with mets  seen on CT   onc follow up   Onc eval called, patient follows with MONTR, will call     # HTN   resume BP meds   adjust as tolerated     # HLD   lipid panel     #Wheezing, fluid overload likely   Cont Duonebs  D/C IVF  will adjust diuresis  BNP results noted. Will follow  Echo ordered, awaiting the results  Card eval appreciated     DVT and GI PPX

## 2021-10-08 NOTE — PROGRESS NOTE ADULT - SUBJECTIVE AND OBJECTIVE BOX
DATE OF SERVICE: 10-08-21    Patient is a 80y old  Female who presents with a chief complaint of AMS (08 Oct 2021 11:28)      INTERVAL HISTORY: feels ok         PHYSICAL EXAM:  T(C): 36.6 (10-08-21 @ 17:07), Max: 37 (10-08-21 @ 09:35)  HR: 100 (10-08-21 @ 17:07) (80 - 109)  BP: 131/77 (10-08-21 @ 17:07) (101/52 - 150/79)  RR: 18 (10-08-21 @ 17:07) (17 - 18)  SpO2: 95% (10-08-21 @ 17:07) (93% - 100%)  Wt(kg): --  I&O's Summary    07 Oct 2021 07:01  -  08 Oct 2021 07:00  --------------------------------------------------------  IN: 610 mL / OUT: 900 mL / NET: -290 mL    08 Oct 2021 07:01  -  08 Oct 2021 17:20  --------------------------------------------------------  IN: 390 mL / OUT: 501 mL / NET: -111 mL          Appearance: In no distress	  HEENT:    PERRL, EOMI	  Cardiovascular:  S1 S2, No JVD  Respiratory: Lungs clear to auscultation	  Gastrointestinal:  Soft, Non-tender, + BS	  Vascularature:  No edema of LE  Psychiatric: Appropriate affect   Neuro: no acute focal deficits                               10.4   2.78  )-----------( 151      ( 08 Oct 2021 07:00 )             32.5     10-08    136  |  99  |  19  ----------------------------<  93  3.6   |  25  |  1.31<H>    Ca    10.2      08 Oct 2021 06:59  Mg     2.0     10-08    TPro  5.7<L>  /  Alb  2.7<L>  /  TBili  0.3  /  DBili  x   /  AST  95<H>  /  ALT  35  /  AlkPhos  126<H>  10-08        Labs personally reviewed      EKG: Personally reviewed by me - Sinus arrythmia   Radiology: Personally reviewed by me -   chest xray mild pulm edema         Assessment and Plan:   · Assessment	  Patient is a 80 PMHx of new onset CLL, with possible staging of ovarian cancer, HTN, HLD, CC s/p fall and fevers.  Per daughter, pt lives at home alone, fell two times while getting out of bed. States she fell onto both hands and knees while rolling out of bed. No LOC.  Denied any lightheadedness, dizziness at the time of fall.  Currently states she has some dizziness, like she would fall over if she had to walk. Was brought in today due to having a fever. PT complaining of SOB as well, and has pain in both knees. Denies cough, headache, chest pain, nausea, vomiting. Has not started any chemotherapy per daughter    # AMS and dizziness   check orthostatics  Neurology eval appreciated follow up recs  - unlikely cardiac     # SOB  d/pablito IVF as CXR with mild pulm vasc congestion  - Recurrent SOB again this am  - Lasix 40mg IV daily, close monitor Cr given recent HARJIT    # HARJIT   HARJIT resolved    # HTN   resume BP meds   adjust as tolerated     # HLD   lipid panel           Dallas Monaco DO [facility]  Cardiovascular Medicine  [address], [address]  Office: [phone]  Cell: [phone]

## 2021-10-08 NOTE — PROGRESS NOTE ADULT - SUBJECTIVE AND OBJECTIVE BOX
CC: f/u for fever    Patient reports no complaints     REVIEW OF SYSTEMS:  All other review of systems negative (Comprehensive ROS)    Antimicrobials Day #  : 5/5  cefTRIAXone   IVPB 1000 milliGRAM(s) IV Intermittent every 24 hours  valACYclovir 500 milliGRAM(s) Oral two times a day    Other Medications Reviewed    T(F): 98.5 (10-08-21 @ 05:18), Max: 98.5 (10-08-21 @ 05:18)  HR: 80 (10-08-21 @ 05:18)  BP: 127/77 (10-08-21 @ 05:18)  RR: 18 (10-08-21 @ 05:18)  SpO2: 95% (10-08-21 @ 05:18)  Wt(kg): --    PHYSICAL EXAM:  General: alert, no acute distress  Eyes:  anicteric, no conjunctival injection, no discharge  Neck: supple  Lungs: clear to auscultation  Heart: regular rate and rhythm; no murmurs  Abdomen: soft, nondistended, nontender, without mass or organomegaly  Skin: no lesions  Extremities: no clubbing, cyanosis, or edema  Neurologic: alert, oriented, moves all extremities    LAB RESULTS:                        10.4   2.78  )-----------( 151      ( 08 Oct 2021 07:00 )             32.5     10-08    136  |  99  |  19  ----------------------------<  93  3.6   |  25  |  1.31<H>    Ca    10.2      08 Oct 2021 06:59  Mg     2.0     10-08    TPro  5.7<L>  /  Alb  2.7<L>  /  TBili  0.3  /  DBili  x   /  AST  95<H>  /  ALT  35  /  AlkPhos  126<H>  10-08    LIVER FUNCTIONS - ( 08 Oct 2021 06:59 )  Alb: 2.7 g/dL / Pro: 5.7 g/dL / ALK PHOS: 126 U/L / ALT: 35 U/L / AST: 95 U/L / GGT: x             MICROBIOLOGY:  RECENT CULTURES:  10-05 @ 21:26 Clean Catch Clean Catch (Midstream)     <10,000 CFU/mL Normal Urogenital Kaylyn      10-05 @ 14:55 .Blood-Peripheral     No growth to date.      10-04 @ 13:33 Clean Catch Clean Catch (Midstream) Klebsiella pneumoniae    50,000 - 99,000 CFU/mL Klebsiella pneumoniae      10-04 @ 11:16 .Blood Blood-Peripheral     No growth to date.          RADIOLOGY REVIEWED:

## 2021-10-08 NOTE — PROGRESS NOTE ADULT - ASSESSMENT
81yo F with  new onset CLL, with possible staging of ovarian cancer, HTN, HLD, RA, anxiety, s/p fall and fevers.   AMS likely 2/2 infection.  dizziness better + UTI  CTH and CT c spien unremarkable   CT C A P  LDL 44, A1c 5.2  - ceftriaxone for infection /UTI now off.   - heme/onc for CLL  - HARJIT, IVF  - MRI brain w/ and w/o if in agreement with GOC  - check orthostatics   - check b12   - PT/OT/SS/SLP, OOBC  - check FS, glucose control <180  - GI/DVT ppx  - Counseling on diet, exercise, and medication adherence was done  - Counseling on smoking cessation and alcohol consumption offered when appropriate.  - Pain assessed and judicious use of narcotics when appropriate was discussed.    - Stroke education given when appropriate.  - Importance of fall prevention discussed.   - Differential diagnosis and plan of care discussed with patient and/or family and primary team  - Thank you for allowing me to participate in the care of this patient. Call with questions.   Sherman Thompson MD  Vascular Neurology  Office: [phone]  81yo F with  new onset CLL, with possible staging of ovarian cancer, HTN, HLD, RA, anxiety, s/p fall and fevers.   AMS likely 2/2 infection.  dizziness better + UTI  CTH and CT c spien unremarkable   CT C A P  LDL 44, A1c 5.2  b12 WNL  - ceftriaxone for infection /UTI now off.   - heme/onc for CLL. recs appreciated   - HARJIT, IVF  - MRI brain w/ and w/o if in agreement with GOC  - check orthostatics   - PT/OT/SS/SLP, OOBC  - check FS, glucose control <180  - GI/DVT ppx  - Counseling on diet, exercise, and medication adherence was done  - Counseling on smoking cessation and alcohol consumption offered when appropriate.  - Pain assessed and judicious use of narcotics when appropriate was discussed.    - Stroke education given when appropriate.  - Importance of fall prevention discussed.   - Differential diagnosis and plan of care discussed with patient and/or family and primary team  - Thank you for allowing me to participate in the care of this patient. Call with questions.   - d/c planning. family doesn't want RA Thompson MD  Vascular Neurology  Office: [phone]

## 2021-10-08 NOTE — PROGRESS NOTE ADULT - ASSESSMENT
Patient is a 80 PMHx of new onset CLL, with possible staging of ovarian cancer, HTN, HLD, CC s/p fall and fevers.  Per daughter, pt lives at home alone, fell two times while getting out of bed. States she fell onto both hands and knees while rolling out of bed. No LOC.  Denied any lightheadedness, dizziness at the time of fall.  Was brought in today due to having a fever. PT complaining of SOB as well, and has pain in both knees. Denies cough, headache, chest pain, nausea, vomiting. Has not started any chemotherapy per daughter. Has renal failure, denied h/o renal failure      A/P:  HARJIT  Baseline Scr unclear  HARJIT possibly sec to contrast s/p 10/4   Renal function ifluctuating  Monitor renal function at present  Avoid nephrotoxics, NSAIds RCA    Hyponatremia  Initial Urine lytes suggestive of dehydration   Repeat Urine lytes suggestive of SIADH  Sodium improving today  Monitor Na level daily                     Continue  antibiotics in  NS instead of D5     HTN:  not on antihypertensive meds  monitor at present       Proteinuria/Hematuria  in setting of UTI  Repeat UA post tx   Ct A/P with no renal mass

## 2021-10-08 NOTE — PROGRESS NOTE ADULT - SUBJECTIVE AND OBJECTIVE BOX
Neurology Progress Note    S: Patient seen and examined. resting in bed. doing okay. family wants home     Medication:  MEDICATIONS  (STANDING):  amitriptyline 10 milliGRAM(s) Oral at bedtime  ascorbic acid 500 milliGRAM(s) Oral daily  heparin   Injectable 5000 Unit(s) SubCutaneous every 8 hours  influenza   Vaccine 0.5 milliLiter(s) IntraMuscular once  lidocaine   4% Patch 1 Patch Transdermal every 24 hours  LORazepam     Tablet 2 milliGRAM(s) Oral two times a day  multivitamin 1 Tablet(s) Oral daily  perphenazine 2 milliGRAM(s) Oral at bedtime  polyethylene glycol 3350 17 Gram(s) Oral two times a day  potassium chloride    Tablet ER 40 milliEquivalent(s) Oral once  senna 2 Tablet(s) Oral at bedtime  valACYclovir 500 milliGRAM(s) Oral two times a day    MEDICATIONS  (PRN):  acetaminophen   Tablet .. 650 milliGRAM(s) Oral every 6 hours PRN Temp greater or equal to 38C (100.4F)  acetaminophen   Tablet .. 650 milliGRAM(s) Oral every 6 hours PRN Mild Pain (1 - 3), Moderate Pain (4 - 6)  albuterol/ipratropium for Nebulization 3 milliLiter(s) Nebulizer every 6 hours PRN Shortness of Breath and/or Wheezing        Vitals:  Vital Signs Last 24 Hrs  T(C): 37 (10-08-21 @ 09:35), Max: 37 (10-08-21 @ 09:35)  T(F): 98.6 (10-08-21 @ 09:35), Max: 98.6 (10-08-21 @ 09:35)  HR: 106 (10-08-21 @ 09:35) (72 - 106)  BP: 101/52 (10-08-21 @ 09:35) (101/52 - 150/79)  BP(mean): --  RR: 17 (10-08-21 @ 09:35) (17 - 18)  SpO2: 94% (10-08-21 @ 09:35) (94% - 100%)          General Exam:   General Appearance: Appropriately dressed and in no acute distress       Head: Normocephalic, atraumatic and no dysmorphic features  Ear, Nose, and Throat: Moist mucous membranes  CVS: S1S2+  Resp: No SOB, no wheeze or rhonchi  GI: soft NT/ND  Extremities: No edema or cyanosis  Skin: No bruises or rashes     Neurological Exam:  Mental Status: Awake, alert and oriented x 2.  Able to follow simple and complex verbal commands. Able to name and repeat. fluent speech. No obvious aphasia or dysarthria noted. masked facies. + frontal release sigbns   Cranial Nerves: PERRL, EOMI, VFFC, sensation V1-V3 intact,  L facial asymmetry, equal elevation of palate, scm/trap 5/5, tongue is midline on protrusion. no obvious papilledema on fundoscopic exam. hearing is grossly intact.   Motor:HASTINGS but + tremor L>R moves Uppers>lowers   Sensation: Intact to light touch and pinprick throughout. no right/left confusion. no extinction to tactile on DSS.   Reflexes: 1+ throughout at biceps, brachioradialis, triceps, patellars and ankles bilaterally and equal. No clonus. R toe and L toe were both downgoing.  Coordination: no dysmetria FNF  Gait: deferred   I personally reviewed the below data/images/labs:      CBC Full  -  ( 08 Oct 2021 07:00 )  WBC Count : 2.78 K/uL  RBC Count : 3.23 M/uL  Hemoglobin : 10.4 g/dL  Hematocrit : 32.5 %  Platelet Count - Automated : 151 K/uL  Mean Cell Volume : 100.6 fl  Mean Cell Hemoglobin : 32.2 pg  Mean Cell Hemoglobin Concentration : 32.0 gm/dL  Auto Neutrophil # : 1.56 K/uL  Auto Lymphocyte # : 0.42 K/uL  Auto Monocyte # : 0.32 K/uL  Auto Eosinophil # : 0.30 K/uL  Auto Basophil # : 0.08 K/uL  Auto Neutrophil % : 56.2 %  Auto Lymphocyte % : 15.2 %  Auto Monocyte % : 11.6 %  Auto Eosinophil % : 10.7 %  Auto Basophil % : 2.7 %    10-08    136  |  99  |  19  ----------------------------<  93  3.6   |  25  |  1.31<H>    Ca    10.2      08 Oct 2021 06:59  Mg     2.0     10    TPro  5.7<L>  /  Alb  2.7<L>  /  TBili  0.3  /  DBili  x   /  AST  95<H>  /  ALT  35  /  AlkPhos  126<H>  1008      Urinalysis Basic - ( 05 Oct 2021 17:05 )    Color: Yellow / Appearance: Clear / S.033 / pH: x  Gluc: x / Ketone: Negative  / Bili: Negative / Urobili: Negative   Blood: x / Protein: 100 / Nitrite: Negative   Leuk Esterase: Negative / RBC: 2 /hpf / WBC 3 /HPF   Sq Epi: x / Non Sq Epi: 2 /hpf / Bacteria: Negative      < from: CT Head No Cont (10.04.21 @ 09:07) >      EXAM:  CT CERVICAL SPINE                          EXAM:  CT BRAIN                            PROCEDURE DATE:  10/04/2021            INTERPRETATION:  CLINICAL STATEMENT: Trauma..    TECHNIQUE: CT of the head and cervical spine were performed withoutIV contrast. 3-D/MIP images obtained    COMPARISON: None.    FINDINGS:  Head:  There is mild diffuse parenchymal volume loss. There are areas of low attenuation in the periventricular white matter likely related to mild chronic microvascular ischemicchanges.    There is no acute intracranial hemorrhage, parenchymal mass, mass effect or midline shift. There is no acute territorial infarct. There is no hydrocephalus. Partial empty sella    The cranium is intact.    Mild mucosal thickening right maxillary sinus    Cervical spine:  Vertebral body heights intact. Straightening of the cervical lordosis. Grade 1 retrolisthesis C3 on C4. Grade 1 anterolisthesis of C5 on C6    There is no prevertebral soft tissue swelling. The odontoid is intact.    Evaluation of the spinal canal is limited on a CT exam.  Multilevel degenerative changes noted resulting in multilevel spinal canal stenosis and neural foraminal narrowing.    Partially visualized cervical lymphadenopathy noted with multiple enlarged lymph nodes. Calcifications noted within the thyroid gland.    IMPRESSION:  No acute intracranial hemorrhage.    No acute fracture cervical spine. If pain persists, follow-up MRI exam recommended    Partially visualized cervical lymphadenopathy suspicious for lymphoma or metastatic disease. Correlate clinically    --- End of Report ---                ORBI RUTH MD; Attending Radiologist  This document has been electronically signed. Oct  4 2021  9:22AM    < end of copied text >      < from: CT Chest No Cont (10.04.21 @ 20:17) >    EXAM:  CT CHEST                            PROCEDURE DATE:  10/04/2021            INTERPRETATION:  CLINICAL INFORMATION: Shortness of breath, evaluate for pneumonia    COMPARISON: CT abdomen and pelvis 10/4/2021    CONTRAST/COMPLICATIONS:  IV Contrast: None  Oral Contrast: None  Complications: No immediate complication    PROCEDURE:  CT of the Chest was performed.  Sagittal and coronal reformats were performed.    FINDINGS:    LUNGS AND AIRWAYS: Patent central airways.  Calcified nodule abutting the left anterior pleura (series 4, image 57). 3 mm nodule in the left upper lobe (series 4, image 39). Additional 2 mm nodule in the left upper lobe (series 4, image 58).  PLEURA: No pleural effusion.  MEDIASTINUM AND ERICA: Multiple enlarged supraclavicular lymph nodes. Additional axillary and mediastinal lymphadenopathy is seen.  VESSELS: Within normal limits.  HEART: Heart size is normal. No pericardial effusion.  CHEST WALL AND LOWER NECK: Calcifications within the bilateral thyroid lobes.  VISUALIZED UPPER ABDOMEN: Please refer to prior CT abdomen/pelvis.  BONES: Degenerative changes of the spine.    IMPRESSION:  Lymphadenoapthy described above is consistent with CLL as per patient history.    No CT evidence for pneumonia.                --- End of Report ---              KADEEM YE MD; Resident Radiology  This document has been electronically signed.  REYMUNDO VARGAS MD; Attending Radiologist  This document has been electronically signed. Oct  5 2021 12:09PM    < end of copied text >  < from: CT Abdomen and Pelvis w/ IV Cont (10.04.21 @ 09:30) >    EXAM:  CT ABDOMEN AND PELVIS IC                            PROCEDURE DATE:  10/04/2021            INTERPRETATION:  CLINICAL INFORMATION: Left lower quadrant pain    COMPARISON: None.    CONTRAST/COMPLICATIONS:  IV Contrast: Omnipaque 350  90 cc administered   10 cc discarded  Oral Contrast: None  Complications: None    PROCEDURE:  CT of the Abdomen and Pelvis was performed.  Sagittal and coronal reformats were performed.    FINDINGS:  LOWER CHEST: Bibasilar atelectasis. Partially imaged enlarged right axillary lymph nodes.    LIVER: Within normal limits.  BILE DUCTS: Normal caliber.  GALLBLADDER: Within normal limits.  SPLEEN: Within normal limits.  PANCREAS: Fatty atrophy of the pancreas without cutoff. No ductal dilatation or cutoff.  ADRENALS: Within normal limits.  KIDNEYS/URETERS: Within normal limits.    BLADDER/REPRODUCTIVE ORGANS: Multiloculated right adnexal cystic mass measuring 14.6 x 9.0 cm, which abuts the appendix. Left adnexal cyst measures 2.1 x 1.4 cm. Uterus is unremarkable.    BOWEL: No bowel obstruction. Appendix is normal. Colonic diverticulosis.  PERITONEUM: 2 small nodules are present adjacent to the cystic lesion measuring 4 and 6 mm in diameter, respectively (2:75 and 79), possibly peritoneal nodules or small pericolonic lymph nodes.  VESSELS: Within normal limits.  RETROPERITONEUM/LYMPH NODES: Enlarged bilateral inguinal, iliac, mesenteric, and retroperitoneal lymph nodes.  *  Reference left inguinal lymph node measures up to 4.0 x 2.5 cm (2:93).  *  Reference left periaortic lymph node measures up to 2.5 x 2.3 cm (2:45).  ABDOMINAL WALL: Within normal limits.  BONES: Degenerative changes. Lumbar dextroscoliosis.    IMPRESSION:  Enlarged bilateral inguinal, iliac, mesenteric, and retroperitoneal lymph nodes. Partially imaged enlarged right axillary lymph nodes. Primary consideration is lymphoma. Metastatic disease is not excluded. Dedicated chest imaging is recommended for full evaluation.    Cystic right adnexal mass measuring 14.6 x 9.0 cm, concerning for epithelial neoplasm.      --- End of Report ---      WALTER ALANIS MD; Resident Interventional Radiology  This document has been electronically signed.  DAREN MOHAN MD; Attending Radiologist  This document has been electronically signed. Oct  4 2021 11:14AM    < end of copied text >

## 2021-10-08 NOTE — PROGRESS NOTE ADULT - SUBJECTIVE AND OBJECTIVE BOX
[facility] [place] [facility]   MD LAN RIVERO MD RUORU WONG, PA    TEL:  OFFICE: [phone]  DR VILLASENOR CELL: [phone]  HERMELINDA COLEMAN CELL: [phone]  DR. CAMARA CELL: [phone]      FROM 5 PM - 7 AM PLEASE CALL ANSWERING SERVICE: [phone]    RENAL FOLLOW UP NOTE--Date of Service 10-08-21 @ 08:42  --------------------------------------------------------------------------------  HPI:      Pt seen and examined at bedside.       PAST HISTORY  --------------------------------------------------------------------------------  No significant changes to PMH, PSH, FHx, SHx, unless otherwise noted    ALLERGIES & MEDICATIONS  --------------------------------------------------------------------------------  Allergies    penicillin (Unknown)    Intolerances    Biaxin (Other)    Standing Inpatient Medications  amitriptyline 10 milliGRAM(s) Oral at bedtime  ascorbic acid 500 milliGRAM(s) Oral daily  cefTRIAXone   IVPB 1000 milliGRAM(s) IV Intermittent every 24 hours  furosemide   Injectable 40 milliGRAM(s) IV Push daily  heparin   Injectable 5000 Unit(s) SubCutaneous every 8 hours  influenza   Vaccine 0.5 milliLiter(s) IntraMuscular once  lidocaine   4% Patch 1 Patch Transdermal every 24 hours  LORazepam     Tablet 2 milliGRAM(s) Oral two times a day  multivitamin 1 Tablet(s) Oral daily  perphenazine 2 milliGRAM(s) Oral at bedtime  polyethylene glycol 3350 17 Gram(s) Oral two times a day  senna 2 Tablet(s) Oral at bedtime  valACYclovir 500 milliGRAM(s) Oral two times a day    PRN Inpatient Medications  acetaminophen   Tablet .. 650 milliGRAM(s) Oral every 6 hours PRN  acetaminophen   Tablet .. 650 milliGRAM(s) Oral every 6 hours PRN  albuterol/ipratropium for Nebulization 3 milliLiter(s) Nebulizer every 6 hours PRN      REVIEW OF SYSTEMS  --------------------------------------------------------------------------------  General: no fever    MSK: no edema     VITALS/PHYSICAL EXAM  --------------------------------------------------------------------------------  T(C): 36.9 (10-08-21 @ 05:18), Max: 36.9 (10-08-21 @ 05:18)  HR: 80 (10-08-21 @ 05:18) (72 - 92)  BP: 127/77 (10-08-21 @ 05:18) (121/71 - 150/79)  RR: 18 (10-08-21 @ 05:18) (18 - 18)  SpO2: 95% (10-08-21 @ 05:18) (95% - 100%)  Wt(kg): --        10-07-21 @ 07:01  -  10-08-21 @ 07:00  --------------------------------------------------------  IN: 610 mL / OUT: 900 mL / NET: -290 mL      Physical Exam:  	Gen: NAD  	HEENT: MMM  	Pulm: CTA B/L  	CV: S1S2  	Abd: Soft, +BS  	Ext: No LE edema B/L                      Neuro: Awake   	Skin: Warm and Dry   	Vascular access: NO HD catheter            no majano  LABS/STUDIES  --------------------------------------------------------------------------------              10.4   2.78  >-----------<  151      [10-08-21 @ 07:00]              32.5     136  |  99  |  19  ----------------------------<  93      [10-08-21 @ 06:59]  3.6   |  25  |  1.31        Ca     10.2     [10-08-21 @ 06:59]      Mg     2.0     [10-08-21 @ 06:59]    TPro  5.7  /  Alb  2.7  /  TBili  0.3  /  DBili  x   /  AST  95  /  ALT  35  /  AlkPhos  126  [10-08-21 @ 06:59]          Creatinine Trend:  SCr 1.31 [10-08 @ 06:59]  SCr 1.20 [10-07 @ 07:17]  SCr 1.28 [10-06 @ 07:07]  SCr 1.52 [10-05 @ 07:40]  SCr 1.43 [10-04 @ 08:21]    Urinalysis - [10-05-21 @ 17:05]      Color Yellow / Appearance Clear / SG 1.033 / pH 6.0      Gluc Negative / Ketone Negative  / Bili Negative / Urobili Negative       Blood Moderate / Protein 100 / Leuk Est Negative / Nitrite Negative      RBC 2 / WBC 3 / Hyaline 1 / Gran  / Sq Epi  / Non Sq Epi 2 / Bacteria Negative    Urine Creatinine 77      [10-05-21 @ 17:05]  Urine Sodium 60      [10-07-21 @ 12:53]  Urine Osmolality 474      [10-07-21 @ 12:53]    TSH 1.15      [10-05-21 @ 16:30]  Lipid: chol 87, , HDL 15, LDL --      [10-05-21 @ 16:30]

## 2021-10-08 NOTE — PROGRESS NOTE ADULT - ASSESSMENT
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   She was hospitalized at [facility] a few months ago for a cardiac evaluation, which was negative, per daughter    Admitted to [facility] with fever and weakness associated with 2 falls on 10/03/21.  She developed fever on 10/03 with accelerated weakness and brought to ER for evaluation.   Fever to 101.4 in the ER prompted CTX.  Etiology of fever not clear although "B" symptoms of lymphoma possible but her decline in functional status seemed acute.   Reported abdominal pain and bloating but CT scan without acute pathology.   UA with 14 WBCs, urine cx with Klebsiella - doubt UTI   Blood cx NGTD   On empiric Ceftriaxone  CT chest without PNA  WNV serology neg  Quant TB test neg  Repeat RVP still neg   Repeated urine cx & blood cx NGTD  Last fever on 10/05/21   Afebrile now, but weak & deconditioned, interaction limited  Seen by derm on 10/07 for new onset of clean based bilateral buttock & lower sacral area ulcers - started on Valtrex & they have been swabbed for HSV & CMV by derm    Suggest:  Follow blood & urine culture - until final neg  Stop empiric Ceftriaxone now   Pt will need to follow up with her primary oncologist to review further mgmt plans at [facility]   80y female with a PMH of HTN, HLD, RA, anxiety & 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   She was hospitalized at [facility] a few months ago for a cardiac evaluation, which was negative, per daughter    Admitted to [facility] with fever and weakness associated with 2 falls on 10/03/21.  She developed fever on 10/03 with accelerated weakness and brought to ER for evaluation.   Fever to 101.4 in the ER prompted CTX.  Etiology of fever not clear although "B" symptoms of lymphoma possible but her decline in functional status seemed acute.   Reported abdominal pain and bloating but CT scan without acute pathology.   UA with 14 WBCs, urine cx with Klebsiella - doubt UTI   Blood cx NGTD   On empiric Ceftriaxone  CT chest without PNA  WNV serology neg  Quant TB test neg  Repeat RVP still neg   Repeated urine cx & blood cx NGTD  Last fever on 10/05/21   Afebrile now, but weak & deconditioned, interaction limited  Seen by derm on 10/07 for new onset of clean based bilateral buttock & lower sacral area ulcers - started on Valtrex & they have been swabbed for HSV & CMV by derm    Suggest:  Follow blood & urine culture - until final neg  Stop empiric Ceftriaxone now   Pt will need to follow up with her primary oncologist to review further mgmt plans at [facility]

## 2021-10-08 NOTE — PROGRESS NOTE ADULT - SUBJECTIVE AND OBJECTIVE BOX
Name of Patient : PREM KAY  MRN: [id number]  Date of visit: 10-08-21 @ 10:21      Subjective: Patient seen and examined. No new events except as noted.     REVIEW OF SYSTEMS:    CONSTITUTIONAL: + Weakness  EYES/ENT: No visual changes;  No vertigo or throat pain   NECK: No pain or stiffness  RESPIRATORY: No cough, wheezing, hemoptysis; No shortness of breath  CARDIOVASCULAR: No chest pain or palpitations  GASTROINTESTINAL: + abdominal pain   GENITOURINARY: No dysuria, frequency or hematuria  NEUROLOGICAL: No numbness or weakness  SKIN: No itching, burning, rashes, or lesions   All other review of systems is negative unless indicated above.    MEDICATIONS:  MEDICATIONS  (STANDING):  amitriptyline 10 milliGRAM(s) Oral at bedtime  ascorbic acid 500 milliGRAM(s) Oral daily  cefTRIAXone   IVPB 1000 milliGRAM(s) IV Intermittent every 24 hours  furosemide   Injectable 40 milliGRAM(s) IV Push daily  heparin   Injectable 5000 Unit(s) SubCutaneous every 8 hours  influenza   Vaccine 0.5 milliLiter(s) IntraMuscular once  lidocaine   4% Patch 1 Patch Transdermal every 24 hours  LORazepam     Tablet 2 milliGRAM(s) Oral two times a day  multivitamin 1 Tablet(s) Oral daily  perphenazine 2 milliGRAM(s) Oral at bedtime  polyethylene glycol 3350 17 Gram(s) Oral two times a day  senna 2 Tablet(s) Oral at bedtime  valACYclovir 500 milliGRAM(s) Oral two times a day      PHYSICAL EXAM:  T(C): 36.9 (10-08-21 @ 05:18), Max: 36.9 (10-08-21 @ 05:18)  HR: 80 (10-08-21 @ 05:18) (72 - 92)  BP: 127/77 (10-08-21 @ 05:18) (121/71 - 150/79)  RR: 18 (10-08-21 @ 05:18) (18 - 18)  SpO2: 95% (10-08-21 @ 05:18) (95% - 100%)  Wt(kg): --  I&O's Summary    07 Oct 2021 07:01  -  08 Oct 2021 07:00  --------------------------------------------------------  IN: 610 mL / OUT: 900 mL / NET: -290 mL          Appearance: Normal	  HEENT:  PERRLA   Lymphatic: No lymphadenopathy   Cardiovascular: Normal S1 S2, no JVD  Respiratory: normal effort , clear  Gastrointestinal:  soft, tender on palpation   Skin: No rashes,  warm to touch  Psychiatry:  Mood & affect appropriate  Musculuskeletal: No edema      All labs, Imaging and EKGs personally reviewed                             10.4   2.78  )-----------( 151      ( 08 Oct 2021 07:00 )             32.5               10-08    136  |  99  |  19  ----------------------------<  93  3.6   |  25  |  1.31<H>    Ca    10.2      08 Oct 2021 06:59  Mg     2.0     10-08    TPro  5.7<L>  /  Alb  2.7<L>  /  TBili  0.3  /  DBili  x   /  AST  95<H>  /  ALT  35  /  AlkPhos  126<H>  10-08               10-07-21 @ 07:01  -  10-08-21 @ 07:00  --------------------------------------------------------  IN: 610 mL / OUT: 900 mL / NET: -290 mL

## 2021-10-08 NOTE — CHART NOTE - NSCHARTNOTEFT_GEN_A_CORE
As outpatient patient was prescribed ibrutinib for treatment of CLL/SLL.     Given current infection, will hold off on starting ibrutinib in the hospital.  After antibiotics completed and infection cleared patient can follow up with Dr. Raven Ayoub at [facility] to discuss starting ibrutinib.

## 2021-10-09 LAB
-  AMPICILLIN: SIGNIFICANT CHANGE UP
-  TETRACYCLINE: SIGNIFICANT CHANGE UP
-  VANCOMYCIN: SIGNIFICANT CHANGE UP
ALBUMIN SERPL ELPH-MCNC: 3 G/DL — LOW (ref 3.3–5)
ALP SERPL-CCNC: 144 U/L — HIGH (ref 40–120)
ALT FLD-CCNC: 36 U/L — SIGNIFICANT CHANGE UP (ref 10–45)
ANION GAP SERPL CALC-SCNC: 16 MMOL/L — SIGNIFICANT CHANGE UP (ref 5–17)
AST SERPL-CCNC: 90 U/L — HIGH (ref 10–40)
BILIRUB SERPL-MCNC: 0.4 MG/DL — SIGNIFICANT CHANGE UP (ref 0.2–1.2)
BUN SERPL-MCNC: 20 MG/DL — SIGNIFICANT CHANGE UP (ref 7–23)
CALCIUM SERPL-MCNC: 10.8 MG/DL — HIGH (ref 8.4–10.5)
CHLORIDE SERPL-SCNC: 96 MMOL/L — SIGNIFICANT CHANGE UP (ref 96–108)
CO2 SERPL-SCNC: 20 MMOL/L — LOW (ref 22–31)
CREAT SERPL-MCNC: 1.55 MG/DL — HIGH (ref 0.5–1.3)
CULTURE RESULTS: SIGNIFICANT CHANGE UP
GLUCOSE SERPL-MCNC: 126 MG/DL — HIGH (ref 70–99)
HCT VFR BLD CALC: 35.9 % — SIGNIFICANT CHANGE UP (ref 34.5–45)
HGB BLD-MCNC: 11.7 G/DL — SIGNIFICANT CHANGE UP (ref 11.5–15.5)
MAGNESIUM SERPL-MCNC: 2.2 MG/DL — SIGNIFICANT CHANGE UP (ref 1.6–2.6)
MCHC RBC-ENTMCNC: 32.2 PG — SIGNIFICANT CHANGE UP (ref 27–34)
MCHC RBC-ENTMCNC: 32.6 GM/DL — SIGNIFICANT CHANGE UP (ref 32–36)
MCV RBC AUTO: 98.9 FL — SIGNIFICANT CHANGE UP (ref 80–100)
METHOD TYPE: SIGNIFICANT CHANGE UP
NRBC # BLD: 0 /100 WBCS — SIGNIFICANT CHANGE UP (ref 0–0)
NT-PROBNP SERPL-SCNC: 1628 PG/ML — HIGH (ref 0–300)
ORGANISM # SPEC MICROSCOPIC CNT: SIGNIFICANT CHANGE UP
ORGANISM # SPEC MICROSCOPIC CNT: SIGNIFICANT CHANGE UP
PLATELET # BLD AUTO: 170 K/UL — SIGNIFICANT CHANGE UP (ref 150–400)
POTASSIUM SERPL-MCNC: 4.2 MMOL/L — SIGNIFICANT CHANGE UP (ref 3.5–5.3)
POTASSIUM SERPL-SCNC: 4.2 MMOL/L — SIGNIFICANT CHANGE UP (ref 3.5–5.3)
PROT SERPL-MCNC: 6 G/DL — SIGNIFICANT CHANGE UP (ref 6–8.3)
RBC # BLD: 3.63 M/UL — LOW (ref 3.8–5.2)
RBC # FLD: 18 % — HIGH (ref 10.3–14.5)
SODIUM SERPL-SCNC: 132 MMOL/L — LOW (ref 135–145)
SPECIMEN SOURCE: SIGNIFICANT CHANGE UP
WBC # BLD: 6.89 K/UL — SIGNIFICANT CHANGE UP (ref 3.8–10.5)
WBC # FLD AUTO: 6.89 K/UL — SIGNIFICANT CHANGE UP (ref 3.8–10.5)

## 2021-10-09 PROCEDURE — 93010 ELECTROCARDIOGRAM REPORT: CPT

## 2021-10-09 RX ADMIN — POLYETHYLENE GLYCOL 3350 17 GRAM(S): 17 POWDER, FOR SOLUTION ORAL at 17:40

## 2021-10-09 RX ADMIN — Medication 650 MILLIGRAM(S): at 21:48

## 2021-10-09 RX ADMIN — LIDOCAINE 1 PATCH: 4 CREAM TOPICAL at 19:33

## 2021-10-09 RX ADMIN — LIDOCAINE 1 PATCH: 4 CREAM TOPICAL at 16:17

## 2021-10-09 RX ADMIN — Medication 2 MILLIGRAM(S): at 05:21

## 2021-10-09 RX ADMIN — Medication 650 MILLIGRAM(S): at 05:20

## 2021-10-09 RX ADMIN — POLYETHYLENE GLYCOL 3350 17 GRAM(S): 17 POWDER, FOR SOLUTION ORAL at 05:22

## 2021-10-09 RX ADMIN — HEPARIN SODIUM 5000 UNIT(S): 5000 INJECTION INTRAVENOUS; SUBCUTANEOUS at 14:35

## 2021-10-09 RX ADMIN — SENNA PLUS 2 TABLET(S): 8.6 TABLET ORAL at 21:47

## 2021-10-09 RX ADMIN — Medication 500 MILLIGRAM(S): at 12:20

## 2021-10-09 RX ADMIN — Medication 650 MILLIGRAM(S): at 16:12

## 2021-10-09 RX ADMIN — PERPHENAZINE 2 MILLIGRAM(S): 8 TABLET, FILM COATED ORAL at 21:48

## 2021-10-09 RX ADMIN — VALACYCLOVIR 500 MILLIGRAM(S): 500 TABLET, FILM COATED ORAL at 10:43

## 2021-10-09 RX ADMIN — HEPARIN SODIUM 5000 UNIT(S): 5000 INJECTION INTRAVENOUS; SUBCUTANEOUS at 05:21

## 2021-10-09 RX ADMIN — VALACYCLOVIR 500 MILLIGRAM(S): 500 TABLET, FILM COATED ORAL at 21:48

## 2021-10-09 RX ADMIN — HEPARIN SODIUM 5000 UNIT(S): 5000 INJECTION INTRAVENOUS; SUBCUTANEOUS at 21:48

## 2021-10-09 RX ADMIN — Medication 650 MILLIGRAM(S): at 16:42

## 2021-10-09 RX ADMIN — Medication 40 MILLIGRAM(S): at 05:21

## 2021-10-09 RX ADMIN — Medication 2 MILLIGRAM(S): at 17:40

## 2021-10-09 RX ADMIN — Medication 10 MILLIGRAM(S): at 21:47

## 2021-10-09 RX ADMIN — Medication 3 MILLILITER(S): at 05:22

## 2021-10-09 RX ADMIN — Medication 1 TABLET(S): at 12:19

## 2021-10-09 RX ADMIN — Medication 650 MILLIGRAM(S): at 22:18

## 2021-10-09 NOTE — PROGRESS NOTE ADULT - SUBJECTIVE AND OBJECTIVE BOX
Name of Patient : PREM KAY  MRN: [id number]  Date of visit: 10-09-21      Subjective: Patient seen and examined. No new events except as noted.   stable   daughter at the [facility]       MEDICATIONS:  MEDICATIONS  (STANDING):  amitriptyline 10 milliGRAM(s) Oral at bedtime  ascorbic acid 500 milliGRAM(s) Oral daily  heparin   Injectable 5000 Unit(s) SubCutaneous every 8 hours  influenza   Vaccine 0.5 milliLiter(s) IntraMuscular once  lidocaine   4% Patch 1 Patch Transdermal every 24 hours  LORazepam     Tablet 2 milliGRAM(s) Oral two times a day  multivitamin 1 Tablet(s) Oral daily  perphenazine 2 milliGRAM(s) Oral at bedtime  polyethylene glycol 3350 17 Gram(s) Oral two times a day  senna 2 Tablet(s) Oral at bedtime  valACYclovir 500 milliGRAM(s) Oral two times a day      PHYSICAL EXAM:  T(C): 37.5 (10-09-21 @ 21:27), Max: 37.5 (10-09-21 @ 21:27)  HR: 92 (10-09-21 @ 21:27) (92 - 108)  BP: 145/70 (10-09-21 @ 21:27) (122/75 - 145/70)  RR: 18 (10-09-21 @ 21:27) (17 - 20)  SpO2: 93% (10-09-21 @ 21:27) (93% - 95%)  Wt(kg): --  I&O's Summary    08 Oct 2021 07:01  -  09 Oct 2021 07:00  --------------------------------------------------------  IN: 970 mL / OUT: 801 mL / NET: 169 mL    09 Oct 2021 07:01  -  09 Oct 2021 23:52  --------------------------------------------------------  IN: 610 mL / OUT: 300 mL / NET: 310 mL          Appearance: calm, awake 	  HEENT:  PERRLA   Lymphatic: No lymphadenopathy   Cardiovascular: Normal S1 S2, no JVD  Respiratory: normal effort , clear  Gastrointestinal:  Soft, pain on palpation   Skin: No rashes,  warm to touch  Psychiatry:  Mood & affect appropriate  Musculuskeletal: No edema      All labs, Imaging and EKGs personally reviewed     10-08-21 @ 07:01  -  10-09-21 @ 07:00  --------------------------------------------------------  IN: 970 mL / OUT: 801 mL / NET: 169 mL    10-09-21 @ 07:01  -  10-09-21 @ 23:52  --------------------------------------------------------  IN: 610 mL / OUT: 300 mL / NET: 310 mL                          11.7   6.89  )-----------( 170      ( 09 Oct 2021 07:18 )             35.9               10-09    132<L>  |  96  |  20  ----------------------------<  126<H>  4.2   |  20<L>  |  1.55<H>    Ca    10.8<H>      09 Oct 2021 07:16  Mg     2.2     10-09    TPro  6.0  /  Alb  3.0<L>  /  TBili  0.4  /  DBili  x   /  AST  90<H>  /  ALT  36  /  AlkPhos  144<H>  10-09

## 2021-10-09 NOTE — PROGRESS NOTE ADULT - ASSESSMENT
81yo F with  new onset CLL, with possible staging of ovarian cancer, HTN, HLD, RA, anxiety, s/p fall and fevers.   AMS likely 2/2 infection.  dizziness better + UTI  CTH and CT c spien unremarkable   CT C A P  LDL 44, A1c 5.2  b12 WNL  - ceftriaxone for infection /UTI now off.   - heme/onc for CLL. recs appreciated   - HARJIT, IVF  - MRI brain w/ and w/o if in agreement with GOC  - check orthostatics   - PT/OT/SS/SLP, OOBC  - check FS, glucose control <180  - GI/DVT ppx  - Counseling on diet, exercise, and medication adherence was done  - Counseling on smoking cessation and alcohol consumption offered when appropriate.  - Pain assessed and judicious use of narcotics when appropriate was discussed.    - Stroke education given when appropriate.  - Importance of fall prevention discussed.   - Differential diagnosis and plan of care discussed with patient and/or family and primary team  - Thank you for allowing me to participate in the care of this patient. Call with questions.   - d/c planning. family doesn't want RA now agreed for RA Thompson MD  Vascular Neurology  Office: [phone]

## 2021-10-09 NOTE — PROGRESS NOTE ADULT - SUBJECTIVE AND OBJECTIVE BOX
Neurology Progress Note    S: Patient seen and examined. resting in bed. doing okay. family  now agrees for RA    Medication:  MEDICATIONS  (STANDING):  amitriptyline 10 milliGRAM(s) Oral at bedtime  ascorbic acid 500 milliGRAM(s) Oral daily  furosemide    Tablet 40 milliGRAM(s) Oral daily  heparin   Injectable 5000 Unit(s) SubCutaneous every 8 hours  influenza   Vaccine 0.5 milliLiter(s) IntraMuscular once  lidocaine   4% Patch 1 Patch Transdermal every 24 hours  LORazepam     Tablet 2 milliGRAM(s) Oral two times a day  multivitamin 1 Tablet(s) Oral daily  perphenazine 2 milliGRAM(s) Oral at bedtime  polyethylene glycol 3350 17 Gram(s) Oral two times a day  senna 2 Tablet(s) Oral at bedtime  valACYclovir 500 milliGRAM(s) Oral two times a day    MEDICATIONS  (PRN):  acetaminophen   Tablet .. 650 milliGRAM(s) Oral every 6 hours PRN Temp greater or equal to 38C (100.4F)  acetaminophen   Tablet .. 650 milliGRAM(s) Oral every 6 hours PRN Mild Pain (1 - 3), Moderate Pain (4 - 6)  albuterol/ipratropium for Nebulization 3 milliLiter(s) Nebulizer every 6 hours PRN Shortness of Breath and/or Wheezing      Vitals:  Vital Signs Last 24 Hrs  T(C): 37.2 (10-09-21 @ 09:26), Max: 37.2 (10-09-21 @ 09:26)  T(F): 99 (10-09-21 @ 09:26), Max: 99 (10-09-21 @ 09:26)  HR: 107 (10-09-21 @ 09:26) (92 - 109)  BP: 122/75 (10-09-21 @ 09:26) (122/75 - 148/84)  BP(mean): --  RR: 17 (10-09-21 @ 09:26) (17 - 20)  SpO2: 95% (10-09-21 @ 09:26) (93% - 95%)          General Exam:   General Appearance: Appropriately dressed and in no acute distress       Head: Normocephalic, atraumatic and no dysmorphic features  Ear, Nose, and Throat: Moist mucous membranes  CVS: S1S2+  Resp: No SOB, no wheeze or rhonchi  GI: soft NT/ND  Extremities: No edema or cyanosis  Skin: No bruises or rashes     Neurological Exam:  Mental Status: Awake, alert and oriented x 2.  Able to follow simple and complex verbal commands. Able to name and repeat. fluent speech. No obvious aphasia or dysarthria noted. masked facies. + frontal release sigbns   Cranial Nerves: PERRL, EOMI, VFFC, sensation V1-V3 intact,  L facial asymmetry, equal elevation of palate, scm/trap 5/5, tongue is midline on protrusion. no obvious papilledema on fundoscopic exam. hearing is grossly intact.   Motor:HASTINGS but + tremor L>R moves Uppers>lowers   Sensation: Intact to light touch and pinprick throughout. no right/left confusion. no extinction to tactile on DSS.   Reflexes: 1+ throughout at biceps, brachioradialis, triceps, patellars and ankles bilaterally and equal. No clonus. R toe and L toe were both downgoing.  Coordination: no dysmetria FNF  Gait: deferred   I personally reviewed the below data/images/labs:      CBC Full  -  ( 09 Oct 2021 07:18 )  WBC Count : 6.89 K/uL  RBC Count : 3.63 M/uL  Hemoglobin : 11.7 g/dL  Hematocrit : 35.9 %  Platelet Count - Automated : 170 K/uL  Mean Cell Volume : 98.9 fl  Mean Cell Hemoglobin : 32.2 pg  Mean Cell Hemoglobin Concentration : 32.6 gm/dL  Auto Neutrophil # : x  Auto Lymphocyte # : x  Auto Monocyte # : x  Auto Eosinophil # : x  Auto Basophil # : x  Auto Neutrophil % : x  Auto Lymphocyte % : x  Auto Monocyte % : x  Auto Eosinophil % : x  Auto Basophil % : x      10-09    132<L>  |  96  |  20  ----------------------------<  126<H>  4.2   |  20<L>  |  1.55<H>    Ca    10.8<H>      09 Oct 2021 07:16  Mg     2.2     10-09    TPro  6.0  /  Alb  3.0<L>  /  TBili  0.4  /  DBili  x   /  AST  90<H>  /  ALT  36  /  AlkPhos  144<H>  10-09        Urinalysis Basic - ( 05 Oct 2021 17:05 )    Color: Yellow / Appearance: Clear / S.033 / pH: x  Gluc: x / Ketone: Negative  / Bili: Negative / Urobili: Negative   Blood: x / Protein: 100 / Nitrite: Negative   Leuk Esterase: Negative / RBC: 2 /hpf / WBC 3 /HPF   Sq Epi: x / Non Sq Epi: 2 /hpf / Bacteria: Negative      < from: CT Head No Cont (10.04.21 @ 09:07) >      EXAM:  CT CERVICAL SPINE                          EXAM:  CT BRAIN                            PROCEDURE DATE:  10/04/2021            INTERPRETATION:  CLINICAL STATEMENT: Trauma..    TECHNIQUE: CT of the head and cervical spine were performed withoutIV contrast. 3-D/MIP images obtained    COMPARISON: None.    FINDINGS:  Head:  There is mild diffuse parenchymal volume loss. There are areas of low attenuation in the periventricular white matter likely related to mild chronic microvascular ischemicchanges.    There is no acute intracranial hemorrhage, parenchymal mass, mass effect or midline shift. There is no acute territorial infarct. There is no hydrocephalus. Partial empty sella    The cranium is intact.    Mild mucosal thickening right maxillary sinus    Cervical spine:  Vertebral body heights intact. Straightening of the cervical lordosis. Grade 1 retrolisthesis C3 on C4. Grade 1 anterolisthesis of C5 on C6    There is no prevertebral soft tissue swelling. The odontoid is intact.    Evaluation of the spinal canal is limited on a CT exam.  Multilevel degenerative changes noted resulting in multilevel spinal canal stenosis and neural foraminal narrowing.    Partially visualized cervical lymphadenopathy noted with multiple enlarged lymph nodes. Calcifications noted within the thyroid gland.    IMPRESSION:  No acute intracranial hemorrhage.    No acute fracture cervical spine. If pain persists, follow-up MRI exam recommended    Partially visualized cervical lymphadenopathy suspicious for lymphoma or metastatic disease. Correlate clinically    --- End of Report ---                ROBI RUTH MD; Attending Radiologist  This document has been electronically signed. Oct  4 2021  9:22AM    < end of copied text >      < from: CT Chest No Cont (10.04.21 @ 20:17) >    EXAM:  CT CHEST                            PROCEDURE DATE:  10/04/2021            INTERPRETATION:  CLINICAL INFORMATION: Shortness of breath, evaluate for pneumonia    COMPARISON: CT abdomen and pelvis 10/4/2021    CONTRAST/COMPLICATIONS:  IV Contrast: None  Oral Contrast: None  Complications: No immediate complication    PROCEDURE:  CT of the Chest was performed.  Sagittal and coronal reformats were performed.    FINDINGS:    LUNGS AND AIRWAYS: Patent central airways.  Calcified nodule abutting the left anterior pleura (series 4, image 57). 3 mm nodule in the left upper lobe (series 4, image 39). Additional 2 mm nodule in the left upper lobe (series 4, image 58).  PLEURA: No pleural effusion.  MEDIASTINUM AND ERICA: Multiple enlarged supraclavicular lymph nodes. Additional axillary and mediastinal lymphadenopathy is seen.  VESSELS: Within normal limits.  HEART: Heart size is normal. No pericardial effusion.  CHEST WALL AND LOWER NECK: Calcifications within the bilateral thyroid lobes.  VISUALIZED UPPER ABDOMEN: Please refer to prior CT abdomen/pelvis.  BONES: Degenerative changes of the spine.    IMPRESSION:  Lymphadenoapthy described above is consistent with CLL as per patient history.    No CT evidence for pneumonia.                --- End of Report ---              KADEEM YE MD; Resident Radiology  This document has been electronically signed.  REYMUNDO VARGAS MD; Attending Radiologist  This document has been electronically signed. Oct  5 2021 12:09PM    < end of copied text >  < from: CT Abdomen and Pelvis w/ IV Cont (10.04.21 @ 09:30) >    EXAM:  CT ABDOMEN AND PELVIS IC                            PROCEDURE DATE:  10/04/2021            INTERPRETATION:  CLINICAL INFORMATION: Left lower quadrant pain    COMPARISON: None.    CONTRAST/COMPLICATIONS:  IV Contrast: Omnipaque 350  90 cc administered   10 cc discarded  Oral Contrast: None  Complications: None    PROCEDURE:  CT of the Abdomen and Pelvis was performed.  Sagittal and coronal reformats were performed.    FINDINGS:  LOWER CHEST: Bibasilar atelectasis. Partially imaged enlarged right axillary lymph nodes.    LIVER: Within normal limits.  BILE DUCTS: Normal caliber.  GALLBLADDER: Within normal limits.  SPLEEN: Within normal limits.  PANCREAS: Fatty atrophy of the pancreas without cutoff. No ductal dilatation or cutoff.  ADRENALS: Within normal limits.  KIDNEYS/URETERS: Within normal limits.    BLADDER/REPRODUCTIVE ORGANS: Multiloculated right adnexal cystic mass measuring 14.6 x 9.0 cm, which abuts the appendix. Left adnexal cyst measures 2.1 x 1.4 cm. Uterus is unremarkable.    BOWEL: No bowel obstruction. Appendix is normal. Colonic diverticulosis.  PERITONEUM: 2 small nodules are present adjacent to the cystic lesion measuring 4 and 6 mm in diameter, respectively (2:75 and 79), possibly peritoneal nodules or small pericolonic lymph nodes.  VESSELS: Within normal limits.  RETROPERITONEUM/LYMPH NODES: Enlarged bilateral inguinal, iliac, mesenteric, and retroperitoneal lymph nodes.  *  Reference left inguinal lymph node measures up to 4.0 x 2.5 cm (2:93).  *  Reference left periaortic lymph node measures up to 2.5 x 2.3 cm (2:45).  ABDOMINAL WALL: Within normal limits.  BONES: Degenerative changes. Lumbar dextroscoliosis.    IMPRESSION:  Enlarged bilateral inguinal, iliac, mesenteric, and retroperitoneal lymph nodes. Partially imaged enlarged right axillary lymph nodes. Primary consideration is lymphoma. Metastatic disease is not excluded. Dedicated chest imaging is recommended for full evaluation.    Cystic right adnexal mass measuring 14.6 x 9.0 cm, concerning for epithelial neoplasm.      --- End of Report ---      WALTER ALANIS MD; Resident Interventional Radiology  This document has been electronically signed.  DAREN MOHAN MD; Attending Radiologist  This document has been electronically signed. Oct  4 2021 11:14AM    < end of copied text >

## 2021-10-09 NOTE — PROGRESS NOTE ADULT - ASSESSMENT
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   She was hospitalized at [facility] a few months ago for a cardiac evaluation, which was negative, per daughter    Admitted to [facility] with fever and weakness associated with 2 falls on 10/03/21.  She developed fever on 10/03 with accelerated weakness and brought to ER for evaluation.   Fever to 101.4 in the ER prompted CTX.  Etiology of fever not clear although "B" symptoms of lymphoma possible but her decline in functional status seemed acute.   Reported abdominal pain and bloating but CT scan without acute pathology.   UA with 14 WBCs, urine cx with Klebsiella - doubt UTI   Blood cx NGTD   On empiric Ceftriaxone  CT chest without PNA  WNV serology neg  Quant TB test neg  Repeat RVP still neg   Repeated urine cx neg & blood cx NGTD  Last fever was on 10/05/21   Fevers have resolved, but weak & deconditioned, interaction limited  Empiric Ceftriaxone was stopped on 10/08/21  Seen by derm on 10/07 for new onset of clean based bilateral buttock & lower sacral area ulcers - started on Valtrex & they have been swabbed for HSV & CMV by derm  Wound cx of these ulcers was also submitted - gm + organisms represent colonizing winter     Suggest:  Follow blood culture - until final neg  Hold off of additional empiric antibiotics   Valtrex for buttock ulcers   Pt will need to follow up with her primary oncologist to review further mgmt plans at [facility]

## 2021-10-09 NOTE — PROGRESS NOTE ADULT - SUBJECTIVE AND OBJECTIVE BOX
Patient is a 80y old  Female who presents with a chief complaint of AMS (09 Oct 2021 10:34)      INTERVAL HISTORY:     TELEMETRY Personally reviewed:    REVIEW OF SYSTEMS:   CONSTITUTIONAL: No weakness  EYES/ENT: No visual changes; No throat pain  Neck: No pain or stiffness  Respiratory: No cough, wheezing, No shortness of breath  CARDIOVASCULAR: no chest pain or palpitations  GASTROINTESTINAL: No abdominal pain, no nausea, vomiting or hematemesis  GENITOURINARY: No dysuria, frequency or hematuria  NEUROLOGICAL: No stroke like symptoms  SKIN: rash on buttocks    	  MEDICATIONS:        PHYSICAL EXAM:  T(C): 36.7 (10-09-21 @ 13:33), Max: 37.2 (10-09-21 @ 09:26)  HR: 108 (10-09-21 @ 13:33) (92 - 108)  BP: 129/72 (10-09-21 @ 13:33) (122/75 - 148/84)  RR: 17 (10-09-21 @ 13:33) (17 - 20)  SpO2: 93% (10-09-21 @ 13:33) (93% - 95%)  Wt(kg): --  I&O's Summary    08 Oct 2021 07:01  -  09 Oct 2021 07:00  --------------------------------------------------------  IN: 970 mL / OUT: 801 mL / NET: 169 mL    09 Oct 2021 07:01  -  09 Oct 2021 16:35  --------------------------------------------------------  IN: 340 mL / OUT: 250 mL / NET: 90 mL          Appearance: In no distress	  HEENT:    PERRL, EOMI	  Cardiovascular:  S1 S2, No JVD  Respiratory: Lungs clear to auscultation	  Gastrointestinal:  Soft, Non-tender, + BS	  Vascularature:  No edema of LE  Psychiatric: Appropriate affect   Neuro: no acute focal deficits                               11.7   6.89  )-----------( 170      ( 09 Oct 2021 07:18 )             35.9     10-09    132<L>  |  96  |  20  ----------------------------<  126<H>  4.2   |  20<L>  |  1.55<H>    Ca    10.8<H>      09 Oct 2021 07:16  Mg     2.2     10-09    TPro  6.0  /  Alb  3.0<L>  /  TBili  0.4  /  DBili  x   /  AST  90<H>  /  ALT  36  /  AlkPhos  144<H>  10-09        Labs personally reviewed      ASSESSMENT/PLAN: 	    Patient is a 80 PMHx of new onset CLL, with possible staging of ovarian cancer, HTN, HLD, CC s/p fall and fevers.  Per daughter, pt lives at home alone, fell two times while getting out of bed. States she fell onto both hands and knees while rolling out of bed. No LOC.  Denied any lightheadedness, dizziness at the time of fall.  Currently states she has some dizziness, like she would fall over if she had to walk. Was brought in today due to having a fever. PT complaining of SOB as well, and has pain in both knees. Denies cough, headache, chest pain, nausea, vomiting. Has not started any chemotherapy per daughter    # AMS and dizziness   check orthostatics  Neurology eval recommends MRI of brain- pending [facility] discussion  - unlikely cardiac     # SOB  d/pablito IVF as CXR with mild pulm vasc congestion  - Recurrent SOB again this am  - Lasix 40mg IV daily, close monitor Cr given recent HARJIT    # HARJIT   -per nephro:  Baseline Scr unclear  HARJIT possibly sec to contrast s/p 10/4   Renal function was improving but worsened today  Has high ca as well, possibly dehydrated  Hold lasix for a day-received today's dose   Monitor renal function at present  Avoid nephrotoxics, NSAIds RCA    # HTN   resume BP meds   adjust as tolerated     # HLD   lipid panel normal except HDL 15          Madelin Marshall MSN, FNP-BC, AGACNP-BC, NICCI  [place] Monaco, DO [facility]  Cardiovascular Medicine  [address], [address]  Office: [phone]  Cell: [phone] Patient is a 80y old  Female who presents with a chief complaint of AMS (09 Oct 2021 10:34)      INTERVAL HISTORY:     TELEMETRY Personally reviewed:    REVIEW OF SYSTEMS:   CONSTITUTIONAL: No weakness  EYES/ENT: No visual changes; No throat pain  Neck: No pain or stiffness  Respiratory: No cough, wheezing, No shortness of breath  CARDIOVASCULAR: no chest pain or palpitations  GASTROINTESTINAL: No abdominal pain, no nausea, vomiting or hematemesis  GENITOURINARY: No dysuria, frequency or hematuria  NEUROLOGICAL: No stroke like symptoms  SKIN: rash on buttocks    	  MEDICATIONS:        PHYSICAL EXAM:  T(C): 36.7 (10-09-21 @ 13:33), Max: 37.2 (10-09-21 @ 09:26)  HR: 108 (10-09-21 @ 13:33) (92 - 108)  BP: 129/72 (10-09-21 @ 13:33) (122/75 - 148/84)  RR: 17 (10-09-21 @ 13:33) (17 - 20)  SpO2: 93% (10-09-21 @ 13:33) (93% - 95%)  Wt(kg): --  I&O's Summary    08 Oct 2021 07:01  -  09 Oct 2021 07:00  --------------------------------------------------------  IN: 970 mL / OUT: 801 mL / NET: 169 mL    09 Oct 2021 07:01  -  09 Oct 2021 16:35  -----------------------------------------------  IN: 340 mL / OUT: 250 mL / NET: 90 mL          Appearance: In no distress	  HEENT:    PERRL, EOMI	  Cardiovascular:  S1 S2, No JVD  Respiratory: Lungs clear to auscultation	  Gastrointestinal:  Soft, Non-tender, + BS	  Vascularature:  No edema of LE  Psychiatric: Appropriate affect   Neuro: no acute focal deficits                               11.7   6.89  )-----------( 170      ( 09 Oct 2021 07:18 )             35.9     10-09    132<L>  |  96  |  20  ----------------------------<  126<H>  4.2   |  20<L>  |  1.55<H>    Ca    10.8<H>      09 Oct 2021 07:16  Mg     2.2     10-09    TPro  6.0  /  Alb  3.0<L>  /  TBili  0.4  /  DBili  x   /  AST  90<H>  /  ALT  36  /  AlkPhos  144<H>  10-09        Labs personally reviewed      ASSESSMENT/PLAN: 	  Patient is a 80 PMHx of new onset CLL, with possible staging of ovarian cancer, HTN, HLD, CC s/p fall and fevers.  Per daughter, pt lives at home alone, fell two times while getting out of bed. States she fell onto both hands and knees while rolling out of bed. No LOC.  Denied any lightheadedness, dizziness at the time of fall.  Currently states she has some dizziness, like she would fall over if she had to walk. Was brought in today due to having a fever. PT complaining of SOB as well, and has pain in both knees. Denies cough, headache, chest pain, nausea, vomiting. Has not started any chemotherapy per daughter    # AMS and dizziness   check orthostatics  Neurology eval recommends MRI of brain- pending [facility] discussion  - unlikely cardiac     # SOB  d/pablito IVF as CXR with mild pulm vasc congestion  - Recurrent SOB again this am  - Lasix 40mg IV daily, close monitor Cr given recent HARJIT    # HARJIT   -per nephro:  Baseline Scr unclear  HARJIT possibly sec to contrast s/p 10/4   Renal function was improving but worsened today  Has high ca as well, possibly dehydrated  Hold lasix for a day-received today's dose   Monitor renal function at present  Avoid nephrotoxics, NSAIds RCA    # HTN   resume BP meds   adjust as tolerated     # HLD   lipid panel normal except HDL 15          Madelin Marshall MSN, FNP-BC, AGACNP-BC, NICCI  [place] Monaco, DO [facility]  Cardiovascular Medicine  [address], [address]  Office: [phone]  Cell: [phone]

## 2021-10-09 NOTE — PROGRESS NOTE ADULT - SUBJECTIVE AND OBJECTIVE BOX
Dr. Lowe  Office [phone]  Cell [phone]  Tanika SAMS  Cell [phone]    RENAL PROGRESS NOTE: DATE OF SERVICE 10-09-21 @ 12:42    Patient is a 80y old  Female who presents with a chief complaint of AMS (09 Oct 2021 10:34)      Patient seen and examined at bedside. No chest pain/sob    VITALS:  T(F): 99 (10-09-21 @ 09:26), Max: 99 (10-09-21 @ 09:26)  HR: 107 (10-09-21 @ 09:26)  BP: 122/75 (10-09-21 @ 09:26)  RR: 17 (10-09-21 @ 09:26)  SpO2: 95% (10-09-21 @ 09:26)  Wt(kg): --    10-08 @ 07:01  -  10-09 @ 07:00  --------------------------------------------------------  IN: 970 mL / OUT: 801 mL / NET: 169 mL    10-09 @ 07:01  -  10-09 @ 12:42  --------------------------------------------------------  IN: 120 mL / OUT: 150 mL / NET: -30 mL          PHYSICAL EXAM:  Constitutional: NAD  Neck: No JVD  Respiratory: CTAB, no wheezes, rales or rhonchi  Cardiovascular: S1, S2, RRR  Gastrointestinal: BS+, soft, NT/ND  Extremities: No peripheral edema    Hospital Medications:   MEDICATIONS  (STANDING):  amitriptyline 10 milliGRAM(s) Oral at bedtime  ascorbic acid 500 milliGRAM(s) Oral daily  furosemide    Tablet 40 milliGRAM(s) Oral daily  heparin   Injectable 5000 Unit(s) SubCutaneous every 8 hours  influenza   Vaccine 0.5 milliLiter(s) IntraMuscular once  lidocaine   4% Patch 1 Patch Transdermal every 24 hours  LORazepam     Tablet 2 milliGRAM(s) Oral two times a day  multivitamin 1 Tablet(s) Oral daily  perphenazine 2 milliGRAM(s) Oral at bedtime  polyethylene glycol 3350 17 Gram(s) Oral two times a day  senna 2 Tablet(s) Oral at bedtime  valACYclovir 500 milliGRAM(s) Oral two times a day      LABS:  10-09    132<L>  |  96  |  20  ----------------------------<  126<H>  4.2   |  20<L>  |  1.55<H>    Ca    10.8<H>      09 Oct 2021 07:16  Mg     2.2     10-09    TPro  6.0  /  Alb  3.0<L>  /  TBili  0.4  /  DBili      /  AST  90<H>  /  ALT  36  /  AlkPhos  144<H>  10-09    Creatinine Trend: 1.55 <--, 1.31 <--, 1.20 <--, 1.28 <--, 1.52 <--, 1.43 <--    Albumin, Serum: 3.0 g/dL (10-09 @ 07:16)                              11.7   6.89  )-----------( 170      ( 09 Oct 2021 07:18 )             35.9     Urine Studies:  Urinalysis - [10-05-21 @ 17:05]      Color Yellow / Appearance Clear / SG 1.033 / pH 6.0      Gluc Negative / Ketone Negative  / Bili Negative / Urobili Negative       Blood Moderate / Protein 100 / Leuk Est Negative / Nitrite Negative      RBC 2 / WBC 3 / Hyaline 1 / Gran  / Sq Epi  / Non Sq Epi 2 / Bacteria Negative    Urine Creatinine 77      [10-05-21 @ 17:05]  Urine Sodium 60      [10-07-21 @ 12:53]  Urine Osmolality 474      [10-07-21 @ 12:53]    TSH 1.15      [10-05-21 @ 16:30]  Lipid: chol 87, , HDL 15, LDL --      [10-05-21 @ 16:30]      Free Light Chains: kappa 22.16, lambda 2.82, ratio = 7.86      [10-07 @ 17:52]    RADIOLOGY & ADDITIONAL STUDIES:

## 2021-10-09 NOTE — PROGRESS NOTE ADULT - ASSESSMENT
Patient is a 80 PMHx of new onset CLL, with possible staging of ovarian cancer, HTN, HLD, CC s/p fall and fevers.  Per daughter, pt lives at home alone, fell two times while getting out of bed. States she fell onto both hands and knees while rolling out of bed. No LOC.  Denied any lightheadedness, dizziness at the time of fall.  Currently states she has some dizziness, like she would fall over if she had to walk. Was brought in today due to having a fever. PT complaining of SOB as well, and has pain in both knees. Denies cough, headache, chest pain, nausea, vomiting. Has not started any chemotherapy per daughter    # AMS and dizziness   CT head noted  fall precautions  orthostatics  Neurology eval appreciated follow up recs    # Sepsis  R/O sepsis   UTI vs tumor fever vs other etiologies   Pan cx   cont Rocephin for now  monitor clinically   ID eval appreciated   Hydration as tolerated  Pan CT noted      # HARJIT   HARJIT resolved  IV hydration discontinued due to episode of wheezing  Monitor renal function  I and Os   As per nephrology, monitor renal function and avoid nephrotoxic     # Ovarian Ca  with mets  seen on CT   onc follow up   Onc eval called, patient follows with [facility], outpatient follow up     # HTN   resume BP meds   adjust as tolerated     # HLD   lipid panel     #Wheezing, fluid overload likely   Cont Duonebs  D/C IVF  will adjust diuresis  BNP results noted. Will follow  Echo ordered, awaiting the results  Card eval appreciated     DVT and GI PPX

## 2021-10-09 NOTE — PROGRESS NOTE ADULT - ASSESSMENT
Patient is a 80 PMHx of new onset CLL, with possible staging of ovarian cancer, HTN, HLD, CC s/p fall and fevers.  Per daughter, pt lives at home alone, fell two times while getting out of bed. States she fell onto both hands and knees while rolling out of bed. No LOC.  Denied any lightheadedness, dizziness at the time of fall.  Was brought in today due to having a fever. PT complaining of SOB as well, and has pain in both knees. Denies cough, headache, chest pain, nausea, vomiting. Has not started any chemotherapy per daughter. Has renal failure, denied h/o renal failure      A/P:  HARJIT  Baseline Scr unclear  HARJIT possibly sec to contrast s/p 10/4   Renal function was improving but worsened today  Has high ca as well, possibly dehydrated  Hold lasix for a day-received today's dose   Monitor renal function at present  Avoid nephrotoxics, NSAIds RCA    Hyponatremia  Initial Urine lytes suggestive of dehydration   Repeat Urine lytes suggestive of SIADH  Sodium worsened today  Monitor Na level daily   Fluid restriction 1l/day                    Continue  antibiotics in  NS instead of D5     HTN:  not on antihypertensive meds  monitor at present       Proteinuria/Hematuria  in setting of UTI  Repeat UA post tx   Ct A/P with no renal mass

## 2021-10-10 LAB
ANION GAP SERPL CALC-SCNC: 16 MMOL/L — SIGNIFICANT CHANGE UP (ref 5–17)
BUN SERPL-MCNC: 23 MG/DL — SIGNIFICANT CHANGE UP (ref 7–23)
CALCIUM SERPL-MCNC: 12 MG/DL — HIGH (ref 8.4–10.5)
CHLORIDE SERPL-SCNC: 94 MMOL/L — LOW (ref 96–108)
CO2 SERPL-SCNC: 21 MMOL/L — LOW (ref 22–31)
CREAT ?TM UR-MCNC: 79 MG/DL — SIGNIFICANT CHANGE UP
CREAT SERPL-MCNC: 1.55 MG/DL — HIGH (ref 0.5–1.3)
CULTURE RESULTS: SIGNIFICANT CHANGE UP
CULTURE RESULTS: SIGNIFICANT CHANGE UP
GLUCOSE SERPL-MCNC: 127 MG/DL — HIGH (ref 70–99)
POTASSIUM SERPL-MCNC: 4 MMOL/L — SIGNIFICANT CHANGE UP (ref 3.5–5.3)
POTASSIUM SERPL-SCNC: 4 MMOL/L — SIGNIFICANT CHANGE UP (ref 3.5–5.3)
SODIUM SERPL-SCNC: 131 MMOL/L — LOW (ref 135–145)
SPECIMEN SOURCE: SIGNIFICANT CHANGE UP
SPECIMEN SOURCE: SIGNIFICANT CHANGE UP
UUN UR-MCNC: 357 MG/DL — SIGNIFICANT CHANGE UP

## 2021-10-10 RX ORDER — SODIUM CHLORIDE 9 MG/ML
1000 INJECTION INTRAMUSCULAR; INTRAVENOUS; SUBCUTANEOUS
Refills: 0 | Status: DISCONTINUED | OUTPATIENT
Start: 2021-10-10 | End: 2021-10-17

## 2021-10-10 RX ADMIN — Medication 650 MILLIGRAM(S): at 22:32

## 2021-10-10 RX ADMIN — HEPARIN SODIUM 5000 UNIT(S): 5000 INJECTION INTRAVENOUS; SUBCUTANEOUS at 05:41

## 2021-10-10 RX ADMIN — Medication 10 MILLIGRAM(S): at 22:00

## 2021-10-10 RX ADMIN — Medication 2 MILLIGRAM(S): at 18:46

## 2021-10-10 RX ADMIN — Medication 2 MILLIGRAM(S): at 05:41

## 2021-10-10 RX ADMIN — HEPARIN SODIUM 5000 UNIT(S): 5000 INJECTION INTRAVENOUS; SUBCUTANEOUS at 13:33

## 2021-10-10 RX ADMIN — Medication 650 MILLIGRAM(S): at 11:46

## 2021-10-10 RX ADMIN — LIDOCAINE 1 PATCH: 4 CREAM TOPICAL at 04:30

## 2021-10-10 RX ADMIN — Medication 1 TABLET(S): at 11:44

## 2021-10-10 RX ADMIN — LIDOCAINE 1 PATCH: 4 CREAM TOPICAL at 18:46

## 2021-10-10 RX ADMIN — Medication 650 MILLIGRAM(S): at 05:46

## 2021-10-10 RX ADMIN — Medication 500 MILLIGRAM(S): at 11:45

## 2021-10-10 RX ADMIN — Medication 650 MILLIGRAM(S): at 22:02

## 2021-10-10 RX ADMIN — SENNA PLUS 2 TABLET(S): 8.6 TABLET ORAL at 22:01

## 2021-10-10 RX ADMIN — Medication 650 MILLIGRAM(S): at 13:16

## 2021-10-10 RX ADMIN — LIDOCAINE 1 PATCH: 4 CREAM TOPICAL at 19:23

## 2021-10-10 RX ADMIN — POLYETHYLENE GLYCOL 3350 17 GRAM(S): 17 POWDER, FOR SOLUTION ORAL at 05:41

## 2021-10-10 RX ADMIN — VALACYCLOVIR 500 MILLIGRAM(S): 500 TABLET, FILM COATED ORAL at 10:52

## 2021-10-10 RX ADMIN — VALACYCLOVIR 500 MILLIGRAM(S): 500 TABLET, FILM COATED ORAL at 22:00

## 2021-10-10 RX ADMIN — Medication 650 MILLIGRAM(S): at 06:16

## 2021-10-10 RX ADMIN — HEPARIN SODIUM 5000 UNIT(S): 5000 INJECTION INTRAVENOUS; SUBCUTANEOUS at 22:00

## 2021-10-10 RX ADMIN — PERPHENAZINE 2 MILLIGRAM(S): 8 TABLET, FILM COATED ORAL at 22:00

## 2021-10-10 NOTE — PROVIDER CONTACT NOTE (CHANGE IN STATUS NOTIFICATION) - ASSESSMENT
Pt noted with increased confusion, less verbal. c/o of abd pain medicated with tylenol at this time.

## 2021-10-10 NOTE — PROVIDER CONTACT NOTE (OTHER) - REASON
bladder scan showing 892
Pt febrile with a temp of 101.1 F
sores on b/l buttock assessed by wound, as per family at bedside shave obtained and dx w herpes
Pt dyspneic and tachypneic

## 2021-10-10 NOTE — PROGRESS NOTE ADULT - SUBJECTIVE AND OBJECTIVE BOX
Name of Patient : PREM KAY  MRN: [id number]  Date of visit: 10-10-21 @ 11:09      Subjective: Patient seen and examined. No new events except as noted.  Patient is pending rehab.         MEDICATIONS:  MEDICATIONS  (STANDING):  amitriptyline 10 milliGRAM(s) Oral at bedtime  ascorbic acid 500 milliGRAM(s) Oral daily  heparin   Injectable 5000 Unit(s) SubCutaneous every 8 hours  influenza   Vaccine 0.5 milliLiter(s) IntraMuscular once  lidocaine   4% Patch 1 Patch Transdermal every 24 hours  LORazepam     Tablet 2 milliGRAM(s) Oral two times a day  multivitamin 1 Tablet(s) Oral daily  perphenazine 2 milliGRAM(s) Oral at bedtime  polyethylene glycol 3350 17 Gram(s) Oral two times a day  senna 2 Tablet(s) Oral at bedtime  valACYclovir 500 milliGRAM(s) Oral two times a day      PHYSICAL EXAM:  T(C): 36.6 (10-10-21 @ 10:34), Max: 37.5 (10-09-21 @ 21:27)  HR: 100 (10-10-21 @ 10:34) (91 - 108)  BP: 157/76 (10-10-21 @ 10:34) (129/72 - 157/76)  RR: 17 (10-10-21 @ 10:34) (17 - 18)  SpO2: 93% (10-10-21 @ 10:34) (93% - 94%)  Wt(kg): --  I&O's Summary    09 Oct 2021 07:01  -  10 Oct 2021 07:00  --------------------------------------------------------  IN: 850 mL / OUT: 450 mL / NET: 400 mL    10 Oct 2021 07:01  -  10 Oct 2021 11:09  --------------------------------------------------------  IN: 120 mL / OUT: 0 mL / NET: 120 mL          Appearance: Calm, awake   HEENT:  PERRLA   Lymphatic: No lymphadenopathy   Cardiovascular: Normal S1 S2, no JVD  Respiratory: normal effort , clear  Gastrointestinal:  Soft, Non-tender  Skin: No rashes,  warm to touch  Psychiatry:  Mood & affect appropriate  Musculuskeletal: No edema      All labs, Imaging and EKGs personally reviewed       10-09-21 @ 07:01  -  10-10-21 @ 07:00  --------------------------------------------------------  IN: 850 mL / OUT: 450 mL / NET: 400 mL    10-10-21 @ 07:01  -  10-10-21 @ 11:09  --------------------------------------------------------  IN: 120 mL / OUT: 0 mL / NET: 120 mL                            11.7   6.89  )-----------( 170      ( 09 Oct 2021 07:18 )             35.9               10-10    131<L>  |  94<L>  |  23  ----------------------------<  127<H>  4.0   |  21<L>  |  1.55<H>    Ca    12.0<H>      10 Oct 2021 07:22  Mg     2.2     10-09    TPro  6.0  /  Alb  3.0<L>  /  TBili  0.4  /  DBili  x   /  AST  90<H>  /  ALT  36  /  AlkPhos  144<H>  10-09

## 2021-10-10 NOTE — PROVIDER CONTACT NOTE (OTHER) - BACKGROUND
p/w: fevers, s/p fall at home; PMH: CLL w/ newly diagnosis of ovarian cancer, HTN, HLD.
pt admitted s/p fall, weakness and SOB
Pt admitted on 10/4 with sepsis. History of asthma.
paperwork indicating shave and result brought to bedside and placed in patient chart.

## 2021-10-10 NOTE — PROGRESS NOTE ADULT - SUBJECTIVE AND OBJECTIVE BOX
Patient is a 80y old  Female who presents with a chief complaint of AMS (09 Oct 2021 10:34)      INTERVAL HISTORY: no changes  TELEMETRY Personally reviewed: NSR with frequent PAC's     REVIEW OF SYSTEMS:   CONSTITUTIONAL: No weakness  EYES/ENT: No visual changes; No throat pain  Neck: No pain or stiffness  Respiratory: No cough, wheezing, No shortness of breath  CARDIOVASCULAR: no chest pain or palpitations  GASTROINTESTINAL: No abdominal pain, no nausea, vomiting or hematemesis  GENITOURINARY: No dysuria, frequency or hematuria  NEUROLOGICAL: No stroke like symptoms  SKIN: rashes on buttocks    	  MEDICATIONS:        PHYSICAL EXAM:  T(C): 36.9 (10-10-21 @ 14:29), Max: 37.5 (10-09-21 @ 21:27)  HR: 109 (10-10-21 @ 14:29) (91 - 109)  BP: 123/77 (10-10-21 @ 14:29) (123/77 - 157/76)  RR: 17 (10-10-21 @ 14:29) (17 - 18)  SpO2: 93% (10-10-21 @ 14:29) (93% - 94%)  Wt(kg): --  I&O's Summary    09 Oct 2021 07:01  -  10 Oct 2021 07:00  --------------------------------------------------------  IN: 850 mL / OUT: 450 mL / NET: 400 mL    10 Oct 2021 07:01  -  10 Oct 2021 14:29  --------------------------------------------------------  IN: 120 mL / OUT: 0 mL / NET: 120 mL          Appearance: In no distress	  HEENT:    PERRL, EOMI	  Cardiovascular:  S1 S2, No JVD  Respiratory: Lungs clear to auscultation	  Gastrointestinal:  Soft, Non-tender, + BS	  Vascularature:  No edema of LE  Psychiatric: Appropriate affect   Neuro: a & ox2                              11.7   6.89  )-----------( 170      ( 09 Oct 2021 07:18 )             35.9     10-10    131<L>  |  94<L>  |  23  ----------------------------<  127<H>  4.0   |  21<L>  |  1.55<H>    Ca    12.0<H>      10 Oct 2021 07:22  Mg     2.2     10-09    TPro  6.0  /  Alb  3.0<L>  /  TBili  0.4  /  DBili  x   /  AST  90<H>  /  ALT  36  /  AlkPhos  144<H>  10-09        Labs personally reviewed      ASSESSMENT/PLAN: 	  Patient is a 80 PMHx of new onset CLL, with possible staging of ovarian cancer, HTN, HLD, CC s/p fall and fevers.  Per daughter, pt lives at home alone, fell two times while getting out of bed. States she fell onto both hands and knees while rolling out of bed. No LOC.  Denied any lightheadedness, dizziness at the time of fall.  Currently states she has some dizziness, like she would fall over if she had to walk. Was brought in today due to having a fever. PT complaining of SOB as well, and has pain in both knees. Denies cough, headache, chest pain, nausea, vomiting. Has not started any chemotherapy per daughter    # AMS and dizziness   check orthostatics  Neurology eval recommends MRI of brain- pending [facility] discussion  - unlikely cardiac     # SOB  d/pablito IVF as CXR with mild pulm vasc congestion  - Recurrent SOB again this am  - Lasix 40mg IV daily, close monitor Cr given recent HARJIT    # HARJIT   -per nephro:  Baseline Scr unclear  HARJIT possibly sec to contrast s/p 10/4   Renal function was improving but worsened today  Has high ca as well, possibly dehydrated  Hold lasix for a day-received today's dose   Monitor renal function at present  Avoid nephrotoxics, NSAIds RCA    # HTN   continue BP meds  adjust as tolerated     # HLD   lipid panel normal except HDL 15          Madelin Marshall MSN, FNP-BC, AGACNP-BC, NICCI  [place],  [facility]  Cardiovascular Medicine  [address], [address]  Office: [phone]  Cell: [phone]

## 2021-10-10 NOTE — PROGRESS NOTE ADULT - SUBJECTIVE AND OBJECTIVE BOX
Dr. Lowe  Office [phone]  Cell [phone]  Tanika SAMS  Cell [phone]    RENAL PROGRESS NOTE: DATE OF SERVICE 10-10-21 @ 13:47    Patient is a 80y old  Female who presents with a chief complaint of AMS (09 Oct 2021 10:34)      Patient seen and examined at bedside. No chest pain/sob    VITALS:  T(F): 97.8 (10-10-21 @ 10:34), Max: 99.5 (10-09-21 @ 21:27)  HR: 100 (10-10-21 @ 10:34)  BP: 157/76 (10-10-21 @ 10:34)  RR: 17 (10-10-21 @ 10:34)  SpO2: 93% (10-10-21 @ 10:34)  Wt(kg): --    10-09 @ 07:01  -  10-10 @ 07:00  --------------------------------------------------------  IN: 850 mL / OUT: 450 mL / NET: 400 mL    10-10 @ 07:01  -  10-10 @ 13:47  --------------------------------------------------------  IN: 120 mL / OUT: 0 mL / NET: 120 mL          PHYSICAL EXAM:  Constitutional: NAD  Neck: No JVD  Respiratory: CTAB, no wheezes, rales or rhonchi  Cardiovascular: S1, S2, RRR  Gastrointestinal: BS+, soft, NT/ND  Extremities: No peripheral edema    Hospital Medications:   MEDICATIONS  (STANDING):  amitriptyline 10 milliGRAM(s) Oral at bedtime  ascorbic acid 500 milliGRAM(s) Oral daily  heparin   Injectable 5000 Unit(s) SubCutaneous every 8 hours  influenza   Vaccine 0.5 milliLiter(s) IntraMuscular once  lidocaine   4% Patch 1 Patch Transdermal every 24 hours  LORazepam     Tablet 2 milliGRAM(s) Oral two times a day  multivitamin 1 Tablet(s) Oral daily  perphenazine 2 milliGRAM(s) Oral at bedtime  polyethylene glycol 3350 17 Gram(s) Oral two times a day  senna 2 Tablet(s) Oral at bedtime  valACYclovir 500 milliGRAM(s) Oral two times a day      LABS:  10-10    131<L>  |  94<L>  |  23  ----------------------------<  127<H>  4.0   |  21<L>  |  1.55<H>    Ca    12.0<H>      10 Oct 2021 07:22  Mg     2.2     10-09    TPro  6.0  /  Alb  3.0<L>  /  TBili  0.4  /  DBili      /  AST  90<H>  /  ALT  36  /  AlkPhos  144<H>  10-09    Creatinine Trend: 1.55 <--, 1.55 <--, 1.31 <--, 1.20 <--, 1.28 <--, 1.52 <--, 1.43 <--                                11.7   6.89  )-----------( 170      ( 09 Oct 2021 07:18 )             35.9     Urine Studies:  Urinalysis - [10-05-21 @ 17:05]      Color Yellow / Appearance Clear / SG 1.033 / pH 6.0      Gluc Negative / Ketone Negative  / Bili Negative / Urobili Negative       Blood Moderate / Protein 100 / Leuk Est Negative / Nitrite Negative      RBC 2 / WBC 3 / Hyaline 1 / Gran  / Sq Epi  / Non Sq Epi 2 / Bacteria Negative    Urine Creatinine 77      [10-05-21 @ 17:05]  Urine Sodium 60      [10-07-21 @ 12:53]  Urine Osmolality 474      [10-07-21 @ 12:53]    TSH 1.15      [10-05-21 @ 16:30]  Lipid: chol 87, , HDL 15, LDL --      [10-05-21 @ 16:30]      Free Light Chains: kappa 22.16, lambda 2.82, ratio = 7.86      [10-07 @ 17:52]    RADIOLOGY & ADDITIONAL STUDIES:

## 2021-10-10 NOTE — PROGRESS NOTE ADULT - ASSESSMENT
Patient is a 80 PMHx of new onset CLL, with possible staging of ovarian cancer, HTN, HLD, CC s/p fall and fevers.  Per daughter, pt lives at home alone, fell two times while getting out of bed. States she fell onto both hands and knees while rolling out of bed. No LOC.  Denied any lightheadedness, dizziness at the time of fall.  Was brought in today due to having a fever. PT complaining of SOB as well, and has pain in both knees. Denies cough, headache, chest pain, nausea, vomiting. Has not started any chemotherapy per daughter. Has renal failure, denied h/o renal failure      A/P:  HARJIT  Baseline Scr unclear  HARJIT possibly sec to contrast s/p 10/4   Renal function was improving but worsened today  Has high ca as well, possibly dehydrated  Hold lasix for a day-received today's dose   Monitor renal function at present  Urine urea nitrogen, cr  Avoid nephrotoxics, NSAIds RCA    Hypercalcemia:  Etiology?  dehydration Vs sec to malignancy  NS 50cc/hr for 1L  Oncology follow up    Hyponatremia  Initial Urine lytes suggestive of dehydration   Repeat Urine lytes suggestive of SIADH  Sodium worsened today  Monitor Na level daily   Fluid restriction 1l/day                    Continue  antibiotics in  NS instead of D5     HTN:  not on antihypertensive meds  monitor at present     Proteinuria/Hematuria  in setting of UTI  Repeat UA post tx   Ct A/P with no renal mass

## 2021-10-10 NOTE — PROVIDER CONTACT NOTE (CHANGE IN STATUS NOTIFICATION) - BACKGROUND
Patient is a 80 PMHx of new onset CLL, with possible staging of ovarian cancer, HTN, HLD, CC s/p fall and fevers.  Per daughter, pt lives at home alone, fell two times while getting out of bed.

## 2021-10-10 NOTE — PROGRESS NOTE ADULT - ASSESSMENT
Patient is a 80 PMHx of new onset CLL, with possible staging of ovarian cancer, HTN, HLD, CC s/p fall and fevers.  Per daughter, pt lives at home alone, fell two times while getting out of bed. States she fell onto both hands and knees while rolling out of bed. No LOC.  Denied any lightheadedness, dizziness at the time of fall.  Currently states she has some dizziness, like she would fall over if she had to walk. Was brought in today due to having a fever. PT complaining of SOB as well, and has pain in both knees. Denies cough, headache, chest pain, nausea, vomiting. Has not started any chemotherapy per daughter    # AMS and dizziness   CT head noted  fall precautions  orthostatics  Neurology eval appreciated follow up recs    # Sepsis  R/O sepsis   UTI vs tumor fever vs other etiologies   Pan cx   Completed course of Rocephin, stopped on 10/08 as per ID  monitor clinically   ID eval appreciated   Hydration as tolerated  Pan CT noted      # HARJIT   HARJIT will monitor renal function  -Elevated calcium, as per renal will hold Lasix for 1 day due to likely dehydration, repeat BMP tomorrow   IV hydration discontinued due to episode of wheezing  Monitor renal function  I and Os   As per nephrology, monitor renal function and avoid nephrotoxic     # Ovarian Ca  with mets  seen on CT   onc follow up   Onc eval called, patient follows with TRUNG, outpatient follow up     # HTN   resume BP meds   adjust as tolerated     # HLD   lipid panel     #Wheezing, fluid overload likely   Cont Duonebs  D/C IVF  will adjust diuresis  BNP results noted. Will follow  Echo ordered, awaiting the results  Card eval appreciated     DVT and GI PPX     Pending d/c to rehab

## 2021-10-10 NOTE — PROGRESS NOTE ADULT - ASSESSMENT
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   She was hospitalized at [facility] a few months ago for a cardiac evaluation, which was negative, per daughter    Admitted to [facility] with fever and weakness associated with 2 falls on 10/03/21.  She developed fever on 10/03 with accelerated weakness and brought to ER for evaluation.   Fever to 101.4 in the ER prompted CTX.  Etiology of fever not clear although "B" symptoms of lymphoma possible but her decline in functional status seemed acute.   Reported abdominal pain and bloating but CT scan without acute pathology.   UA with 14 WBCs, urine cx with Klebsiella - doubt UTI   Blood cx NGTD   Started on empiric Ceftriaxone  CT chest without PNA  WNV serology neg  Quant TB test neg  Repeat RVP still neg   Repeated urine cx neg & blood cx NGTD  Last fever was on 10/05/21   Fevers resolved, but remains weak & deconditioned, interaction remains limited  Empiric Ceftriaxone was stopped on 10/08/21    She was seen by derm on 10/07 for new onset of clean based bilateral buttock & lower sacral area ulcers - started on Valtrex & ulcers were swabbed for HSV & CMV by derm  Wound cx of these ulcers recovered E faecalis - ulcers do not appear to be infected & E faecalis represents colonizing winter   Viral swab is negative for HSV & VZV    Suggest:  Follow blood culture from 10/05 until final neg  No indication for additional empiric antibiotics   Sacral & lower back ulcerations are deeper involving subcutaneous tissue & so I do not suspect a viral pathology / likely decub ulcers - they are not infected & local care alone is recommended  Valtrex for perianal ulcers as per derm  Pt will need to follow up with her primary oncologist to review further mgmt plans at [facility]

## 2021-10-10 NOTE — PROVIDER CONTACT NOTE (OTHER) - RECOMMENDATIONS
Inhaler/nebulizer
Urology? NP states this could be ascites. HARJIT
provider to determine if reswab needed and arrange treatment accordingly .
Notify NP; cooling measures

## 2021-10-10 NOTE — PROGRESS NOTE ADULT - SUBJECTIVE AND OBJECTIVE BOX
CC: f/u for fever - now resolved     Patient reports nothing     REVIEW OF SYSTEMS:  Limited - poorly interactive     Antimicrobials Day #  : 4  valACYclovir 500 milliGRAM(s) Oral two times a day    Other Medications Reviewed    T(F): 98.3 (10-10-21 @ 05:11), Max: 99.5 (10-09-21 @ 21:27)  HR: 91 (10-10-21 @ 05:11)  BP: 131/76 (10-10-21 @ 05:11)  RR: 18 (10-10-21 @ 05:11)  SpO2: 93% (10-10-21 @ 05:11)  Wt(kg): --    PHYSICAL EXAM:  General: alert, no acute distress  Eyes:  anicteric, no conjunctival injection, no discharge  Neck: supple  Lungs: clear to auscultation  Heart: regular rate and rhythm; no murmurs  Abdomen: soft, nondistended, nontenders   Skin: sacral / lower back ulcers are clean without any signs of infection   Extremities: no clubbing, cyanosis, or edema  Neurologic: alert, oriented, moves all extremities    LAB RESULTS:                        11.7   6.89  )-----------( 170      ( 09 Oct 2021 07:18 )             35.9     10-10    131<L>  |  94<L>  |  23  ----------------------------<  127<H>  4.0   |  21<L>  |  1.55<H>    Ca    12.0<H>      10 Oct 2021 07:22  Mg     2.2     10-09    TPro  6.0  /  Alb  3.0<L>  /  TBili  0.4  /  DBili  x   /  AST  90<H>  /  ALT  36  /  AlkPhos  144<H>  10-09    LIVER FUNCTIONS - ( 09 Oct 2021 07:16 )  Alb: 3.0 g/dL / Pro: 6.0 g/dL / ALK PHOS: 144 U/L / ALT: 36 U/L / AST: 90 U/L / GGT: x             MICROBIOLOGY:  RECENT CULTURES:  10-07 @ 16:57 Ear R buttock wound culture Enterococcus faecalis    Moderate Enterococcus faecalis      10-05 @ 21:26 Clean Catch Clean Catch (Midstream)     <10,000 CFU/mL Normal Urogenital Kaylyn      10-05 @ 14:55 .Blood Blood-Peripheral     No growth to date.        RADIOLOGY REVIEWED:

## 2021-10-10 NOTE — PROVIDER CONTACT NOTE (OTHER) - SITUATION
Pt dyspneic and tachypneic
bladder scan showing 892, pt did not void for 8hr.
Pt febrile with a temp of 101.1 F
pt w b/l buttock sores, as per family previously dx w herpes and treated w valacyclovir. Reswab ordered by team, pt family requesting swab not be done as its painful for patient. Paperwork brought in.

## 2021-10-10 NOTE — PROVIDER CONTACT NOTE (OTHER) - ASSESSMENT
Pt appears dyspneic and tachypneic, RR 32. Other VSS, SpO2 96% on room air. Wheezing audible and patient appears uncomfortable. Denies chest pain.
wound assessment at bedside, not PU. medihoney, cavillon and foam in place at this time.
Pt warn to touch upon assessment; Pt is not in distress or discomfort, resting comfortably in bed. VSS, otherwise; see flowsheet.
Pt did not void for 8hr, stomach soft, non -tender. bladder scan ordered, when attempting max urine output was 100cc. RN Efraín at bedside and also attempted. pt repositioned.

## 2021-10-11 LAB
ANION GAP SERPL CALC-SCNC: 15 MMOL/L — SIGNIFICANT CHANGE UP (ref 5–17)
BUN SERPL-MCNC: 30 MG/DL — HIGH (ref 7–23)
CALCIUM SERPL-MCNC: 12.2 MG/DL — HIGH (ref 8.4–10.5)
CALCIUM SERPL-MCNC: 12.4 MG/DL — HIGH (ref 8.4–10.5)
CHLORIDE SERPL-SCNC: 95 MMOL/L — LOW (ref 96–108)
CO2 SERPL-SCNC: 21 MMOL/L — LOW (ref 22–31)
CREAT SERPL-MCNC: 1.55 MG/DL — HIGH (ref 0.5–1.3)
GLUCOSE SERPL-MCNC: 116 MG/DL — HIGH (ref 70–99)
POTASSIUM SERPL-MCNC: 4.4 MMOL/L — SIGNIFICANT CHANGE UP (ref 3.5–5.3)
POTASSIUM SERPL-SCNC: 4.4 MMOL/L — SIGNIFICANT CHANGE UP (ref 3.5–5.3)
PTH-INTACT FLD-MCNC: 8 PG/ML — LOW (ref 15–65)
SARS-COV-2 RNA SPEC QL NAA+PROBE: SIGNIFICANT CHANGE UP
SODIUM SERPL-SCNC: 131 MMOL/L — LOW (ref 135–145)

## 2021-10-11 PROCEDURE — 71045 X-RAY EXAM CHEST 1 VIEW: CPT | Mod: 26

## 2021-10-11 RX ORDER — POLYETHYLENE GLYCOL 3350 17 G/17G
17 POWDER, FOR SOLUTION ORAL DAILY
Refills: 0 | Status: DISCONTINUED | OUTPATIENT
Start: 2021-10-11 | End: 2021-10-15

## 2021-10-11 RX ORDER — VALACYCLOVIR 500 MG/1
500 TABLET, FILM COATED ORAL
Refills: 0 | Status: DISCONTINUED | OUTPATIENT
Start: 2021-10-11 | End: 2021-10-15

## 2021-10-11 RX ORDER — SODIUM CHLORIDE 9 MG/ML
1000 INJECTION INTRAMUSCULAR; INTRAVENOUS; SUBCUTANEOUS
Refills: 0 | Status: DISCONTINUED | OUTPATIENT
Start: 2021-10-11 | End: 2021-10-17

## 2021-10-11 RX ORDER — ACETAMINOPHEN 500 MG
1000 TABLET ORAL ONCE
Refills: 0 | Status: COMPLETED | OUTPATIENT
Start: 2021-10-11 | End: 2021-10-11

## 2021-10-11 RX ORDER — SENNA PLUS 8.6 MG/1
2 TABLET ORAL AT BEDTIME
Refills: 0 | Status: DISCONTINUED | OUTPATIENT
Start: 2021-10-11 | End: 2021-10-15

## 2021-10-11 RX ORDER — ACETAMINOPHEN 500 MG
650 TABLET ORAL EVERY 6 HOURS
Refills: 0 | Status: DISCONTINUED | OUTPATIENT
Start: 2021-10-11 | End: 2021-10-15

## 2021-10-11 RX ORDER — ASCORBIC ACID 60 MG
500 TABLET,CHEWABLE ORAL DAILY
Refills: 0 | Status: DISCONTINUED | OUTPATIENT
Start: 2021-10-11 | End: 2021-10-15

## 2021-10-11 RX ADMIN — Medication 400 MILLIGRAM(S): at 14:00

## 2021-10-11 RX ADMIN — PERPHENAZINE 2 MILLIGRAM(S): 8 TABLET, FILM COATED ORAL at 23:32

## 2021-10-11 RX ADMIN — LIDOCAINE 1 PATCH: 4 CREAM TOPICAL at 06:40

## 2021-10-11 RX ADMIN — Medication 2 MILLIGRAM(S): at 06:26

## 2021-10-11 RX ADMIN — HEPARIN SODIUM 5000 UNIT(S): 5000 INJECTION INTRAVENOUS; SUBCUTANEOUS at 23:02

## 2021-10-11 RX ADMIN — POLYETHYLENE GLYCOL 3350 17 GRAM(S): 17 POWDER, FOR SOLUTION ORAL at 06:27

## 2021-10-11 RX ADMIN — Medication 3 MILLILITER(S): at 12:24

## 2021-10-11 RX ADMIN — SENNA PLUS 2 TABLET(S): 8.6 TABLET ORAL at 23:02

## 2021-10-11 RX ADMIN — Medication 10 MILLIGRAM(S): at 23:02

## 2021-10-11 RX ADMIN — HEPARIN SODIUM 5000 UNIT(S): 5000 INJECTION INTRAVENOUS; SUBCUTANEOUS at 12:24

## 2021-10-11 RX ADMIN — HEPARIN SODIUM 5000 UNIT(S): 5000 INJECTION INTRAVENOUS; SUBCUTANEOUS at 06:27

## 2021-10-11 NOTE — PROGRESS NOTE ADULT - ASSESSMENT
Patient is a 80 PMHx of new onset CLL, with possible staging of ovarian cancer, HTN, HLD, CC s/p fall and fevers.  Per daughter, pt lives at home alone, fell two times while getting out of bed. States she fell onto both hands and knees while rolling out of bed. No LOC.  Denied any lightheadedness, dizziness at the time of fall.  Was brought in today due to having a fever. PT complaining of SOB as well, and has pain in both knees. Denies cough, headache, chest pain, nausea, vomiting. Has not started any chemotherapy per daughter. Has renal failure, denied h/o renal failure      A/P:  HARJIT  Baseline Scr unclear  HARJIT possibly sec to contrast s/p 10/4   Renal function was improving but worsened and now stable today  Has high ca as well, possibly dehydrated  lasix on hold-yesterday was last dose  FEUrea 30-suggest pre-renal state  Monitor renal function at present  NS 50cc/hr for 1L  Avoid nephrotoxics, NSAIds RCA    Hypercalcemia:  Etiology?  dehydration Vs sec to malignancy  IVF as above  Oncology follow up    Hyponatremia  Initial Urine lytes suggestive of dehydration   Repeat Urine lytes suggestive of SIADH  Sodium worsened today  Monitor Na level daily   Free water restriction 1l/day                    Continue  antibiotics in  NS instead of D5     HTN:  not on antihypertensive meds  monitor at present     Proteinuria/Hematuria  in setting of UTI  Repeat UA post tx   Ct A/P with no renal mass

## 2021-10-11 NOTE — PROGRESS NOTE ADULT - SUBJECTIVE AND OBJECTIVE BOX
Neurology Progress Note    S: Patient seen and examined. resting in bed. doing okay.      Medication:  MEDICATIONS  (STANDING):  amitriptyline 10 milliGRAM(s) Oral at bedtime  ascorbic acid 500 milliGRAM(s) Oral daily  heparin   Injectable 5000 Unit(s) SubCutaneous every 8 hours  influenza   Vaccine 0.5 milliLiter(s) IntraMuscular once  lidocaine   4% Patch 1 Patch Transdermal every 24 hours  LORazepam     Tablet 2 milliGRAM(s) Oral two times a day  multivitamin 1 Tablet(s) Oral daily  perphenazine 2 milliGRAM(s) Oral at bedtime  polyethylene glycol 3350 17 Gram(s) Oral two times a day  senna 2 Tablet(s) Oral at bedtime  sodium chloride 0.9%. 1000 milliLiter(s) (50 mL/Hr) IV Continuous <Continuous>  valACYclovir 500 milliGRAM(s) Oral two times a day    MEDICATIONS  (PRN):  acetaminophen   Tablet .. 650 milliGRAM(s) Oral every 6 hours PRN Temp greater or equal to 38C (100.4F)  acetaminophen   Tablet .. 650 milliGRAM(s) Oral every 6 hours PRN Mild Pain (1 - 3), Moderate Pain (4 - 6)  albuterol/ipratropium for Nebulization 3 milliLiter(s) Nebulizer every 6 hours PRN Shortness of Breath and/or Wheezing      Vitals:  Vital Signs Last 24 Hrs  T(C): 37.4 (10-11-21 @ 08:42), Max: 37.4 (10-10-21 @ 21:32)  T(F): 99.4 (10-11-21 @ 08:42), Max: 99.4 (10-11-21 @ 08:42)  HR: 58 (10-11-21 @ 08:42) (58 - 109)  BP: 134/72 (10-11-21 @ 08:42) (123/77 - 152/82)  BP(mean): --  RR: 18 (10-11-21 @ 08:42) (17 - 18)  SpO2: 94% (10-11-21 @ 08:42) (92% - 94%)            General Exam:   General Appearance: Appropriately dressed and in no acute distress       Head: Normocephalic, atraumatic and no dysmorphic features  Ear, Nose, and Throat: Moist mucous membranes  CVS: S1S2+  Resp: No SOB, no wheeze or rhonchi  GI: soft NT/ND  Extremities: No edema or cyanosis  Skin: No bruises or rashes     Neurological Exam:  Mental Status: Awake, alert and oriented x 2.  Able to follow simple and complex verbal commands. Able to name and repeat. fluent speech. No obvious aphasia or dysarthria noted. masked facies. + frontal release sigbns   Cranial Nerves: PERRL, EOMI, VFFC, sensation V1-V3 intact,  L facial asymmetry, equal elevation of palate, scm/trap 5/5, tongue is midline on protrusion. no obvious papilledema on fundoscopic exam. hearing is grossly intact.   Motor:HASTINGS but + tremor L>R moves Uppers>lowers   Sensation: Intact to light touch and pinprick throughout. no right/left confusion. no extinction to tactile on DSS.   Reflexes: 1+ throughout at biceps, brachioradialis, triceps, patellars and ankles bilaterally and equal. No clonus. R toe and L toe were both downgoing.  Coordination: no dysmetria FNF  Gait: deferred   I personally reviewed the below data/images/labs:      10-11    131<L>  |  95<L>  |  30<H>  ----------------------------<  116<H>  4.4   |  21<L>  |  1.55<H>    Ca    12.4<H>      11 Oct 2021 07:42    Urinalysis Basic - ( 05 Oct 2021 17:05 )    Color: Yellow / Appearance: Clear / S.033 / pH: x  Gluc: x / Ketone: Negative  / Bili: Negative / Urobili: Negative   Blood: x / Protein: 100 / Nitrite: Negative   Leuk Esterase: Negative / RBC: 2 /hpf / WBC 3 /HPF   Sq Epi: x / Non Sq Epi: 2 /hpf / Bacteria: Negative      < from: CT Head No Cont (10.04.21 @ 09:07) >      EXAM:  CT CERVICAL SPINE                          EXAM:  CT BRAIN                                PROCEDURE DATE:  10/04/2021            INTERPRETATION:  CLINICAL STATEMENT: Trauma..    TECHNIQUE: CT of the head and cervical spine were performed withoutIV contrast. 3-D/MIP images obtained    COMPARISON: None.    FINDINGS:  Head:  There is mild diffuse parenchymal volume loss. There are areas of low attenuation in the periventricular white matter likely related to mild chronic microvascular ischemicchanges.    There is no acute intracranial hemorrhage, parenchymal mass, mass effect or midline shift. There is no acute territorial infarct. There is no hydrocephalus. Partial empty sella    The cranium is intact.    Mild mucosal thickening right maxillary sinus    Cervical spine:  Vertebral body heights intact. Straightening of the cervical lordosis. Grade 1 retrolisthesis C3 on C4. Grade 1 anterolisthesis of C5 on C6    There is no prevertebral soft tissue swelling. The odontoid is intact.    Evaluation of the spinal canal is limited on a CT exam.  Multilevel degenerative changes noted resulting in multilevel spinal canal stenosis and neural foraminal narrowing.    Partially visualized cervical lymphadenopathy noted with multiple enlarged lymph nodes. Calcifications noted within the thyroid gland.    IMPRESSION:  No acute intracranial hemorrhage.    No acute fracture cervical spine. If pain persists, follow-up MRI exam recommended    Partially visualized cervical lymphadenopathy suspicious for lymphoma or metastatic disease. Correlate clinically    --- End of Report ---                ROBI RUTH MD; Attending Radiologist  This document has been electronically signed. Oct  4 2021  9:22AM    < end of copied text >      < from: CT Chest No Cont (10.04.21 @ 20:17) >    EXAM:  CT CHEST                            PROCEDURE DATE:  10/04/2021            INTERPRETATION:  CLINICAL INFORMATION: Shortness of breath, evaluate for pneumonia    COMPARISON: CT abdomen and pelvis 10/4/2021    CONTRAST/COMPLICATIONS:  IV Contrast: None  Oral Contrast: None  Complications: No immediate complication    PROCEDURE:  CT of the Chest was performed.  Sagittal and coronal reformats were performed.    FINDINGS:    LUNGS AND AIRWAYS: Patent central airways.  Calcified nodule abutting the left anterior pleura (series 4, image 57). 3 mm nodule in the left upper lobe (series 4, image 39). Additional 2 mm nodule in the left upper lobe (series 4, image 58).  PLEURA: No pleural effusion.  MEDIASTINUM AND ERICA: Multiple enlarged supraclavicular lymph nodes. Additional axillary and mediastinal lymphadenopathy is seen.  VESSELS: Within normal limits.  HEART: Heart size is normal. No pericardial effusion.  CHEST WALL AND LOWER NECK: Calcifications within the bilateral thyroid lobes.  VISUALIZED UPPER ABDOMEN: Please refer to prior CT abdomen/pelvis.  BONES: Degenerative changes of the spine.    IMPRESSION:  Lymphadenoapthy described above is consistent with CLL as per patient history.    No CT evidence for pneumonia.                --- End of Report ---              KADEEM DAVOUDZADEH MD; Resident Radiology  This document has been electronically signed.  REYMUNDO VARGAS MD; Attending Radiologist  This document has been electronically signed. Oct  5 2021 12:09PM    < end of copied text >  < from: CT Abdomen and Pelvis w/ IV Cont (10.04.21 @ 09:30) >    EXAM:  CT ABDOMEN AND PELVIS IC                            PROCEDURE DATE:  10/04/2021            INTERPRETATION:  CLINICAL INFORMATION: Left lower quadrant pain    COMPARISON: None.    CONTRAST/COMPLICATIONS:  IV Contrast: Omnipaque 350  90 cc administered   10 cc discarded  Oral Contrast: None  Complications: None    PROCEDURE:  CT of the Abdomen and Pelvis was performed.  Sagittal and coronal reformats were performed.    FINDINGS:  LOWER CHEST: Bibasilar atelectasis. Partially imaged enlarged right axillary lymph nodes.    LIVER: Within normal limits.  BILE DUCTS: Normal caliber.  GALLBLADDER: Within normal limits.  SPLEEN: Within normal limits.  PANCREAS: Fatty atrophy of the pancreas without cutoff. No ductal dilatation or cutoff.  ADRENALS: Within normal limits.  KIDNEYS/URETERS: Within normal limits.    BLADDER/REPRODUCTIVE ORGANS: Multiloculated right adnexal cystic mass measuring 14.6 x 9.0 cm, which abuts the appendix. Left adnexal cyst measures 2.1 x 1.4 cm. Uterus is unremarkable.    BOWEL: No bowel obstruction. Appendix is normal. Colonic diverticulosis.  PERITONEUM: 2 small nodules are present adjacent to the cystic lesion measuring 4 and 6 mm in diameter, respectively (2:75 and 79), possibly peritoneal nodules or small pericolonic lymph nodes.  VESSELS: Within normal limits.  RETROPERITONEUM/LYMPH NODES: Enlarged bilateral inguinal, iliac, mesenteric, and retroperitoneal lymph nodes.  *  Reference left inguinal lymph node measures up to 4.0 x 2.5 cm (2:93).  *  Reference left periaortic lymph node measures up to 2.5 x 2.3 cm (2:45).  ABDOMINAL WALL: Within normal limits.  BONES: Degenerative changes. Lumbar dextroscoliosis.    IMPRESSION:  Enlarged bilateral inguinal, iliac, mesenteric, and retroperitoneal lymph nodes. Partially imaged enlarged right axillary lymph nodes. Primary consideration is lymphoma. Metastatic disease is not excluded. Dedicated chest imaging is recommended for full evaluation.    Cystic right adnexal mass measuring 14.6 x 9.0 cm, concerning for epithelial neoplasm.      --- End of Report ---      WALTER ALANIS MD; Resident Interventional Radiology  This document has been electronically signed.  DAREN MOHAN MD; Attending Radiologist  This document has been electronically signed. Oct  4 2021 11:14AM    < end of copied text >

## 2021-10-11 NOTE — PROGRESS NOTE ADULT - ASSESSMENT
Patient is a 80 PMHx of new onset CLL, with possible staging of ovarian cancer, HTN, HLD, CC s/p fall and fevers.  Per daughter, pt lives at home alone, fell two times while getting out of bed. States she fell onto both hands and knees while rolling out of bed. No LOC.  Denied any lightheadedness, dizziness at the time of fall.  Currently states she has some dizziness, like she would fall over if she had to walk. Was brought in today due to having a fever. PT complaining of SOB as well, and has pain in both knees. Denies cough, headache, chest pain, nausea, vomiting. Has not started any chemotherapy per daughter    # AMS and dizziness   CT head noted  fall precautions  orthostatics  Neurology eval appreciated follow up recs  -As per neuro brain MRI w/ and w/o if in agreement with GOC     # Sepsis  R/O sepsis   UTI vs tumor fever vs other etiologies   Pan cx   Completed course of Rocephin, stopped on 10/08 as per ID  monitor clinically   ID eval appreciated   Hydration as tolerated  Pan CT noted      # HARJIT   HARJIT will monitor renal function  -Elevated calcium, as per renal will hold Lasix for today due to likely dehydration, repeat BMP tomorrow, and limit fluids to 50cc/hr for 1L  Monitor renal function  I and Os   As per nephrology, monitor renal function and avoid nephrotoxic   -As per renal, monitor Na levels, free water restriction to 1L/day      PTH level and Calcium levels noted. Will get endo consult.       # Ovarian Ca  with mets  seen on CT   onc follow up   Onc eval called, patient follows with [facility], outpatient follow up     # HTN   resume BP meds   adjust as tolerated     # HLD   lipid panel     #Wheezing, fluid overload likely   Cont Duonebs  D/C IVF  will adjust diuresis  BNP results noted. Will follow  Echo ordered, awaiting the results  Card eval appreciated     DVT and GI PPX     Pending d/c to rehab    Patient is a 80 PMHx of new onset CLL, with possible staging of ovarian cancer, HTN, HLD, CC s/p fall and fevers.  Per daughter, pt lives at home alone, fell two times while getting out of bed. States she fell onto both hands and knees while rolling out of bed. No LOC.  Denied any lightheadedness, dizziness at the time of fall.  Currently states she has some dizziness, like she would fall over if she had to walk. Was brought in today due to having a fever. PT complaining of SOB as well, and has pain in both knees. Denies cough, headache, chest pain, nausea, vomiting. Has not started any chemotherapy per daughter    # AMS and dizziness   CT head noted  fall precautions  orthostatics  Neurology eval appreciated follow up recs  -As per neuro brain MRI w/ and w/o if in agreement with GOC     # Sepsis  R/O sepsis   UTI vs tumor fever vs other etiologies   Pan cx   Completed course of Rocephin, stopped on 10/08 as per ID  monitor clinically   ID eval appreciated   Hydration as tolerated  Pan CT noted      # HARJIT   HARJIT will monitor renal function  -Elevated calcium, as per renal will hold Lasix for today due to likely dehydration, repeat BMP tomorrow, and limit fluids to 50cc/hr for 1L  Monitor renal function  I and Os   As per nephrology, monitor renal function and avoid nephrotoxic   -As per renal, monitor Na levels, free water restriction to 1L/day      PTH level and Calcium levels noted. Will get endo consult.       # Ovarian Ca  with mets  seen on CT   onc follow up   Onc eval called, patient follows with [facility], outpatient follow up     # HTN   No on an antihypertensive medication   Monitor BP      # HLD   lipid panel     #Wheezing, fluid overload likely   Cont Duonebs  D/C IVF  will adjust diuresis  BNP results noted. Will follow  Echo ordered, awaiting the results  Card eval appreciated     DVT and GI PPX     Pending d/c to rehab    Patient is a 80 PMHx of new onset CLL, with possible staging of ovarian cancer, HTN, HLD, CC s/p fall and fevers.  Per daughter, pt lives at home alone, fell two times while getting out of bed. States she fell onto both hands and knees while rolling out of bed. No LOC.  Denied any lightheadedness, dizziness at the time of fall.  Currently states she has some dizziness, like she would fall over if she had to walk. Was brought in today due to having a fever. PT complaining of SOB as well, and has pain in both knees. Denies cough, headache, chest pain, nausea, vomiting. Has not started any chemotherapy per daughter    # AMS and dizziness   CT head noted  fall precautions  orthostatics  Neurology eval appreciated follow up recs  -As per neuro brain MRI w/ and w/o if in agreement with GOC     # Sepsis  R/O sepsis   UTI vs tumor fever vs other etiologies   Pan cx   Completed course of Rocephin, stopped on 10/08 as per ID  monitor clinically   ID eval appreciated   Hydration as tolerated  Pan CT noted      # HARJIT   HARJIT will monitor renal function  -Elevated calcium, as per renal will hold Lasix for today due to likely dehydration, repeat BMP tomorrow, and limit fluids to 50cc/hr for 1L  Monitor renal function  I and Os   As per nephrology, monitor renal function and avoid nephrotoxic   -As per renal, monitor Na levels, free water restriction to 1L/day  - ENdo eval       PTH level and Calcium levels noted. Will get endo consult.       # Ovarian Ca, as per GYN/Onc there is no ovarian CA   likely due to her CLL   seen on CT   onc follow up   Patient follows with [facility], outpatient follow up     # HTN   No on an antihypertensive medication   Monitor BP      # HLD   lipid panel     #Wheezing, fluid overload likely   Cont Duonebs  D/C IVF  will adjust diuresis, on hold for now   BNP results noted. Will follow  Echo ordered, awaiting the results  Card eval appreciated     DVT and GI PPX

## 2021-10-11 NOTE — PROGRESS NOTE ADULT - ASSESSMENT
81yo F with  new onset CLL, with possible staging of ovarian cancer, HTN, HLD, RA, anxiety, s/p fall and fevers.   AMS likely 2/2 infection.  dizziness better + UTI  CTH and CT c spien unremarkable   CT C A P  LDL 44, A1c 5.2  b12 WNL  - ceftriaxone for infection /UTI now off.   - heme/onc for CLL. recs appreciated   - HARJIT, IVF  - MRI brain w/ and w/o if in agreement with GOC  - check orthostatics   - PT/OT/SS/SLP, OOBC  - check FS, glucose control <180  - GI/DVT ppx  - Counseling on diet, exercise, and medication adherence was done  - Counseling on smoking cessation and alcohol consumption offered when appropriate.  - Pain assessed and judicious use of narcotics when appropriate was discussed.    - Stroke education given when appropriate.  - Importance of fall prevention discussed.   - Differential diagnosis and plan of care discussed with patient and/or family and primary team  - Thank you for allowing me to participate in the care of this patient. Call with questions.   - d/c planning. family doesn't want RA now agreed for RA Thompson MD  Vascular Neurology  Office: [phone]

## 2021-10-11 NOTE — CHART NOTE - NSCHARTNOTEFT_GEN_A_CORE
Nutrition Follow Up Note  Patient seen for: malnutrition follow-up    Chart reviewed, events noted.    Source: [] Patient       [x] EMR        [x] RN        [x] Family at bedside - grandson      [] Other:    -If unable to interview patient: [] Trach/Vent/BiPAP  [x] Disoriented/confused/inappropriate to interview    Diet Order:   Diet, Soft:   1000mL Fluid Restriction ([id number])  Supplement Feeding Modality:  Oral  Ensure Enlive Cans or Servings Per Day:  2       Frequency:  Daily (10-09-21)    - Is current order appropriate/adequate? [] Yes  [x]  No:     - PO intake :   [] >75%  Adequate    [] 50-75%  Fair       [x] <50%  Poor    - Nutrition-related concerns: RN reports pt was pocketing food today and is at risk for aspiration- swallow evaluation pending- consult placed last night. Grandson at bedside states pt took 1 Ensure yesterday morning.  - hypercalcemia, endo consult pending.     GI:  Last BM 10/9   Bowel Regimen? [x] Yes   [] No      Weights:   Daily Weight in k.5 (10-06)    Nutritionally Pertinent MEDICATIONS  (STANDING):  ascorbic acid  multivitamin  polyethylene glycol 3350  senna  sodium chloride 0.9%.  sodium chloride 0.9%.  valACYclovir    Pertinent Labs: 10-11 @ 07:42: Na 131<L>, BUN 30<H>, Cr 1.55<H>, <H>, K+ 4.4, Phos --, Mg --, Alk Phos --, ALT/SGPT --, AST/SGOT --, HbA1c --    A1C with Estimated Average Glucose Result: 5.2 % (10-05-21 @ 16:04)    Finger Sticks:      Skin per nursing documentation: stage III pressure injury to sacrum, buttocks  Edema: 1+ generalized, 2+ b/l legs    Estimated Needs:   [x] no change since previous assessment  [] recalculated:     Previous Nutrition Diagnosis: increased nutrient needs, moderate chronic malnutrition  Nutrition Diagnosis is: [x] ongoing  [] resolved [] not applicable     New Nutrition Diagnosis: [x] Not applicable    Nutrition Care Plan:  [x] In Progress  [] Achieved  [] Not applicable    Nutrition Interventions:       Recommendations:         [x] Continue current diet order- texture recommendations per SLP. Continue to monitor PO intake and need for possible NGT if poor PO intake/lethargy persists, and after results of swallow eval.          - Consider addition of D5 while pt with minimal PO intake     [x] Continue current micronutrient supplementation: Multivitamin        Monitoring and Evaluation:   Continue to monitor nutritional intake, tolerance to diet prescription, weights, labs, skin integrity    Jenifer Walsh RD, CDN. Pager: [phone]   RD remains available upon request and will follow up per protocol Nutrition Follow Up Note  Patient seen for: malnutrition follow-up    Chart reviewed, events noted.    Source: [] Patient       [x] EMR        [x] RN        [x] Family at bedside - grandson      [] Other:    -If unable to interview patient: [] Trach/Vent/BiPAP  [x] Disoriented/confused/inappropriate to interview    Diet Order:   Diet, Soft:   1000mL Fluid Restriction ([id number])  Supplement Feeding Modality:  Oral  Ensure Enlive Cans or Servings Per Day:  2       Frequency:  Daily (10-09-21)    - Is current order appropriate/adequate? [] Yes  [x]  No:     - PO intake :   [] >75%  Adequate    [] 50-75%  Fair       [x] <50%  Poor    - Nutrition-related concerns: RN reports pt was pocketing food today and is at risk for aspiration- swallow evaluation pending- consult placed last night. Grandson at bedside states pt took 1 Ensure yesterday morning.  - hypercalcemia, endo consult pending.     GI:  Last BM 10/9   Bowel Regimen? [x] Yes   [] No      Weights:   Daily Weight in k.5 (10-06)    Nutritionally Pertinent MEDICATIONS  (STANDING):  ascorbic acid  multivitamin  polyethylene glycol 3350  senna  sodium chloride 0.9%.  sodium chloride 0.9%.  valACYclovir    Pertinent Labs: 10-11 @ 07:42: Na 131<L>, BUN 30<H>, Cr 1.55<H>, <H>, K+ 4.4, Phos --, Mg --, Alk Phos --, ALT/SGPT --, AST/SGOT --, HbA1c --    A1C with Estimated Average Glucose Result: 5.2 % (10-05-21 @ 16:04)    Finger Sticks:      Skin per nursing documentation: stage III pressure injury to sacrum, buttocks  Edema: 1+ generalized, 2+ b/l legs    Estimated Needs:   [x] no change since previous assessment  [] recalculated:     Previous Nutrition Diagnosis: increased nutrient needs, moderate chronic malnutrition  Nutrition Diagnosis is: [x] ongoing  [] resolved [] not applicable     New Nutrition Diagnosis: [x] Not applicable    Nutrition Care Plan:  [x] In Progress  [] Achieved  [] Not applicable    Nutrition Interventions:       Recommendations:         [x] Continue current diet order- texture recommendations per SLP. Continue to monitor PO intake and need for possible NGT if poor PO intake/lethargy persists, and after results of swallow eval.          - Consider addition of D5 while pt with minimal PO intake     [x] Continue current micronutrient supplementation: Multivitamin + Vitamin C        Monitoring and Evaluation:   Continue to monitor nutritional intake, tolerance to diet prescription, weights, labs, skin integrity    Jenifer Walsh RD, CDN. Pager: [phone]   RD remains available upon request and will follow up per protocol

## 2021-10-11 NOTE — PROGRESS NOTE ADULT - SUBJECTIVE AND OBJECTIVE BOX
Dr. Lowe  Office [phone]  Cell [phone]  Tanika SAMS  Cell [phone]    RENAL PROGRESS NOTE: DATE OF SERVICE 10-11-21 @ 13:42    Patient is a 80y old  Female who presents with a chief complaint of AMS (11 Oct 2021 10:38)      Patient seen and examined at bedside. No chest pain/sob    VITALS:  T(F): 99.4 (10-11-21 @ 08:42), Max: 99.4 (10-11-21 @ 08:42)  HR: 58 (10-11-21 @ 08:42)  BP: 134/72 (10-11-21 @ 08:42)  RR: 18 (10-11-21 @ 08:42)  SpO2: 94% (10-11-21 @ 08:42)  Wt(kg): --    10-10 @ 07:01  -  10-11 @ 07:00  --------------------------------------------------------  IN: 1150 mL / OUT: 600 mL / NET: 550 mL    10-11 @ 07:01  -  10-11 @ 13:42  --------------------------------------------------------  IN: 0 mL / OUT: 150 mL / NET: -150 mL          PHYSICAL EXAM:  Constitutional: NAD  Neck: No JVD  Respiratory: CTAB, no wheezes, rales or rhonchi  Cardiovascular: S1, S2, RRR  Gastrointestinal: BS+, soft, NT/ND  Extremities: No peripheral edema    Hospital Medications:   MEDICATIONS  (STANDING):  amitriptyline 10 milliGRAM(s) Oral at bedtime  ascorbic acid 500 milliGRAM(s) Oral daily  heparin   Injectable 5000 Unit(s) SubCutaneous every 8 hours  influenza   Vaccine 0.5 milliLiter(s) IntraMuscular once  lidocaine   4% Patch 1 Patch Transdermal every 24 hours  LORazepam     Tablet 2 milliGRAM(s) Oral two times a day  multivitamin 1 Tablet(s) Oral daily  perphenazine 2 milliGRAM(s) Oral at bedtime  polyethylene glycol 3350 17 Gram(s) Oral two times a day  senna 2 Tablet(s) Oral at bedtime  sodium chloride 0.9%. 1000 milliLiter(s) (50 mL/Hr) IV Continuous <Continuous>  valACYclovir 500 milliGRAM(s) Oral two times a day      LABS:  10-11    131<L>  |  95<L>  |  30<H>  ----------------------------<  116<H>  4.4   |  21<L>  |  1.55<H>    Ca    12.4<H>      11 Oct 2021 07:42      Creatinine Trend: 1.55 <--, 1.55 <--, 1.55 <--, 1.31 <--, 1.20 <--, 1.28 <--, 1.52 <--      [id number]  intact pth8  parathyroid hormone intact, serum--        Urine Studies:  Urinalysis - [10-05-21 @ 17:05]      Color Yellow / Appearance Clear / SG 1.033 / pH 6.0      Gluc Negative / Ketone Negative  / Bili Negative / Urobili Negative       Blood Moderate / Protein 100 / Leuk Est Negative / Nitrite Negative      RBC 2 / WBC 3 / Hyaline 1 / Gran  / Sq Epi  / Non Sq Epi 2 / Bacteria Negative    Urine Creatinine 79      [10-10-21 @ 17:00]  Urine Sodium 60      [10-07-21 @ 12:53]  Urine Urea Nitrogen 357      [10-10-21 @ 21:55]  Urine Osmolality 474      [10-07-21 @ 12:53]    PTH -- (Ca 12.2)      [10-11-21 @ 08:51]   8  TSH 1.15      [10-05-21 @ 16:30]  Lipid: chol 87, , HDL 15, LDL --      [10-05-21 @ 16:30]      Free Light Chains: kappa 22.16, lambda 2.82, ratio = 7.86      [10-07 @ 17:52]    RADIOLOGY & ADDITIONAL STUDIES:

## 2021-10-11 NOTE — PROGRESS NOTE ADULT - SUBJECTIVE AND OBJECTIVE BOX
Name of Patient : PREM KAY  MRN: [id number]  Date of visit: 10-11-21 @ 13:41      Subjective: Patient seen and examined. No new events except as noted.     REVIEW OF SYSTEMS:    MEDICATIONS:  MEDICATIONS  (STANDING):  amitriptyline 10 milliGRAM(s) Oral at bedtime  ascorbic acid 500 milliGRAM(s) Oral daily  heparin   Injectable 5000 Unit(s) SubCutaneous every 8 hours  influenza   Vaccine 0.5 milliLiter(s) IntraMuscular once  lidocaine   4% Patch 1 Patch Transdermal every 24 hours  LORazepam     Tablet 2 milliGRAM(s) Oral two times a day  multivitamin 1 Tablet(s) Oral daily  perphenazine 2 milliGRAM(s) Oral at bedtime  polyethylene glycol 3350 17 Gram(s) Oral two times a day  senna 2 Tablet(s) Oral at bedtime  sodium chloride 0.9%. 1000 milliLiter(s) (50 mL/Hr) IV Continuous <Continuous>  valACYclovir 500 milliGRAM(s) Oral two times a day      PHYSICAL EXAM:  T(C): 37.4 (10-11-21 @ 08:42), Max: 37.4 (10-10-21 @ 21:32)  HR: 58 (10-11-21 @ 08:42) (58 - 109)  BP: 134/72 (10-11-21 @ 08:42) (123/77 - 152/82)  RR: 18 (10-11-21 @ 08:42) (17 - 18)  SpO2: 94% (10-11-21 @ 08:42) (92% - 94%)  Wt(kg): --  I&O's Summary    10 Oct 2021 07:01  -  11 Oct 2021 07:00  --------------------------------------------------------  IN: 1150 mL / OUT: 600 mL / NET: 550 mL    11 Oct 2021 07:01  -  11 Oct 2021 13:41  --------------------------------------------------------  IN: 0 mL / OUT: 150 mL / NET: -150 mL          Appearance: Calm, awake   HEENT:  PERRLA   Lymphatic: No lymphadenopathy   Cardiovascular: Normal S1 S2, no JVD  Respiratory: normal effort , clear  Gastrointestinal:  Soft, Non-tender  Skin: No rashes,  warm to touch  Psychiatry:  Mood & affect appropriate  Musculuskeletal: No edema      All labs, Imaging and EKGs personally reviewed         10-10-21 @ 07:01  -  10-11-21 @ 07:00  --------------------------------------------------------  IN: 1150 mL / OUT: 600 mL / NET: 550 mL    10-11-21 @ 07:01  -  10-11-21 @ 13:41  --------------------------------------------------------  IN: 0 mL / OUT: 150 mL / NET: -150 mL                10-11    131<L>  |  95<L>  |  30<H>  ----------------------------<  116<H>  4.4   |  21<L>  |  1.55<H>    Ca    12.4<H>      11 Oct 2021 07:42

## 2021-10-11 NOTE — PROGRESS NOTE ADULT - SUBJECTIVE AND OBJECTIVE BOX
DATE OF SERVICE: 10-11-21      Patient is a 80y old  Female who presents with a chief complaint of AMS (11 Oct 2021 10:38)      INTERVAL HISTORY: feels ok    PHYSICAL EXAM:  T(C): 37.3 (10-11-21 @ 21:08), Max: 39.3 (10-11-21 @ 13:45)  HR: 90 (10-11-21 @ 21:08) (58 - 100)  BP: 137/71 (10-11-21 @ 21:08) (133/82 - 152/82)  RR: 22 (10-11-21 @ 21:08) (18 - 22)  SpO2: 92% (10-11-21 @ 21:08) (92% - 94%)  Wt(kg): --  I&O's Summary    10 Oct 2021 07:01  -  11 Oct 2021 07:00  --------------------------------------------------------  IN: 1150 mL / OUT: 600 mL / NET: 550 mL    11 Oct 2021 07:01  -  11 Oct 2021 22:32  --------------------------------------------------------  IN: 0 mL / OUT: 350 mL / NET: -350 mL          Appearance: In no distress	  HEENT:    PERRL, EOMI	  Cardiovascular:  S1 S2, No JVD  Respiratory: Lungs clear to auscultation	  Gastrointestinal:  Soft, Non-tender, + BS	  Vascularature:  No edema of LE  Psychiatric: Appropriate affect   Neuro: no acute focal deficits           10-11    131<L>  |  95<L>  |  30<H>  ----------------------------<  116<H>  4.4   |  21<L>  |  1.55<H>    Ca    12.4<H>      11 Oct 2021 07:42          Labs personally reviewed      ASSESSMENT/PLAN: 	  Patient is a 80 PMHx of new onset CLL, with possible staging of ovarian cancer, HTN, HLD, CC s/p fall and fevers.  Per daughter, pt lives at home alone, fell two times while getting out of bed. States she fell onto both hands and knees while rolling out of bed. No LOC.  Denied any lightheadedness, dizziness at the time of fall.  Currently states she has some dizziness, like she would fall over if she had to walk. Was brought in today due to having a fever. PT complaining of SOB as well, and has pain in both knees. Denies cough, headache, chest pain, nausea, vomiting. Has not started any chemotherapy per daughter    # AMS and dizziness   check orthostatics  Neurology eval recommends MRI of brain- pending [facility] discussion  - unlikely cardiac     # SOB  d/pablito IVF as CXR with mild pulm vasc congestion  - Recurrent SOB again this am  - Lasix 40mg IV daily, close monitor Cr given recent HARJIT    # HARJIT   -per nephro:  Baseline Scr unclear  HARJIT possibly sec to contrast s/p 10/4   Renal function was improving but worsened today  Has high ca as well, possibly dehydrated  Hold lasix for a day-received today's dose   Monitor renal function at present  Avoid nephrotoxics, NSAIds RCA    # HTN   continue BP meds  adjust as tolerated     # HLD   lipid panel normal except HDL 15        Dallas Monaco DO [facility]  Cardiovascular Medicine  [address], [address]  Office: [phone]  Cell: [phone]

## 2021-10-11 NOTE — PROGRESS NOTE ADULT - SUBJECTIVE AND OBJECTIVE BOX
CC: f/u for fever    Patient reports; she is comfortable in bed, no acute complaints    REVIEW OF SYSTEMS:  All other review of systems negative (Comprehensive ROS)    Antimicrobials Day #  :  valACYclovir 500 milliGRAM(s) Oral two times a day    Other Medications Reviewed  MEDICATIONS  (STANDING):  amitriptyline 10 milliGRAM(s) Oral at bedtime  ascorbic acid 500 milliGRAM(s) Oral daily  heparin   Injectable 5000 Unit(s) SubCutaneous every 8 hours  influenza   Vaccine 0.5 milliLiter(s) IntraMuscular once  lidocaine   4% Patch 1 Patch Transdermal every 24 hours  LORazepam     Tablet 2 milliGRAM(s) Oral two times a day  multivitamin 1 Tablet(s) Oral daily  perphenazine 2 milliGRAM(s) Oral at bedtime  polyethylene glycol 3350 17 Gram(s) Oral two times a day  senna 2 Tablet(s) Oral at bedtime  sodium chloride 0.9%. 1000 milliLiter(s) (50 mL/Hr) IV Continuous <Continuous>  valACYclovir 500 milliGRAM(s) Oral two times a day    T(F): 98 (10-11-21 @ 05:43), Max: 99.3 (10-10-21 @ 21:32)  HR: 98 (10-11-21 @ 05:43)  BP: 152/82 (10-11-21 @ 05:43)  RR: 18 (10-11-21 @ 05:43)  SpO2: 92% (10-11-21 @ 05:43)  Wt(kg): --    PHYSICAL EXAM:  General: sleepy, no acute distress  Eyes:  anicteric, no conjunctival injection, no discharge  Oropharynx: no lesions or injection 	  Neck: supple, without adenopathy  Lungs: clear to auscultation  Heart: regular rate and rhythm; no murmur, rubs or gallops  Abdomen: soft, nondistended, nontender, without mass or organomegaly  Skin: no lesions  Extremities: no clubbing, cyanosis, or edema  Neurologic: alert, oriented, moves all extremities    LAB RESULTS:    10-10    131<L>  |  94<L>  |  23  ----------------------------<  127<H>  4.0   |  21<L>  |  1.55<H>    Ca    12.0<H>      10 Oct 2021 07:22          MICROBIOLOGY:  RECENT CULTURES:  10-07 @ 16:57 Ear R buttock woudn culture Enterococcus faecalis    Moderate Enterococcus faecalis          RADIOLOGY REVIEWED:    < from: VA Duplex Lower Ext Vein Scan, Gume (10.08.21 @ 11:44) >  IMPRESSION:  No evidence of deep venous thrombosis in either lower extremity.    < end of copied text >

## 2021-10-11 NOTE — CHART NOTE - NSCHARTNOTEFT_GEN_A_CORE
Patient is a 80y old  Female who presents with a chief complaint of AMS (11 Oct 2021 10:38)    Event: Notified by RN patient with fever of 38.2 F, and patient found with food on the side of her mouth with her morning pills   Patient seen at bedside, patient is lethargic moves upper extremities at times.        Vital Signs Last 24 Hrs  T(C): 38.6 (11 Oct 2021 16:50), Max: 39.3 (11 Oct 2021 13:45)  T(F): 101.4 (11 Oct 2021 16:50), Max: 102.8 (11 Oct 2021 13:45)  HR: 94 (11 Oct 2021 16:50) (58 - 100)  BP: 133/82 (11 Oct 2021 16:50) (133/82 - 152/82)  BP(mean): --  RR: 22 (11 Oct 2021 16:50) (17 - 22)  SpO2: 93% (11 Oct 2021 16:50) (92% - 94%)    Physical Exam:  General: WN/WD NAD  Neurology: lethargic   Head:  Normocephalic, atraumatic  Respiratory: CTA B/L  CV: RRR, S1S2, no murmur  Abdominal: Soft, NT, ND no palpable mass  MSK: No edema, + peripheral pulses, FROM all 4 extremity  Labs:      10-11    131<L>  |  95<L>  |  30<H>  ----------------------------<  116<H>  4.4   |  21<L>  |  1.55<H>    Ca    12.4<H>      11 Oct 2021 07:42              Radiology:    HPI:  Patient is a 80 PMHx of new onset CLL, with possible staging of ovarian cancer, HTN, HLD, CC s/p fall and fevers.  Per daughter, pt lives at home alone, fell two times while getting out of bed. States she fell onto both hands and knees while rolling out of bed. No LOC.  Denied any lightheadedness, dizziness at the time of fall.  Currently states she has some dizziness, like she would fall over if she had to walk. Was brought in today due to having a fever. PT complaining of SOB as well, and has pain in both knees. Denies cough, headache, chest pain, nausea, vomiting. Has not started any chemotherapy per daughter (04 Oct 2021 15:49)  Patient completed course of po antibiotic for UTI. Patient now with treatment for buttock wound with herpes on Valacyclovir 500 mg po BID.  Also patient with hypercalcemia, with calcium level of 12, Nephrology on board .     Today patient with change in mental status and febrile     1. Mental status changes  > MRI of head with/without contrast   > NPO for now   > Wedron-feed tube placed - Chest x-ray order  >Nutritional consult in am     2.  Elevate Temp   > complete po antibiotic for UTI  > on Valacyclovir for buttock wound with herpes   > IV Tylenol 1 gm x 1  > Blood and urine culture ordered   > check procalcitonin Level    3.  Possible aspiration (pocketing food on side of mouth)   > Chest x-ray to r/o aspiration  > NPO for now   > Speech and swallow evaluation appreciated     4. Hypercalcemia ( Ca++ level 12)  > continue gentle IV hydration   > Nephrology consult appreciated   > Endocrine consult in am     Discuss above with Dr. Smith who agreed with the plan and adjusted where  needed    Discussed plan with day Staff RN     Report given to oncoming team

## 2021-10-11 NOTE — PROGRESS NOTE ADULT - ASSESSMENT
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   She was hospitalized at [facility] a few months ago for a cardiac evaluation, which was negative, per daughter    Admitted to [facility] with fever and weakness associated with 2 falls on 10/03/21.  She developed fever on 10/03 with accelerated weakness and brought to ER for evaluation.   Fever to 101.4 in the ER prompted CTX.  Etiology of fever not clear although "B" symptoms of lymphoma possible but her decline in functional status seemed acute.   Reported abdominal pain and bloating but CT scan without acute pathology.   UA with 14 WBCs, urine cx with Klebsiella - doubt UTI   Blood cx NGTD   Started on empiric Ceftriaxone  CT chest without PNA  WNV serology neg  Quant TB test neg  Repeat RVP still neg   Repeated urine cx neg & blood cx NGTD  Last fever was on 10/05/21   Fevers resolved, but remains weak & deconditioned, interaction remains limited  Empiric Ceftriaxone was stopped on 10/08/21    She was seen by derm on 10/07 for new onset of clean based bilateral buttock & lower sacral area ulcers - started on Valtrex & ulcers were swabbed for HSV & CMV by derm  Wound cx of these ulcers recovered E faecalis - ulcers do not appear to be infected & E faecalis represents colonizing winter   Viral swab is negative for HSV & VZV    Suggest:  Follow blood culture from 10/05 until final neg  No indication for additional empiric antibiotics   Sacral & lower back ulcerations are deeper involving subcutaneous tissue & so I do not suspect a viral pathology / likely decub ulcers - they are not infected & local care alone is recommended  Valtrex for perianal ulcers as per derm  Pt will need to follow up with her primary oncologist to review further mgmt plans at [facility]  No clinical evidence of emerging infections  Disposition per primary service

## 2021-10-12 LAB
24R-OH-CALCIDIOL SERPL-MCNC: 65.1 NG/ML — SIGNIFICANT CHANGE UP (ref 30–80)
ALBUMIN SERPL ELPH-MCNC: 2.7 G/DL — LOW (ref 3.3–5)
ALP SERPL-CCNC: 148 U/L — HIGH (ref 40–120)
ALT FLD-CCNC: 26 U/L — SIGNIFICANT CHANGE UP (ref 10–45)
ANION GAP SERPL CALC-SCNC: 12 MMOL/L — SIGNIFICANT CHANGE UP (ref 5–17)
AST SERPL-CCNC: 90 U/L — HIGH (ref 10–40)
BASOPHILS # BLD AUTO: 0 K/UL — SIGNIFICANT CHANGE UP (ref 0–0.2)
BASOPHILS NFR BLD AUTO: 0 % — SIGNIFICANT CHANGE UP (ref 0–2)
BILIRUB DIRECT SERPL-MCNC: 0.2 MG/DL — SIGNIFICANT CHANGE UP (ref 0–0.2)
BILIRUB INDIRECT FLD-MCNC: 0.3 MG/DL — SIGNIFICANT CHANGE UP (ref 0.2–1)
BILIRUB SERPL-MCNC: 0.5 MG/DL — SIGNIFICANT CHANGE UP (ref 0.2–1.2)
BUN SERPL-MCNC: 41 MG/DL — HIGH (ref 7–23)
CA-I BLD-SCNC: 1.7 MMOL/L — CRITICAL HIGH (ref 1.15–1.33)
CALCIUM SERPL-MCNC: 12.3 MG/DL — HIGH (ref 8.4–10.5)
CHLORIDE SERPL-SCNC: 97 MMOL/L — SIGNIFICANT CHANGE UP (ref 96–108)
CO2 SERPL-SCNC: 24 MMOL/L — SIGNIFICANT CHANGE UP (ref 22–31)
CREAT SERPL-MCNC: 1.6 MG/DL — HIGH (ref 0.5–1.3)
CULTURE RESULTS: SIGNIFICANT CHANGE UP
EOSINOPHIL # BLD AUTO: 0 K/UL — SIGNIFICANT CHANGE UP (ref 0–0.5)
EOSINOPHIL NFR BLD AUTO: 0 % — SIGNIFICANT CHANGE UP (ref 0–6)
GLUCOSE SERPL-MCNC: 119 MG/DL — HIGH (ref 70–99)
HCT VFR BLD CALC: 31.9 % — LOW (ref 34.5–45)
HGB BLD-MCNC: 10.8 G/DL — LOW (ref 11.5–15.5)
LDH SERPL L TO P-CCNC: 514 U/L — HIGH (ref 50–242)
LYMPHOCYTES # BLD AUTO: 28.93 K/UL — HIGH (ref 1–3.3)
LYMPHOCYTES # BLD AUTO: 64.9 % — HIGH (ref 13–44)
MCHC RBC-ENTMCNC: 33.1 PG — SIGNIFICANT CHANGE UP (ref 27–34)
MCHC RBC-ENTMCNC: 33.9 GM/DL — SIGNIFICANT CHANGE UP (ref 32–36)
MCV RBC AUTO: 97.9 FL — SIGNIFICANT CHANGE UP (ref 80–100)
MONOCYTES # BLD AUTO: 2.81 K/UL — HIGH (ref 0–0.9)
MONOCYTES NFR BLD AUTO: 6.3 % — SIGNIFICANT CHANGE UP (ref 2–14)
NEUTROPHILS # BLD AUTO: 9.63 K/UL — HIGH (ref 1.8–7.4)
NEUTROPHILS NFR BLD AUTO: 21.6 % — LOW (ref 43–77)
PLATELET # BLD AUTO: 203 K/UL — SIGNIFICANT CHANGE UP (ref 150–400)
POTASSIUM SERPL-MCNC: 4.3 MMOL/L — SIGNIFICANT CHANGE UP (ref 3.5–5.3)
POTASSIUM SERPL-SCNC: 4.3 MMOL/L — SIGNIFICANT CHANGE UP (ref 3.5–5.3)
PROCALCITONIN SERPL-MCNC: 1.13 NG/ML — HIGH (ref 0.02–0.1)
PROT SERPL-MCNC: 5.4 G/DL — LOW (ref 6–8.3)
RBC # BLD: 3.26 M/UL — LOW (ref 3.8–5.2)
RBC # FLD: 19.7 % — HIGH (ref 10.3–14.5)
SODIUM SERPL-SCNC: 133 MMOL/L — LOW (ref 135–145)
SPECIMEN SOURCE: SIGNIFICANT CHANGE UP
URATE SERPL-MCNC: 5.5 MG/DL — SIGNIFICANT CHANGE UP (ref 2.5–7)
VIT D25+D1,25 OH+D1,25 PNL SERPL-MCNC: 92.7 PG/ML — HIGH (ref 19.9–79.3)
WBC # BLD: 44.57 K/UL — CRITICAL HIGH (ref 3.8–10.5)
WBC # FLD AUTO: 44.57 K/UL — CRITICAL HIGH (ref 3.8–10.5)

## 2021-10-12 PROCEDURE — 85060 BLOOD SMEAR INTERPRETATION: CPT

## 2021-10-12 PROCEDURE — 70553 MRI BRAIN STEM W/O & W/DYE: CPT | Mod: 26

## 2021-10-12 PROCEDURE — 99233 SBSQ HOSP IP/OBS HIGH 50: CPT | Mod: GC

## 2021-10-12 RX ORDER — PAMIDRONATE DISODIUM 9 MG/ML
60 INJECTION, SOLUTION INTRAVENOUS ONCE
Refills: 0 | Status: COMPLETED | OUTPATIENT
Start: 2021-10-12 | End: 2021-10-12

## 2021-10-12 RX ORDER — MEROPENEM 1 G/30ML
1000 INJECTION INTRAVENOUS EVERY 12 HOURS
Refills: 0 | Status: DISCONTINUED | OUTPATIENT
Start: 2021-10-12 | End: 2021-10-17

## 2021-10-12 RX ORDER — ACETAMINOPHEN 500 MG
1000 TABLET ORAL ONCE
Refills: 0 | Status: COMPLETED | OUTPATIENT
Start: 2021-10-12 | End: 2021-10-12

## 2021-10-12 RX ORDER — AZTREONAM 2 G
1000 VIAL (EA) INJECTION EVERY 8 HOURS
Refills: 0 | Status: DISCONTINUED | OUTPATIENT
Start: 2021-10-12 | End: 2021-10-12

## 2021-10-12 RX ORDER — VANCOMYCIN HCL 1 G
1000 VIAL (EA) INTRAVENOUS EVERY 24 HOURS
Refills: 0 | Status: DISCONTINUED | OUTPATIENT
Start: 2021-10-12 | End: 2021-10-14

## 2021-10-12 RX ADMIN — MEROPENEM 100 MILLIGRAM(S): 1 INJECTION INTRAVENOUS at 17:22

## 2021-10-12 RX ADMIN — PERPHENAZINE 2 MILLIGRAM(S): 8 TABLET, FILM COATED ORAL at 21:10

## 2021-10-12 RX ADMIN — Medication 10 MILLIGRAM(S): at 21:11

## 2021-10-12 RX ADMIN — LIDOCAINE 1 PATCH: 4 CREAM TOPICAL at 15:08

## 2021-10-12 RX ADMIN — Medication 400 MILLIGRAM(S): at 08:54

## 2021-10-12 RX ADMIN — SENNA PLUS 2 TABLET(S): 8.6 TABLET ORAL at 21:26

## 2021-10-12 RX ADMIN — LIDOCAINE 1 PATCH: 4 CREAM TOPICAL at 21:03

## 2021-10-12 RX ADMIN — Medication 250 MILLIGRAM(S): at 11:05

## 2021-10-12 RX ADMIN — PAMIDRONATE DISODIUM 65 MILLIGRAM(S): 9 INJECTION, SOLUTION INTRAVENOUS at 17:23

## 2021-10-12 RX ADMIN — HEPARIN SODIUM 5000 UNIT(S): 5000 INJECTION INTRAVENOUS; SUBCUTANEOUS at 21:10

## 2021-10-12 RX ADMIN — HEPARIN SODIUM 5000 UNIT(S): 5000 INJECTION INTRAVENOUS; SUBCUTANEOUS at 05:07

## 2021-10-12 RX ADMIN — HEPARIN SODIUM 5000 UNIT(S): 5000 INJECTION INTRAVENOUS; SUBCUTANEOUS at 14:46

## 2021-10-12 NOTE — PROGRESS NOTE ADULT - ASSESSMENT
Patient is a 80 PMHx of new onset CLL, with possible staging of ovarian cancer, HTN, HLD, CC s/p fall and fevers.  Per daughter, pt lives at home alone, fell two times while getting out of bed. States she fell onto both hands and knees while rolling out of bed. No LOC.  Denied any lightheadedness, dizziness at the time of fall.  Currently states she has some dizziness, like she would fall over if she had to walk. Was brought in today due to having a fever. PT complaining of SOB as well, and has pain in both knees. Denies cough, headache, chest pain, nausea, vomiting. Has not started any chemotherapy per daughter    #Leukocytosis with fever  -Overnight patient was found to be febrile  -NPO, NGT put in place, CXR results noted  -As per ID patient started on Vancomycin and Meropenam for elevated WBC count, will monitor   -Awaiting culture results  -Procalcitonin level noted     # AMS and dizziness   CT head noted  fall precautions  orthostatics  Neurology eval appreciated follow up recs  -As per neuro brain MRI w/ and w/o IV contrast was ordered on 10/11    # Sepsis  R/O sepsis   UTI vs tumor fever vs other etiologies   Pan cx   Completed course of Rocephin, stopped on 10/08 as per ID  monitor clinically   ID eval appreciated   Hydration as tolerated  Pan CT noted      # HARJIT   HARJIT will monitor renal function  -Elevated calcium, as per renal Lasix is on hold due to likely dehydration, repeat BMP to monitor, and limit fluids to 50cc/hr for 1L  Monitor renal function  I and Os   As per nephrology, monitor renal function and avoid nephrotoxic   -As per renal, monitor Na levels, free water restriction to 1L/day  - Endo eval       PTH level and Calcium levels noted. Will get endo evaluation     # Ovarian Ca, as per GYN/Onc there is no ovarian CA   likely due to her CLL   seen on CT   onc follow up   Patient follows with [facility], outpatient follow up     # HTN   No on an antihypertensive medication   Monitor BP      # HLD   lipid panel     #Wheezing, fluid overload likely   Cont Duonebs  D/C IVF  will adjust diuresis, on hold for now   BNP results noted. Will follow  Echo ordered, awaiting the results  Card eval appreciated     DVT and GI PPX        Patient is a 80 PMHx of new onset CLL, with possible staging of ovarian cancer, HTN, HLD, CC s/p fall and fevers.  Per daughter, pt lives at home alone, fell two times while getting out of bed. States she fell onto both hands and knees while rolling out of bed. No LOC.  Denied any lightheadedness, dizziness at the time of fall.  Currently states she has some dizziness, like she would fall over if she had to walk. Was brought in today due to having a fever. PT complaining of SOB as well, and has pain in both knees. Denies cough, headache, chest pain, nausea, vomiting. Has not started any chemotherapy per daughter    #Leukocytosis with fever  -Overnight patient was found to be febrile  -NPO, NGT put in place, CXR results noted  -As per ID patient started on Vancomycin and Meropenam for elevated WBC count, will monitor   -Awaiting culture results  -Procalcitonin level noted   - plan for MR brain     # AMS and dizziness   CT head noted  fall precautions  orthostatics  Neurology eval appreciated follow up recs  -As per neuro brain MRI w/ and w/o IV contrast was ordered on 10/11    # Sepsis  R/O sepsis   UTI vs tumor fever vs other etiologies   Pan cx   Completed course of Rocephin, stopped on 10/08 as per ID  monitor clinically   ID eval appreciated   Hydration as tolerated  Pan CT noted      # HARJIT   HARJIT will monitor renal function  -Elevated calcium, as per renal Lasix is on hold due to likely dehydration, repeat BMP to monitor, and limit fluids to 50cc/hr for 1L  Monitor renal function  I and Os   As per nephrology, monitor renal function and avoid nephrotoxic   -As per renal, monitor Na levels, free water restriction to 1L/day  - Endo eval       PTH level and Calcium levels noted. Will get endo evaluation     # Ovarian Ca, as per GYN/Onc there is no ovarian CA   likely due to her CLL   seen on CT   onc follow up   Patient follows with [facility], outpatient follow up     # HTN   No on an antihypertensive medication   Monitor BP      # HLD   lipid panel     #Wheezing, fluid overload likely   Cont Duonebs  D/C IVF  will adjust diuresis, on hold for now   BNP results noted. Will follow  Echo ordered, awaiting the results  Card eval appreciated     DVT and GI PPX

## 2021-10-12 NOTE — PROGRESS NOTE ADULT - SUBJECTIVE AND OBJECTIVE BOX
CC: f/u for fever  Patient reports i am ok but very lethargic    REVIEW OF SYSTEMS:  All other review of systems negative (Comprehensive ROS) cannot get    Antimicrobials Day #  :1  meropenem  IVPB 1000 milliGRAM(s) IV Intermittent every 12 hours  valACYclovir 500 milliGRAM(s) Oral two times a day  vancomycin  IVPB 1000 milliGRAM(s) IV Intermittent every 24 hours    Other Medications Reviewed    T(F): 99.2 (10-12-21 @ 08:26), Max: 102.8 (10-11-21 @ 13:45)  HR: 89 (10-12-21 @ 08:26)  BP: 149/80 (10-12-21 @ 08:26)  RR: 22 (10-12-21 @ 08:26)  SpO2: 92% (10-12-21 @ 08:26)  Wt(kg): --    PHYSICAL EXAM:  General: very lethargic, , no acute distress  Eyes:  anicteric, no conjunctival injection, no discharge  Oropharynx: no lesions or injection 	  Neck: supple, without adenopathy  Lungs: poor effort to auscultation  Heart: regular rate and rhythm; no murmur, rubs or gallops  Abdomen: soft, nondistended, nontender, without mass or organomegaly  Skin: no lesions  Extremities: no clubbing, cyanosis, or edema  Neurologic: very lethargic moves all extremities    LAB RESULTS:                        10.8   44.57 )-----------( 203      ( 12 Oct 2021 06:47 )             31.9     10-12    133<L>  |  97  |  41<H>  ----------------------------<  119<H>  4.3   |  24  |  1.60<H>    Ca    12.3<H>      12 Oct 2021 06:46    TPro  5.4<L>  /  Alb  2.7<L>  /  TBili  0.5  /  DBili  0.2  /  AST  90<H>  /  ALT  26  /  AlkPhos  148<H>  10-12    LIVER FUNCTIONS - ( 12 Oct 2021 06:46 )  Alb: 2.7 g/dL / Pro: 5.4 g/dL / ALK PHOS: 148 U/L / ALT: 26 U/L / AST: 90 U/L / GGT: x             MICROBIOLOGY:  RECENT CULTURES:  10-07 @ 16:57 Ear R buttock woudn culture Enterococcus faecalis    Moderate Enterococcus faecalis          RADIOLOGY REVIEWED:  < from: Xray Chest 1 View- PORTABLE-Urgent (Xray Chest 1 View- PORTABLE-Urgent .) (10.11.21 @ 20:51) >    EXAM:  XR CHEST PORTABLE URGENT 1V                            PROCEDURE DATE:  10/11/2021            INTERPRETATION:  EXAMINATION: XR CHEST URGENT    CLINICAL INDICATION: NGT placement. Evaluate for aspiration.    TECHNIQUE: Single portable view ofthe chest was obtained.    COMPARISON: X-ray chest from 10/6/2021.    FINDINGS:    Heart size is not well assessed on an AP film. There is patchy atelectasis at the left lung base.  Enteric tube tip is coiled inside the stomach.    IMPRESSION:  Patchy atelectasis at the left lung base.  Enteric tube tip is coiled inside the stomach.    --- End of Report ---        < end of copied text >    < from: CT Chest No Cont (10.04.21 @ 20:17) >    EXAM:  CT CHEST                            PROCEDURE DATE:  10/04/2021            INTERPRETATION:  CLINICAL INFORMATION: Shortness of breath, evaluate for pneumonia    COMPARISON: CT abdomen and pelvis 10/4/2021    CONTRAST/COMPLICATIONS:  IV Contrast: None  Oral Contrast: None  Complications: No immediate complication    PROCEDURE:  CT of the Chest was performed.  Sagittal and coronal reformats were performed.    FINDINGS:    LUNGS AND AIRWAYS: Patent central airways.  Calcified nodule abutting the left anterior pleura (series 4, image 57). 3 mm nodule in the left upper lobe (series 4, image 39). Additional 2 mm nodule in the left upper lobe (series 4, image 58).  PLEURA: No pleural effusion.  MEDIASTINUM AND ERICA: Multiple enlarged supraclavicular lymph nodes. Additional axillary and mediastinal lymphadenopathy is seen.  VESSELS: Within normal limits.  HEART: Heart size is normal. No pericardial effusion.  CHEST WALL AND LOWER NECK: Calcifications within the bilateral thyroid lobes.  VISUALIZED UPPER ABDOMEN: Please refer to prior CT abdomen/pelvis.  BONES: Degenerative changes of the spine.    IMPRESSION:  Lymphadenoapthy described above is consistent with CLL as per patient history.    No CT evidence for pneumonia.    < from: CT Abdomen and Pelvis w/ IV Cont (10.04.21 @ 09:30) >    EXAM:  CT ABDOMEN AND PELVIS IC                            PROCEDURE DATE:  10/04/2021            INTERPRETATION:  CLINICAL INFORMATION: Left lower quadrant pain    COMPARISON: None.    CONTRAST/COMPLICATIONS:  IV Contrast: Omnipaque 350  90 cc administered   10 cc discarded  Oral Contrast: None  Complications: None    PROCEDURE:  CT of the Abdomen and Pelvis was performed.  Sagittal and coronal reformats were performed.    FINDINGS:  LOWER CHEST: Bibasilar atelectasis. Partially imaged enlarged right axillary lymph nodes.    LIVER: Within normal limits.  BILE DUCTS: Normal caliber.  GALLBLADDER: Within normal limits.  SPLEEN: Within normal limits.  PANCREAS: Fatty atrophy of the pancreas without cutoff. No ductal dilatation or cutoff.  ADRENALS: Within normal limits.  KIDNEYS/URETERS: Within normal limits.    BLADDER/REPRODUCTIVE ORGANS: Multiloculated right adnexal cystic mass measuring 14.6 x 9.0 cm, which abuts the appendix. Left adnexal cyst measures 2.1 x 1.4 cm. Uterus is unremarkable.    BOWEL: No bowel obstruction. Appendix is normal. Colonic diverticulosis.  PERITONEUM: 2 small nodules are present adjacent to the cystic lesion measuring 4 and 6 mm in diameter, respectively (2:75 and 79), possibly peritoneal nodules or small pericolonic lymph nodes.  VESSELS: Within normal limits.  RETROPERITONEUM/LYMPH NODES: Enlarged bilateral inguinal, iliac, mesenteric, and retroperitoneal lymph nodes.  *  Reference left inguinal lymph node measures up to 4.0 x 2.5 cm (2:93).  *  Reference left periaortic lymph node measures up to 2.5 x 2.3 cm (2:45).  ABDOMINAL WALL: Within normal limits.  BONES: Degenerative changes. Lumbar dextroscoliosis.    IMPRESSION:  Enlarged bilateral inguinal, iliac, mesenteric, and retroperitoneal lymph nodes. Partially imaged enlarged right axillary lymph nodes. Primary consideration is lymphoma. Metastatic disease is not excluded. Dedicated chest imaging is recommended for full evaluation.    Cystic right adnexal mass measuring 14.6 x 9.0 cm, concerning for epithelial neoplasm.              --- End of Report ---      < end of copied text >      < end of copied text >    Assessment:  Patient had recent stay at [facility] for cardiac w/u , recent dx of CLL/small cell lymphoma, no tx. large pelvic adnexal mass concerning for ovarian ca but not biopsied, admitted about a week ago with fevers, treated for concern of a uti with ctx for 5 doses. Fever had resolved but over the past 24 hours came back, very lethargic, found to be pocketing foot in the mouth raising concern for possible aspiration pneumonia. I am concerned about b symptoms and even cns involvement with the CLL too. wnv, quant gold, bc have been negative  Plan:  start vanco and meropenem  repeat bc pend  await planned mri of the brain. May need to consider an LP  Further w/u to be determined

## 2021-10-12 NOTE — PROGRESS NOTE ADULT - ASSESSMENT
Patient is a 80 PMHx of new onset CLL, with possible staging of ovarian cancer, HTN, HLD, CC s/p fall and fevers.  Per daughter, pt lives at home alone, fell two times while getting out of bed. States she fell onto both hands and knees while rolling out of bed. No LOC.  Denied any lightheadedness, dizziness at the time of fall.  Was brought in today due to having a fever. PT complaining of SOB as well, and has pain in both knees. Denies cough, headache, chest pain, nausea, vomiting. Has not started any chemotherapy per daughter. Has renal failure, denied h/o renal failure      A/P:  HARJIT  Baseline Scr unclear  HARJIT possibly sec to contrast s/p 10/4   Renal function was improving but worsened and now stable today  Has high ca as well, possibly dehydrated  lasix on hold-yesterday was last dose  FEUrea 30-suggested pre-renal state s/p 2L NS  Now has fever, increased WBC-possible sepsis  getting vanco-monitor vanco level  Monitor renal function at present  Monitor I/O  Avoid nephrotoxics, NSAIds RCA    Hypercalcemia:  Etiology?  dehydration Vs sec to malignancy  s/p IVF    Oncology follow up    Hyponatremia  Initial Urine lytes suggestive of dehydration   Repeat Urine lytes suggestive of SIADH  Sodium better today  Monitor Na level daily   Free water restriction 1l/day                    Continue  antibiotics in  NS instead of D5     HTN:  not on antihypertensive meds  monitor at present     Proteinuria/Hematuria  in setting of UTI  Repeat UA post tx   Ct A/P with no renal mass

## 2021-10-12 NOTE — SWALLOW BEDSIDE ASSESSMENT ADULT - SLP GENERAL OBSERVATIONS
Pt received in bed. +#L NC. Grossly A&Ox2 (to first name after maximal cueing and field of two choices, to "hospital"). Occitan speaking.  phone offered. Granddaughter reports pt speaks dialect of Occitan and requested pt's daughter provide translation via Face Time. Pt alert but with inconsistent responsiveness throughout assessment. Poor command following. Vocal quality hypophonic. Limited verbalizations. Poor positioning, favoring tilt to left requiring continual positioning throughout assessment. Head in flexion position throughout assessment. Overall weak, deconditioned appearance.

## 2021-10-12 NOTE — PROGRESS NOTE ADULT - ASSESSMENT
81yo F with  new onset CLL, with possible staging of ovarian cancer, HTN, HLD, RA, anxiety, s/p fall and fevers.   AMS likely 2/2 infection.  dizziness better + UTI  CTH and CT c spien unremarkable   CT C A P  LDL 44, A1c 5.2  b12 WNL  10/12 tmax 102.8 started vanco/toby, more lethargic   - infectious workup.   - ceftriaxone for infection /UTI now off. rising WBC. started on vanco/toby   - heme/onc for CLL. recs appreciated   - HARJIT, IVF  - MRI brain w/ and w/o if in agreement with GOC  - check orthostatics   - PT/OT/SS/SLP, OOBC  - check FS, glucose control <180  - GI/DVT ppx  - Counseling on diet, exercise, and medication adherence was done  - Counseling on smoking cessation and alcohol consumption offered when appropriate.  - Pain assessed and judicious use of narcotics when appropriate was discussed.    - Stroke education given when appropriate.  - Importance of fall prevention discussed.   - Differential diagnosis and plan of care discussed with patient and/or family and primary team  - Thank you for allowing me to participate in the care of this patient. Call with questions.   - d/c planning after infectious workup   Sherman Thompson MD  Vascular Neurology  Office: [phone]

## 2021-10-12 NOTE — PROVIDER CONTACT NOTE (CRITICAL VALUE NOTIFICATION) - RECOMMENDATIONS
Give IV tylenol for PO temp 99.2 and mild abd discomfort - pt hot to touch, expedite speech and swallow to evaluate for how to get pt nutrition. unknown

## 2021-10-12 NOTE — PROGRESS NOTE ADULT - SUBJECTIVE AND OBJECTIVE BOX
Date of Service   10-12-21 @ 13:10    Patient is a 80y old  Female who presents with a chief complaint of AMS (12 Oct 2021 09:23)      INTERVAL HISTORY:     TELEMETRY Personally reviewed:    REVIEW OF SYSTEMS:   CONSTITUTIONAL: No weakness  EYES/ENT: No visual changes; No throat pain  Neck: No pain or stiffness  Respiratory: No cough, wheezing, No shortness of breath  CARDIOVASCULAR: no chest pain or palpitations  GASTROINTESTINAL: No abdominal pain, no nausea, vomiting or hematemesis  GENITOURINARY: No dysuria, frequency or hematuria  NEUROLOGICAL: No stroke like symptoms  SKIN: No rashes    	  MEDICATIONS:        PHYSICAL EXAM:  T(C): 37.3 (10-12-21 @ 08:26), Max: 39.3 (10-11-21 @ 13:45)  HR: 89 (10-12-21 @ 08:26) (87 - 94)  BP: 149/80 (10-12-21 @ 08:26) (133/82 - 156/75)  RR: 22 (10-12-21 @ 08:26) (20 - 22)  SpO2: 92% (10-12-21 @ 08:26) (92% - 94%)  Wt(kg): --  I&O's Summary    11 Oct 2021 07:01  -  12 Oct 2021 07:00  --------------------------------------------------------  IN: 600 mL / OUT: 950 mL / NET: -350 mL    12 Oct 2021 07:01  -  12 Oct 2021 13:10  --------------------------------------------------------  IN: 0 mL / OUT: 175 mL / NET: -175 mL          Appearance: In no distress	  HEENT:    PERRL, EOMI	  Cardiovascular:  S1 S2, No JVD  Respiratory: Lungs clear to auscultation	  Gastrointestinal:  Soft, Non-tender, + BS	  Vascularature:  No edema of LE  Psychiatric: Appropriate affect   Neuro: no acute focal deficits                               10.8   44.57 )-----------( 203      ( 12 Oct 2021 06:47 )             31.9     10-12    133<L>  |  97  |  41<H>  ----------------------------<  119<H>  4.3   |  24  |  1.60<H>    Ca    12.3<H>      12 Oct 2021 06:46    TPro  5.4<L>  /  Alb  2.7<L>  /  TBili  0.5  /  DBili  0.2  /  AST  90<H>  /  ALT  26  /  AlkPhos  148<H>  10-12        Labs personally reviewed      ASSESSMENT/PLAN: 	  Patient is a 80 PMHx of new onset CLL, with possible staging of ovarian cancer, HTN, HLD, CC s/p fall and fevers.  Per daughter, pt lives at home alone, fell two times while getting out of bed. States she fell onto both hands and knees while rolling out of bed. No LOC.  Denied any lightheadedness, dizziness at the time of fall.  Currently states she has some dizziness, like she would fall over if she had to walk. Was brought in today due to having a fever. PT complaining of SOB as well, and has pain in both knees. Denies cough, headache, chest pain, nausea, vomiting. Has not started any chemotherapy per daughter    # AMS and dizziness   check orthostatics  Neurology eval recommends MRI of brain- pending [facility] discussion  Unlikely cardiac in nature    # SOB  d/pablito IVF as CXR with mild pulm vasc congestion  - Recurrent SOB again this am  - Lasix 40mg IV daily, close monitor Cr given recent HARJIT    # HARJIT   per nephro:  Baseline Scr unclear  HARJIT possibly sec to contrast s/p 10/4   Renal function was improving but worsened today  Has high ca as well, possibly dehydrated  Hold lasix for a day-received today's dose   Monitor renal function at present  Avoid nephrotoxics, NSAID's RCA    # HTN   continue BP meds  adjust as tolerated     # HLD   lipid panel normal except HDL 15        [facility]   Dallas Monaco DO [facility]  Cardiovascular Medicine  [address], [address]  Office: [phone]  Cell: [phone]

## 2021-10-12 NOTE — SWALLOW BEDSIDE ASSESSMENT ADULT - ADDITIONAL RECOMMENDATIONS
Maintain good oral hygiene.   This service will continue to follow to assess for improved swallow function and candidacy for PO diet.

## 2021-10-12 NOTE — SWALLOW BEDSIDE ASSESSMENT ADULT - SLP PERTINENT HISTORY OF CURRENT PROBLEM
Patient is a 80 PMHx of new onset CLL, with possible staging of ovarian cancer, HTN, HLD, CC s/p fall and fevers.  Per daughter, pt lives at home alone, fell two times while getting out of bed. States she fell onto both hands and knees while rolling out of bed. No LOC.  Denied any lightheadedness, dizziness at the time of fall.  Currently states she has some dizziness, like she would fall over if she had to walk. Was brought in today due to having a fever. PT complaining of SOB as well, and has pain in both knees. Denies cough, headache, chest pain, nausea, vomiting. Has not started any chemotherapy per daughter. R/o sepsis. + UTI, + HARJIT. ID: Etiology of fever not clear. She has abdominal pain and bloating although CT scan is without acute pathology. She does not look toxic or septic.

## 2021-10-12 NOTE — SWALLOW BEDSIDE ASSESSMENT ADULT - NS ASR SWALLOW FINDINGS DISCUS
LATRELL Amos; JUAN Moore; pt's daughter Janie nathanael Hardwick; pt's granddaughter at the bedside/Patient/Family

## 2021-10-12 NOTE — SWALLOW BEDSIDE ASSESSMENT ADULT - SWALLOW EVAL: PATIENT/FAMILY GOALS STATEMENT
Per daughter (via FaceTime) and granddaughter, prior to admittance pt received a soft texture diet with thin liquid. Tolerated without concern for dysphagia/aspiration. Pt received soft food due to dentition. During this admission pt began pocketing food.

## 2021-10-12 NOTE — PROGRESS NOTE ADULT - SUBJECTIVE AND OBJECTIVE BOX
Dr. Lowe  Office [phone]  Cell [phone]  Tanika SAMS  Cell [phone]    RENAL PROGRESS NOTE: DATE OF SERVICE 10-12-21 @ 13:41    Patient is a 80y old  Female who presents with a chief complaint of AMS (12 Oct 2021 09:23)      Patient seen and examined at bedside. No chest pain/sob, was febrile overnight    VITALS:  T(F): 99.2 (10-12-21 @ 08:26), Max: 102.8 (10-11-21 @ 13:45)  HR: 89 (10-12-21 @ 08:26)  BP: 149/80 (10-12-21 @ 08:26)  RR: 22 (10-12-21 @ 08:26)  SpO2: 92% (10-12-21 @ 08:26)  Wt(kg): --    10-11 @ 07:01  -  10-12 @ 07:00  --------------------------------------------------------  IN: 600 mL / OUT: 950 mL / NET: -350 mL    10-12 @ 07:01  -  10-12 @ 13:41  --------------------------------------------------------  IN: 0 mL / OUT: 225 mL / NET: -225 mL          PHYSICAL EXAM:  Constitutional: NAD  Neck: No JVD  Respiratory: CTAB, no wheezes, rales or rhonchi  Cardiovascular: S1, S2, RRR  Gastrointestinal: BS+, soft, NT/ND  Extremities: No peripheral edema    Hospital Medications:   MEDICATIONS  (STANDING):  amitriptyline 10 milliGRAM(s) Oral at bedtime  ascorbic acid 500 milliGRAM(s) Oral daily  heparin   Injectable 5000 Unit(s) SubCutaneous every 8 hours  influenza   Vaccine 0.5 milliLiter(s) IntraMuscular once  lidocaine   4% Patch 1 Patch Transdermal every 24 hours  LORazepam     Tablet 2 milliGRAM(s) Oral two times a day  meropenem  IVPB 1000 milliGRAM(s) IV Intermittent every 12 hours  multivitamin 1 Tablet(s) Oral daily  perphenazine 2 milliGRAM(s) Oral at bedtime  polyethylene glycol 3350 17 Gram(s) Oral daily  senna 2 Tablet(s) Oral at bedtime  sodium chloride 0.9%. 1000 milliLiter(s) (50 mL/Hr) IV Continuous <Continuous>  sodium chloride 0.9%. 1000 milliLiter(s) (50 mL/Hr) IV Continuous <Continuous>  valACYclovir 500 milliGRAM(s) Oral two times a day  vancomycin  IVPB 1000 milliGRAM(s) IV Intermittent every 24 hours      LABS:  10-12    133<L>  |  97  |  41<H>  ----------------------------<  119<H>  4.3   |  24  |  1.60<H>    Ca    12.3<H>      12 Oct 2021 06:46    TPro  5.4<L>  /  Alb  2.7<L>  /  TBili  0.5  /  DBili  0.2  /  AST  90<H>  /  ALT  26  /  AlkPhos  148<H>  10-12    Creatinine Trend: 1.60 <--, 1.55 <--, 1.55 <--, 1.55 <--, 1.31 <--, 1.20 <--, 1.28 <--    Vitamin D, 25-Hydroxy: 65.1 ng/mL (10-12 @ 08:57)  Albumin, Serum: 2.7 g/dL (10-12 @ 06:46)                              10.8   44.57 )-----------( 203      ( 12 Oct 2021 06:47 )             31.9     Urine Studies:  Urinalysis - [10-05-21 @ 17:05]      Color Yellow / Appearance Clear / SG 1.033 / pH 6.0      Gluc Negative / Ketone Negative  / Bili Negative / Urobili Negative       Blood Moderate / Protein 100 / Leuk Est Negative / Nitrite Negative      RBC 2 / WBC 3 / Hyaline 1 / Gran  / Sq Epi  / Non Sq Epi 2 / Bacteria Negative    Urine Creatinine 79      [10-10-21 @ 17:00]  Urine Sodium 60      [10-07-21 @ 12:53]  Urine Urea Nitrogen 357      [10-10-21 @ 21:55]  Urine Osmolality 474      [10-07-21 @ 12:53]    PTH -- (Ca 12.2)      [10-11-21 @ 08:51]   8  Vitamin D (25OH) 65.1      [10-12-21 @ 08:57]  TSH 1.15      [10-05-21 @ 16:30]  Lipid: chol 87, , HDL 15, LDL --      [10-05-21 @ 16:30]      Free Light Chains: kappa 22.16, lambda 2.82, ratio = 7.86      [10-07 @ 17:52]    RADIOLOGY & ADDITIONAL STUDIES:

## 2021-10-12 NOTE — SWALLOW BEDSIDE ASSESSMENT ADULT - SWALLOW EVAL: DIAGNOSIS
Patient is a 80 PMHx of new onset CLL, with possible staging of ovarian cancer, HTN, HLD, CC s/p fall and fevers. Pt presents with an oral and suspected pharyngeal dysphagia superimposed upon a reduced mental status and characterized by reduced oral aperture, reduced grading to utensil, delayed initiation of oral action, delayed oral transit time, delay in trigger of the pharyngeal swallow (at times requiring verbal cueing). Pt with wet, cough post intake of crushed ice chip suggestive of laryngeal penetration/aspiration.

## 2021-10-12 NOTE — PROGRESS NOTE ADULT - SUBJECTIVE AND OBJECTIVE BOX
Name of Patient : PREM KAY  MRN: [id number]  Date of visit: 10-12-21 @ 10:05      Subjective: Patient seen and examined. No new events except as noted. Overnight patient was febrile.       MEDICATIONS:  MEDICATIONS  (STANDING):  amitriptyline 10 milliGRAM(s) Oral at bedtime  ascorbic acid 500 milliGRAM(s) Oral daily  aztreonam  IVPB 1000 milliGRAM(s) IV Intermittent every 8 hours  heparin   Injectable 5000 Unit(s) SubCutaneous every 8 hours  influenza   Vaccine 0.5 milliLiter(s) IntraMuscular once  lidocaine   4% Patch 1 Patch Transdermal every 24 hours  LORazepam     Tablet 2 milliGRAM(s) Oral two times a day  multivitamin 1 Tablet(s) Oral daily  perphenazine 2 milliGRAM(s) Oral at bedtime  polyethylene glycol 3350 17 Gram(s) Oral daily  senna 2 Tablet(s) Oral at bedtime  sodium chloride 0.9%. 1000 milliLiter(s) (50 mL/Hr) IV Continuous <Continuous>  sodium chloride 0.9%. 1000 milliLiter(s) (50 mL/Hr) IV Continuous <Continuous>  valACYclovir 500 milliGRAM(s) Oral two times a day      PHYSICAL EXAM:  T(C): 37.3 (10-12-21 @ 08:26), Max: 39.3 (10-11-21 @ 13:45)  HR: 89 (10-12-21 @ 08:26) (87 - 94)  BP: 149/80 (10-12-21 @ 08:26) (133/82 - 156/75)  RR: 22 (10-12-21 @ 08:26) (20 - 22)  SpO2: 92% (10-12-21 @ 08:26) (92% - 94%)  Wt(kg): --  I&O's Summary    11 Oct 2021 07:01  -  12 Oct 2021 07:00  --------------------------------------------------------  IN: 600 mL / OUT: 950 mL / NET: -350 mL    12 Oct 2021 07:01  -  12 Oct 2021 10:05  --------------------------------------------------------  IN: 0 mL / OUT: 175 mL / NET: -175 mL          Appearance: awake 	  HEENT:  PERRLA   Lymphatic: No lymphadenopathy   Cardiovascular: Normal S1 S2, no JVD  Respiratory: normal effort , clear  Gastrointestinal:  Soft, Non-tender  Skin: No rashes,  warm to touch  Psychiatry:  Mood & affect appropriate  Musculuskeletal: No edema      All labs, Imaging and EKGs personally reviewed       10-11-21 @ 07:01  -  10-12-21 @ 07:00  --------------------------------------------------------  IN: 600 mL / OUT: 950 mL / NET: -350 mL    10-12-21 @ 07:01  -  10-12-21 @ 10:05  --------------------------------------------------------  IN: 0 mL / OUT: 175 mL / NET: -175 mL                            10.8   44.57 )-----------( 203      ( 12 Oct 2021 06:47 )             31.9               10-12    133<L>  |  97  |  41<H>  ----------------------------<  119<H>  4.3   |  24  |  1.60<H>    Ca    12.3<H>      12 Oct 2021 06:46    TPro  5.4<L>  /  Alb  2.7<L>  /  TBili  0.5  /  DBili  0.2  /  AST  90<H>  /  ALT  26  /  AlkPhos  148<H>  10-12      < from: Xray Chest 1 View- PORTABLE-Urgent (Xray Chest 1 View- PORTABLE-Urgent .) (10.11.21 @ 20:51) >    IMPRESSION:  Patchy atelectasis at the left lung base.  Enteric tube tip is coiled inside the stomach.    < end of copied text >

## 2021-10-12 NOTE — SWALLOW BEDSIDE ASSESSMENT ADULT - COMMENTS
Hx cont: CXR negative, CT of A/P negative for source of infection. Heme Onc evaluation- possible lymphoma fever. Neurology: AMS likely 2/2 infection. CTH and CT C spine unremarkable. Rx MRI brain. RD: Granddaughter reports pt has dentures but doesn't wear them all the time, believes pt consumes softer foods. Denies pt with issues swallowing. Amendable to trying soft diet. 10/6 and 10/7: ID: Pt wheezing and SOB. CXR with mild pulm vasc congestion. Medicine: Likely Overload  2/2 IVF. RN note: sores on b/l buttock assessed by wound, as per family at bedside shave obtained and dx w herpes. Derm: Ulcers of the genital region- High suspicion for viral etiology given patient's history, immunosuppression (on MTX for RA), and clinical appearance of lesions. Swabbed lesions for HSV and CMV pcr. Viral swab is negative for HSV & VZV. Per ID, likely decub ulcers. Heme/onc: Family reported that she has had a wt loss of 40lbs in the last year. During the last two months, they noticed a rapid decline in her function. She was complaining about feeling more tired, sleeps more and gets up more often during her sleep. Gyn/Onc: Pt's family requests that CLL and "cancer" not be mentioned to patient. Defer any surgical intervention unless patient begins experiencing severe pain from the mass. Per GYN/Onc there is no ovarian CA, likely due to her CLL. 10/9 ID: CT chest without PNA, WNV serology neg, Quant TB test neg, Repeat RVP still neg. Fevers have resolved, but weak & deconditioned, interaction limited 10/10: RN note: Increased confusion. 10/11: RN note: pt refusing to eat dinner, any attempts made, pt would pocket food and not swallow, coughs when thin liquids given. Medicine:  + fever, patient found with food on the side of her mouth with her morning pills. Patient seen at bedside, patient is lethargic moves upper extremities at times. NPO, NGT. Possible aspiration (pocketing food on side of mouth). Chest x-ray to r/o aspiration. + leukocytosis. Abx.

## 2021-10-12 NOTE — SWALLOW BEDSIDE ASSESSMENT ADULT - CONSISTENCIES ADMINISTERED
crushed ice chips x2 Further PO trials deferred given reduced mental status and aspiration risk at this time

## 2021-10-12 NOTE — SWALLOW BEDSIDE ASSESSMENT ADULT - MUCOSAL QUALITY
White patchy coating noted on lingual surface and hard palate/velum. Attempted oral care but pt resistant to oral swab presented to oral cavity despite maximal verbal and tactile cueing. MD please assess for oral thrush vs other. JUAN Moore and LATRELL Amos made aware.

## 2021-10-12 NOTE — PROGRESS NOTE ADULT - SUBJECTIVE AND OBJECTIVE BOX
Neurology Progress Note    S: Patient seen and examined. resting in bed. doing okay.  started on Abx rising wbc    Medication:  MEDICATIONS  (STANDING):  amitriptyline 10 milliGRAM(s) Oral at bedtime  ascorbic acid 500 milliGRAM(s) Oral daily  heparin   Injectable 5000 Unit(s) SubCutaneous every 8 hours  influenza   Vaccine 0.5 milliLiter(s) IntraMuscular once  lidocaine   4% Patch 1 Patch Transdermal every 24 hours  LORazepam     Tablet 2 milliGRAM(s) Oral two times a day  meropenem  IVPB 1000 milliGRAM(s) IV Intermittent every 12 hours  multivitamin 1 Tablet(s) Oral daily  perphenazine 2 milliGRAM(s) Oral at bedtime  polyethylene glycol 3350 17 Gram(s) Oral daily  senna 2 Tablet(s) Oral at bedtime  sodium chloride 0.9%. 1000 milliLiter(s) (50 mL/Hr) IV Continuous <Continuous>  sodium chloride 0.9%. 1000 milliLiter(s) (50 mL/Hr) IV Continuous <Continuous>  valACYclovir 500 milliGRAM(s) Oral two times a day  vancomycin  IVPB 1000 milliGRAM(s) IV Intermittent every 24 hours    MEDICATIONS  (PRN):  acetaminophen   Tablet .. 650 milliGRAM(s) Oral every 6 hours PRN Mild Pain (1 - 3), Moderate Pain (4 - 6)  acetaminophen   Tablet .. 650 milliGRAM(s) Oral every 6 hours PRN Temp greater or equal to 38C (100.4F)  albuterol/ipratropium for Nebulization 3 milliLiter(s) Nebulizer every 6 hours PRN Shortness of Breath and/or Wheezing      Vitals:  Vital Signs Last 24 Hrs  T(C): 37.3 (10-12-21 @ 08:26), Max: 39.3 (10-11-21 @ 13:45)  T(F): 99.2 (10-12-21 @ 08:26), Max: 102.8 (10-11-21 @ 13:45)  HR: 89 (10-12-21 @ 08:26) (87 - 94)  BP: 149/80 (10-12-21 @ 08:26) (133/82 - 156/75)  BP(mean): --  RR: 22 (10-12-21 @ 08:26) (20 - 22)  SpO2: 92% (10-12-21 @ 08:26) (92% - 94%)            General Exam:   General Appearance: Appropriately dressed and in no acute distress       Head: Normocephalic, atraumatic and no dysmorphic features  Ear, Nose, and Throat: Moist mucous membranes  CVS: S1S2+  Resp: No SOB, no wheeze or rhonchi  GI: soft NT/ND  Extremities: No edema or cyanosis  Skin: No bruises or rashes     Neurological Exam:  Mental Status: Awake, alert and oriented x 1.  Able to follow simple and complex verbal commands. Able to name and repeat. fluent speech. No obvious aphasia or dysarthria noted. masked facies. + frontal release sigbns  more leathargif   Cranial Nerves: PERRL, EOMI, VFFC, sensation V1-V3 intact,  L facial asymmetry, equal elevation of palate, scm/trap 5/5, tongue is midline on protrusion. no obvious papilledema on fundoscopic exam. hearing is grossly intact.   Motor:HASTINGS but + tremor L>R moves Uppers>lowers   Sensation: Intact to light touch and pinprick throughout. no right/left confusion. no extinction to tactile on DSS.   Reflexes: 1+ throughout at biceps, brachioradialis, triceps, patellars and ankles bilaterally and equal. No clonus. R toe and L toe were both downgoing.  Coordination: no dysmetria FNF  Gait: deferred   I personally reviewed the below data/images/labs:      CBC Full  -  ( 12 Oct 2021 06:47 )  WBC Count : 44.57 K/uL  RBC Count : 3.26 M/uL  Hemoglobin : 10.8 g/dL  Hematocrit : 31.9 %  Platelet Count - Automated : 203 K/uL  Mean Cell Volume : 97.9 fl  Mean Cell Hemoglobin : 33.1 pg  Mean Cell Hemoglobin Concentration : 33.9 gm/dL  Auto Neutrophil # : 9.63 K/uL  Auto Lymphocyte # : 28.93 K/uL  Auto Monocyte # : 2.81 K/uL  Auto Eosinophil # : 0.00 K/uL  Auto Basophil # : 0.00 K/uL  Auto Neutrophil % : 21.6 %  Auto Lymphocyte % : 64.9 %  Auto Monocyte % : 6.3 %  Auto Eosinophil % : 0.0 %  Auto Basophil % : 0.0 %      10-12    133<L>  |  97  |  41<H>  ----------------------------<  119<H>  4.3   |  24  |  1.60<H>    Ca    12.3<H>      12 Oct 2021 06:46    TPro  5.4<L>  /  Alb  2.7<L>  /  TBili  0.5  /  DBili  0.2  /  AST  90<H>  /  ALT  26  /  AlkPhos  148<H>  10-12        < from: CT Head No Cont (10.04.21 @ 09:07) >      EXAM:  CT CERVICAL SPINE                          EXAM:  CT BRAIN                                PROCEDURE DATE:  10/04/2021            INTERPRETATION:  CLINICAL STATEMENT: Trauma..    TECHNIQUE: CT of the head and cervical spine were performed withoutIV contrast. 3-D/MIP images obtained    COMPARISON: None.    FINDINGS:  Head:  There is mild diffuse parenchymal volume loss. There are areas of low attenuation in the periventricular white matter likely related to mild chronic microvascular ischemicchanges.    There is no acute intracranial hemorrhage, parenchymal mass, mass effect or midline shift. There is no acute territorial infarct. There is no hydrocephalus. Partial empty sella    The cranium is intact.    Mild mucosal thickening right maxillary sinus    Cervical spine:  Vertebral body heights intact. Straightening of the cervical lordosis. Grade 1 retrolisthesis C3 on C4. Grade 1 anterolisthesis of C5 on C6    There is no prevertebral soft tissue swelling. The odontoid is intact.    Evaluation of the spinal canal is limited on a CT exam.  Multilevel degenerative changes noted resulting in multilevel spinal canal stenosis and neural foraminal narrowing.    Partially visualized cervical lymphadenopathy noted with multiple enlarged lymph nodes. Calcifications noted within the thyroid gland.    IMPRESSION:  No acute intracranial hemorrhage.    No acute fracture cervical spine. If pain persists, follow-up MRI exam recommended    Partially visualized cervical lymphadenopathy suspicious for lymphoma or metastatic disease. Correlate clinically    --- End of Report ---                ROBI RUTH MD; Attending Radiologist  This document has been electronically signed. Oct  4 2021  9:22AM    < end of copied text >      < from: CT Chest No Cont (10.04.21 @ 20:17) >    EXAM:  CT CHEST                            PROCEDURE DATE:  10/04/2021            INTERPRETATION:  CLINICAL INFORMATION: Shortness of breath, evaluate for pneumonia    COMPARISON: CT abdomen and pelvis 10/4/2021    CONTRAST/COMPLICATIONS:  IV Contrast: None  Oral Contrast: None  Complications: No immediate complication    PROCEDURE:  CT of the Chest was performed.  Sagittal and coronal reformats were performed.    FINDINGS:    LUNGS AND AIRWAYS: Patent central airways.  Calcified nodule abutting the left anterior pleura (series 4, image 57). 3 mm nodule in the left upper lobe (series 4, image 39). Additional 2 mm nodule in the left upper lobe (series 4, image 58).  PLEURA: No pleural effusion.  MEDIASTINUM AND ERICA: Multiple enlarged supraclavicular lymph nodes. Additional axillary and mediastinal lymphadenopathy is seen.  VESSELS: Within normal limits.  HEART: Heart size is normal. No pericardial effusion.  CHEST WALL AND LOWER NECK: Calcifications within the bilateral thyroid lobes.  VISUALIZED UPPER ABDOMEN: Please refer to prior CT abdomen/pelvis.  BONES: Degenerative changes of the spine.    IMPRESSION:  Lymphadenoapthy described above is consistent with CLL as per patient history.    No CT evidence for pneumonia.                --- End of Report ---              KADEEM YE MD; Resident Radiology  This document has been electronically signed.  REYMUNDO VARGAS MD; Attending Radiologist  This document has been electronically signed. Oct  5 2021 12:09PM    < end of copied text >  < from: CT Abdomen and Pelvis w/ IV Cont (10.04.21 @ 09:30) >    EXAM:  CT ABDOMEN AND PELVIS IC                            PROCEDURE DATE:  10/04/2021            INTERPRETATION:  CLINICAL INFORMATION: Left lower quadrant pain    COMPARISON: None.    CONTRAST/COMPLICATIONS:  IV Contrast: Omnipaque 350  90 cc administered   10 cc discarded  Oral Contrast: None  Complications: None    PROCEDURE:  CT of the Abdomen and Pelvis was performed.  Sagittal and coronal reformats were performed.    FINDINGS:  LOWER CHEST: Bibasilar atelectasis. Partially imaged enlarged right axillary lymph nodes.    LIVER: Within normal limits.  BILE DUCTS: Normal caliber.  GALLBLADDER: Within normal limits.  SPLEEN: Within normal limits.  PANCREAS: Fatty atrophy of the pancreas without cutoff. No ductal dilatation or cutoff.  ADRENALS: Within normal limits.  KIDNEYS/URETERS: Within normal limits.    BLADDER/REPRODUCTIVE ORGANS: Multiloculated right adnexal cystic mass measuring 14.6 x 9.0 cm, which abuts the appendix. Left adnexal cyst measures 2.1 x 1.4 cm. Uterus is unremarkable.    BOWEL: No bowel obstruction. Appendix is normal. Colonic diverticulosis.  PERITONEUM: 2 small nodules are present adjacent to the cystic lesion measuring 4 and 6 mm in diameter, respectively (2:75 and 79), possibly peritoneal nodules or small pericolonic lymph nodes.  VESSELS: Within normal limits.  RETROPERITONEUM/LYMPH NODES: Enlarged bilateral inguinal, iliac, mesenteric, and retroperitoneal lymph nodes.  *  Reference left inguinal lymph node measures up to 4.0 x 2.5 cm (2:93).  *  Reference left periaortic lymph node measures up to 2.5 x 2.3 cm (2:45).  ABDOMINAL WALL: Within normal limits.  BONES: Degenerative changes. Lumbar dextroscoliosis.    IMPRESSION:  Enlarged bilateral inguinal, iliac, mesenteric, and retroperitoneal lymph nodes. Partially imaged enlarged right axillary lymph nodes. Primary consideration is lymphoma. Metastatic disease is not excluded. Dedicated chest imaging is recommended for full evaluation.    Cystic right adnexal mass measuring 14.6 x 9.0 cm, concerning for epithelial neoplasm.      --- End of Report ---      WALTER ALANIS MD; Resident Interventional Radiology  This document has been electronically signed.  DAREN MOHAN MD; Attending Radiologist  This document has been electronically signed. Oct  4 2021 11:14AM    < end of copied text >

## 2021-10-12 NOTE — PROGRESS NOTE ADULT - ASSESSMENT
80y/p F w/ PMH of CLL, Dementia, HTN, HLD, and RA was admitted due to AMS found to have UTI. Hematology was consulted due to CLL and ovarian mass.    #CLL  - outpatient f/u with Dr. Ayoub at [facility]  - was supposed to start ibrutinib as outpatient, will hold inpatient given infection    #Ovarian mass  - inguinal lymph node Bx showed CLL, primary ovarian CA cannot be excluded, especially considering the size and nature of the mass shown on PET/CT  - CLL can also predispose pt to malignancy as well  -  elevated  - appreciate gyn onc consult    #Hypercalcemia  - PTH low, may be related to ?ovarian malignancy  - would not expect hyperCa in setting of CLL  - IVF as per primary team  - pamidronate x1 10/12, can consider adding calcitonin

## 2021-10-12 NOTE — PROGRESS NOTE ADULT - SUBJECTIVE AND OBJECTIVE BOX
Hematology Oncology Follow-up    INTERVAL HPI/OVERNIGHT EVENTS:  Appears confused, unable to answer questions.    VITAL SIGNS:  T(F): 99.2 (10-12-21 @ 08:26)  HR: 89 (10-12-21 @ 08:26)  BP: 149/80 (10-12-21 @ 08:26)  RR: 22 (10-12-21 @ 08:26)  SpO2: 92% (10-12-21 @ 08:26)  Wt(kg): --    10-11-21 @ 07:01  -  10-12-21 @ 07:00  --------------------------------------------------------  IN: 600 mL / OUT: 950 mL / NET: -350 mL    10-12-21 @ 07:01  -  10-12-21 @ 16:26  --------------------------------------------------------  IN: 0 mL / OUT: 225 mL / NET: -225 mL          Review of Systems:  unable to obtain 2/2 medical condition    PHYSICAL EXAM:  Constitutional: NAD  Respiratory: symmetric chest rise, with normal respiratory effort  Cardiovascular: RRR  Gastrointestinal: soft, NTND  MSK: no obvious abnormalities  Neurological: AOx0  Skin: no rash, no echymoses, no petichiae      MEDICATIONS  (STANDING):  amitriptyline 10 milliGRAM(s) Oral at bedtime  ascorbic acid 500 milliGRAM(s) Oral daily  heparin   Injectable 5000 Unit(s) SubCutaneous every 8 hours  influenza   Vaccine 0.5 milliLiter(s) IntraMuscular once  lidocaine   4% Patch 1 Patch Transdermal every 24 hours  LORazepam     Tablet 2 milliGRAM(s) Oral two times a day  meropenem  IVPB 1000 milliGRAM(s) IV Intermittent every 12 hours  multivitamin 1 Tablet(s) Oral daily  pamidronate IVPB 60 milliGRAM(s) IV Intermittent once  perphenazine 2 milliGRAM(s) Oral at bedtime  polyethylene glycol 3350 17 Gram(s) Oral daily  senna 2 Tablet(s) Oral at bedtime  sodium chloride 0.9%. 1000 milliLiter(s) (50 mL/Hr) IV Continuous <Continuous>  sodium chloride 0.9%. 1000 milliLiter(s) (50 mL/Hr) IV Continuous <Continuous>  valACYclovir 500 milliGRAM(s) Oral two times a day  vancomycin  IVPB 1000 milliGRAM(s) IV Intermittent every 24 hours    MEDICATIONS  (PRN):  acetaminophen   Tablet .. 650 milliGRAM(s) Oral every 6 hours PRN Mild Pain (1 - 3), Moderate Pain (4 - 6)  acetaminophen   Tablet .. 650 milliGRAM(s) Oral every 6 hours PRN Temp greater or equal to 38C (100.4F)  albuterol/ipratropium for Nebulization 3 milliLiter(s) Nebulizer every 6 hours PRN Shortness of Breath and/or Wheezing      penicillin (Unknown)      LABS:                        10.8   44.57 )-----------( 203      ( 12 Oct 2021 06:47 )             31.9     10-12    133<L>  |  97  |  41<H>  ----------------------------<  119<H>  4.3   |  24  |  1.60<H>    Ca    12.3<H>      12 Oct 2021 06:46    TPro  5.4<L>  /  Alb  2.7<L>  /  TBili  0.5  /  DBili  0.2  /  AST  90<H>  /  ALT  26  /  AlkPhos  148<H>  10-12     Lactate Dehydrogenase, Serum: 514 U/L (10-12 @ 06:46)          Folate, Serum: 12.9 ng/mL (10-07-21 @ 11:48)  Vitamin B12, Serum: >2000 pg/mL (10-07-21 @ 11:48)      Bilirubin Direct, Serum: 0.2 (10-12-21 @ 06:46)      RADIOLOGY & ADDITIONAL TESTS:  Studies reviewed.

## 2021-10-13 DIAGNOSIS — R52 PAIN, UNSPECIFIED: ICD-10-CM

## 2021-10-13 DIAGNOSIS — R53.2 FUNCTIONAL QUADRIPLEGIA: ICD-10-CM

## 2021-10-13 DIAGNOSIS — Z71.89 OTHER SPECIFIED COUNSELING: ICD-10-CM

## 2021-10-13 DIAGNOSIS — C91.10 CHRONIC LYMPHOCYTIC LEUKEMIA OF B-CELL TYPE NOT HAVING ACHIEVED REMISSION: ICD-10-CM

## 2021-10-13 DIAGNOSIS — R50.9 FEVER, UNSPECIFIED: ICD-10-CM

## 2021-10-13 DIAGNOSIS — Z51.5 ENCOUNTER FOR PALLIATIVE CARE: ICD-10-CM

## 2021-10-13 LAB
ALBUMIN SERPL ELPH-MCNC: 2.6 G/DL — LOW (ref 3.3–5)
ALP SERPL-CCNC: 135 U/L — HIGH (ref 40–120)
ALT FLD-CCNC: 24 U/L — SIGNIFICANT CHANGE UP (ref 10–45)
ANION GAP SERPL CALC-SCNC: 10 MMOL/L — SIGNIFICANT CHANGE UP (ref 5–17)
ANISOCYTOSIS BLD QL: SIGNIFICANT CHANGE UP
AST SERPL-CCNC: 79 U/L — HIGH (ref 10–40)
BASOPHILS # BLD AUTO: 0.37 K/UL — HIGH (ref 0–0.2)
BASOPHILS NFR BLD AUTO: 0.9 % — SIGNIFICANT CHANGE UP (ref 0–2)
BILIRUB SERPL-MCNC: 0.4 MG/DL — SIGNIFICANT CHANGE UP (ref 0.2–1.2)
BUN SERPL-MCNC: 43 MG/DL — HIGH (ref 7–23)
CALCIUM SERPL-MCNC: 11.7 MG/DL — HIGH (ref 8.4–10.5)
CHLORIDE SERPL-SCNC: 98 MMOL/L — SIGNIFICANT CHANGE UP (ref 96–108)
CO2 SERPL-SCNC: 23 MMOL/L — SIGNIFICANT CHANGE UP (ref 22–31)
CREAT SERPL-MCNC: 1.4 MG/DL — HIGH (ref 0.5–1.3)
DACRYOCYTES BLD QL SMEAR: SLIGHT — SIGNIFICANT CHANGE UP
EOSINOPHIL # BLD AUTO: 0.37 K/UL — SIGNIFICANT CHANGE UP (ref 0–0.5)
EOSINOPHIL NFR BLD AUTO: 0.9 % — SIGNIFICANT CHANGE UP (ref 0–6)
GLUCOSE SERPL-MCNC: 97 MG/DL — SIGNIFICANT CHANGE UP (ref 70–99)
HCT VFR BLD CALC: 30.6 % — LOW (ref 34.5–45)
HGB BLD-MCNC: 9.9 G/DL — LOW (ref 11.5–15.5)
LYMPHOCYTES # BLD AUTO: 26 K/UL — HIGH (ref 1–3.3)
LYMPHOCYTES # BLD AUTO: 63.7 % — HIGH (ref 13–44)
MACROCYTES BLD QL: SIGNIFICANT CHANGE UP
MANUAL SMEAR VERIFICATION: SIGNIFICANT CHANGE UP
MCHC RBC-ENTMCNC: 32.2 PG — SIGNIFICANT CHANGE UP (ref 27–34)
MCHC RBC-ENTMCNC: 32.4 GM/DL — SIGNIFICANT CHANGE UP (ref 32–36)
MCV RBC AUTO: 99.7 FL — SIGNIFICANT CHANGE UP (ref 80–100)
MONOCYTES # BLD AUTO: 2.9 K/UL — HIGH (ref 0–0.9)
MONOCYTES NFR BLD AUTO: 7.1 % — SIGNIFICANT CHANGE UP (ref 2–14)
NEUTROPHILS # BLD AUTO: 10.49 K/UL — HIGH (ref 1.8–7.4)
NEUTROPHILS NFR BLD AUTO: 24.8 % — LOW (ref 43–77)
NEUTS BAND # BLD: 0.9 % — SIGNIFICANT CHANGE UP (ref 0–8)
NRBC # BLD: 4 /100 — HIGH (ref 0–0)
OVALOCYTES BLD QL SMEAR: SLIGHT — SIGNIFICANT CHANGE UP
PLAT MORPH BLD: NORMAL — SIGNIFICANT CHANGE UP
PLATELET # BLD AUTO: 194 K/UL — SIGNIFICANT CHANGE UP (ref 150–400)
POIKILOCYTOSIS BLD QL AUTO: SLIGHT — SIGNIFICANT CHANGE UP
POLYCHROMASIA BLD QL SMEAR: SLIGHT — SIGNIFICANT CHANGE UP
POTASSIUM SERPL-MCNC: 4.5 MMOL/L — SIGNIFICANT CHANGE UP (ref 3.5–5.3)
POTASSIUM SERPL-SCNC: 4.5 MMOL/L — SIGNIFICANT CHANGE UP (ref 3.5–5.3)
PROT SERPL-MCNC: 5.2 G/DL — LOW (ref 6–8.3)
RBC # BLD: 3.07 M/UL — LOW (ref 3.8–5.2)
RBC # FLD: 20.9 % — HIGH (ref 10.3–14.5)
RBC BLD AUTO: ABNORMAL
SCHISTOCYTES BLD QL AUTO: SLIGHT — SIGNIFICANT CHANGE UP
SMUDGE CELLS # BLD: PRESENT — SIGNIFICANT CHANGE UP
SODIUM SERPL-SCNC: 131 MMOL/L — LOW (ref 135–145)
TARGETS BLD QL SMEAR: SLIGHT — SIGNIFICANT CHANGE UP
VARIANT LYMPHS # BLD: 1.7 % — SIGNIFICANT CHANGE UP (ref 0–6)
WBC # BLD: 40.81 K/UL — CRITICAL HIGH (ref 3.8–10.5)
WBC # FLD AUTO: 40.81 K/UL — CRITICAL HIGH (ref 3.8–10.5)

## 2021-10-13 PROCEDURE — 99223 1ST HOSP IP/OBS HIGH 75: CPT

## 2021-10-13 RX ORDER — ACETAMINOPHEN 500 MG
1000 TABLET ORAL ONCE
Refills: 0 | Status: COMPLETED | OUTPATIENT
Start: 2021-10-13 | End: 2021-10-13

## 2021-10-13 RX ORDER — SODIUM CHLORIDE 9 MG/ML
1000 INJECTION INTRAMUSCULAR; INTRAVENOUS; SUBCUTANEOUS
Refills: 0 | Status: DISCONTINUED | OUTPATIENT
Start: 2021-10-13 | End: 2021-10-17

## 2021-10-13 RX ADMIN — Medication 500 MILLIGRAM(S): at 12:14

## 2021-10-13 RX ADMIN — Medication 250 MILLIGRAM(S): at 12:20

## 2021-10-13 RX ADMIN — HEPARIN SODIUM 5000 UNIT(S): 5000 INJECTION INTRAVENOUS; SUBCUTANEOUS at 21:15

## 2021-10-13 RX ADMIN — Medication 10 MILLIGRAM(S): at 21:15

## 2021-10-13 RX ADMIN — MEROPENEM 100 MILLIGRAM(S): 1 INJECTION INTRAVENOUS at 05:16

## 2021-10-13 RX ADMIN — LIDOCAINE 1 PATCH: 4 CREAM TOPICAL at 02:41

## 2021-10-13 RX ADMIN — Medication 650 MILLIGRAM(S): at 01:29

## 2021-10-13 RX ADMIN — VALACYCLOVIR 500 MILLIGRAM(S): 500 TABLET, FILM COATED ORAL at 05:25

## 2021-10-13 RX ADMIN — SODIUM CHLORIDE 50 MILLILITER(S): 9 INJECTION INTRAMUSCULAR; INTRAVENOUS; SUBCUTANEOUS at 17:39

## 2021-10-13 RX ADMIN — PERPHENAZINE 2 MILLIGRAM(S): 8 TABLET, FILM COATED ORAL at 21:15

## 2021-10-13 RX ADMIN — LIDOCAINE 1 PATCH: 4 CREAM TOPICAL at 20:00

## 2021-10-13 RX ADMIN — LIDOCAINE 1 PATCH: 4 CREAM TOPICAL at 16:21

## 2021-10-13 RX ADMIN — Medication 400 MILLIGRAM(S): at 17:39

## 2021-10-13 RX ADMIN — HEPARIN SODIUM 5000 UNIT(S): 5000 INJECTION INTRAVENOUS; SUBCUTANEOUS at 05:24

## 2021-10-13 RX ADMIN — Medication 1 TABLET(S): at 12:14

## 2021-10-13 RX ADMIN — HEPARIN SODIUM 5000 UNIT(S): 5000 INJECTION INTRAVENOUS; SUBCUTANEOUS at 12:15

## 2021-10-13 RX ADMIN — Medication 1000 MILLIGRAM(S): at 18:09

## 2021-10-13 RX ADMIN — Medication 650 MILLIGRAM(S): at 02:00

## 2021-10-13 RX ADMIN — MEROPENEM 100 MILLIGRAM(S): 1 INJECTION INTRAVENOUS at 17:39

## 2021-10-13 NOTE — CONSULT NOTE ADULT - PROBLEM SELECTOR RECOMMENDATION 9
Followed at [facility]  Was to start oral chemo
Fellow Note    Patient seen and examined. Agree with above. Attempted to see patient, but she was off floor having Dopplers performed.     VS reviewed  Labs reviewed    Patient with known adnexal mass (indeterminate FDG avidity on recent PET) with biopsy proven CLL admitted with urosepsis  Would defer any surgical intervention unless patient begins experiencing severe pain from the mass.   Will follow-up at [facility] to start chemotherapy as an outpatient after infection clears  Will follow-up with Dr. Leyva as an outpatient.    Ajay MUÑOZ

## 2021-10-13 NOTE — PROGRESS NOTE ADULT - SUBJECTIVE AND OBJECTIVE BOX
CC: f/u for fever    Patient reports: she is attentive, mildy confused even with Arabic speaking people but able to follow simple commands    REVIEW OF SYSTEMS:  All other review of systems negative (Comprehensive ROS)    Antimicrobials Day #  :day 2  meropenem  IVPB 1000 milliGRAM(s) IV Intermittent every 12 hours  valACYclovir 500 milliGRAM(s) Oral two times a day  vancomycin  IVPB 1000 milliGRAM(s) IV Intermittent every 24 hours    Other Medications Reviewed  MEDICATIONS  (STANDING):  amitriptyline 10 milliGRAM(s) Oral at bedtime  ascorbic acid 500 milliGRAM(s) Oral daily  heparin   Injectable 5000 Unit(s) SubCutaneous every 8 hours  influenza   Vaccine 0.5 milliLiter(s) IntraMuscular once  lidocaine   4% Patch 1 Patch Transdermal every 24 hours  LORazepam     Tablet 2 milliGRAM(s) Oral two times a day  meropenem  IVPB 1000 milliGRAM(s) IV Intermittent every 12 hours  multivitamin 1 Tablet(s) Oral daily  perphenazine 2 milliGRAM(s) Oral at bedtime  polyethylene glycol 3350 17 Gram(s) Oral daily  senna 2 Tablet(s) Oral at bedtime  sodium chloride 0.9%. 1000 milliLiter(s) (50 mL/Hr) IV Continuous <Continuous>  sodium chloride 0.9%. 1000 milliLiter(s) (50 mL/Hr) IV Continuous <Continuous>  valACYclovir 500 milliGRAM(s) Oral two times a day  vancomycin  IVPB 1000 milliGRAM(s) IV Intermittent every 24 hours    T(F): 98.2 (10-13-21 @ 09:18), Max: 98.2 (10-12-21 @ 18:00)  HR: 86 (10-13-21 @ 09:18)  BP: 146/70 (10-13-21 @ 09:18)  RR: 18 (10-13-21 @ 09:18)  SpO2: 91% (10-13-21 @ 09:18)  Wt(kg): --    PHYSICAL EXAM:  General: alert, no acute distress  Eyes:  anicteric, no conjunctival injection, no discharge  Oropharynx: no lesions or injection 	  Neck: supple, without adenopathy  Lungs: clear to auscultation, decreased at bases  Heart: regular rate and rhythm; no murmur, rubs or gallops  Abdomen: soft, nondistended, nontender, without mass or organomegaly  Skin: no rash  Extremities: no clubbing, cyanosis, trace edema  Neurologic: lethargic, confused-? baseline-, moves all extremities    LAB RESULTS:                        9.9    40.81 )-----------( 194      ( 13 Oct 2021 07:22 )             30.6     10-13    131<L>  |  98  |  43<H>  ----------------------------<  97  4.5   |  23  |  1.40<H>    Ca    11.7<H>      13 Oct 2021 07:22    TPro  5.2<L>  /  Alb  2.6<L>  /  TBili  0.4  /  DBili  x   /  AST  79<H>  /  ALT  24  /  AlkPhos  135<H>  10-13    LIVER FUNCTIONS - ( 13 Oct 2021 07:22 )  Alb: 2.6 g/dL / Pro: 5.2 g/dL / ALK PHOS: 135 U/L / ALT: 24 U/L / AST: 79 U/L / GGT: x             MICROBIOLOGY:  RECENT CULTURES:  10-11 @ 18:36 .Blood Blood-Peripheral     No growth to date.      10-11 @ 18:25 Clean Catch Clean Catch (Midstream)     <10,000 CFU/mL Normal Urogenital Kaylyn          RADIOLOGY REVIEWED:    < from: Xray Chest 1 View- PORTABLE-Urgent (Xray Chest 1 View- PORTABLE-Urgent .) (10.11.21 @ 20:51) >    IMPRESSION:  Patchy atelectasis at the left lung base.  Enteric tube tip is coiled inside the stomach.    < end of copied text >  < from: VA Duplex Lower Ext Vein Scan, Bilat (10.08.21 @ 11:44) >  IMPRESSION:  No evidence of deep venous thrombosis in either lower extremity.    MRI of brain results are pending

## 2021-10-13 NOTE — CONSULT NOTE ADULT - PROBLEM SELECTOR RECOMMENDATION 6
Met face to face with daughter Sharla (alternate HCP) at bedside.   Patient sitting up in chair, speaks our mutual language , [place] citizen, . I was able to  ask patient questions regarding HCP.  Patient has capacity and clearly delineates daughter Janie as primary HCP and Sharla as alternate.   Document completed, copy placed in chart, original given to Sharla .  Patient defers decisions to her daughter Janie and Sharla .   I spoke with Janie by phone and discussed advance directives.  She was appreciative of the discussion but would like to think about it and discuss further with her siblings.  Family currently wish to continue disease modifying therapies and  medical management and work up.   Will continue to follow

## 2021-10-13 NOTE — PROGRESS NOTE ADULT - ASSESSMENT
Patient is a 80 PMHx of new onset CLL, with possible staging of ovarian cancer, HTN, HLD, CC s/p fall and fevers.  Per daughter, pt lives at home alone, fell two times while getting out of bed. States she fell onto both hands and knees while rolling out of bed. No LOC.  Denied any lightheadedness, dizziness at the time of fall.  Currently states she has some dizziness, like she would fall over if she had to walk. Was brought in today due to having a fever. PT complaining of SOB as well, and has pain in both knees. Denies cough, headache, chest pain, nausea, vomiting. Has not started any chemotherapy per daughter    #Leukocytosis with fever  -Patient is afebrile today 10/13  -NPO, NGT put in place, CXR results noted  -As per ID patient continue with Vancomycin and Meropenam for elevated WBC count, will monitor  -As per ID, may need lumbar puncture    -culture results no growth to date   -Procalcitonin level noted   -MR brain completed 10/12, results noted   -Speech and Swallow evaluation      # AMS and dizziness   CT head noted  fall precautions  orthostatics  Neurology eval appreciated follow up recs  -As per neuro brain MRI w/ and w/o IV contrast completed 10/12, results noted     # Sepsis  R/O sepsis   UTI vs tumor fever vs other etiologies   Pan cx   Completed course of Rocephin, stopped on 10/08 as per ID  monitor clinically   ID eval appreciated   Hydration as tolerated  Pan CT noted      # HARJIT   HARJIT will monitor renal function  -Elevated calcium, as per renal Lasix is on hold due to likely dehydration, repeat BMP to monitor, and limit fluids to 50cc/hr for 1L  Monitor renal function  I and Os   As per nephrology, monitor renal function and avoid nephrotoxic   -As per renal, monitor Na levels, free water restriction to 1L/day  - Endo eval       PTH level and Calcium levels noted. Will get endo evaluation     # Ovarian Ca, as per GYN/Onc there is no ovarian CA   likely due to her CLL   seen on CT   onc follow up   Patient follows with [facility], outpatient follow up     # HTN   No on an antihypertensive medication   Monitor BP      # HLD   lipid panel     #Wheezing, fluid overload likely   Cont Duonebs  D/C IVF  will adjust diuresis, on hold for now   BNP results noted. Will follow  Echo ordered  Card eval appreciated     DVT and GI PPX        Patient is a 80 PMHx of new onset CLL, with possible staging of ovarian cancer, HTN, HLD, CC s/p fall and fevers.  Per daughter, pt lives at home alone, fell two times while getting out of bed. States she fell onto both hands and knees while rolling out of bed. No LOC.  Denied any lightheadedness, dizziness at the time of fall.  Currently states she has some dizziness, like she would fall over if she had to walk. Was brought in today due to having a fever. PT complaining of SOB as well, and has pain in both knees. Denies cough, headache, chest pain, nausea, vomiting. Has not started any chemotherapy per daughter    #Leukocytosis with fever  -Patient is afebrile today 10/13  -NPO, NGT put in place, CXR results noted  -As per ID patient continue with Vancomycin and Meropenam for elevated WBC count, will monitor  -As per ID, may need lumbar puncture    -culture results no growth to date   -Procalcitonin level noted   -MR brain completed 10/12, results noted   -Speech and Swallow evaluation      # AMS and dizziness   CT head noted  fall precautions  orthostatics  Neurology eval appreciated follow up recs  -As per neuro brain MRI w/ and w/o IV contrast completed 10/12, results noted     # Sepsis  R/O sepsis   UTI vs tumor fever vs other etiologies   Pan cx   Completed course of Rocephin, stopped on 10/08 as per ID  monitor clinically   ID eval appreciated   Hydration as tolerated  Pan CT noted      # HARJIT   HARJIT will monitor renal function  -Elevated calcium, as per renal Lasix is on hold due to likely dehydration, repeat BMP to monitor, and limit fluids to 50cc/hr for 1L  Monitor renal function  I and Os   As per nephrology, monitor renal function and avoid nephrotoxic   -As per renal, monitor Na levels, free water restriction to 1L/day  - Endo eval     #Hypercalcemia   -PTH level and Calcium levels noted. Will get endo evaluation   -As per heme/onc, IVF started BS 50cc/Hr or 24 hours      # Ovarian Ca, as per GYN/Onc there is no ovarian CA   likely due to her CLL   seen on CT   onc follow up   Patient follows with [facility], outpatient follow up     # HTN   No on an antihypertensive medication   Monitor BP      # HLD   lipid panel     #Wheezing, fluid overload likely   Cont Duonebs  D/C IVF  will adjust diuresis, on hold for now   BNP results noted. Will follow  Echo ordered  Card eval appreciated     DVT and GI PPX

## 2021-10-13 NOTE — PROGRESS NOTE ADULT - SUBJECTIVE AND OBJECTIVE BOX
Dr. Lowe  Office [phone]  Cell [phone]  Tanika SAMS  Cell [phone]    RENAL PROGRESS NOTE: DATE OF SERVICE 10-13-21 @ 18:24    Patient is a 80y old  Female who presents with a chief complaint of fever weakness (13 Oct 2021 15:12)      Patient seen and examined at bedside. No chest pain/sob    VITALS:  T(F): 98.4 (10-13-21 @ 17:24), Max: 98.5 (10-13-21 @ 13:47)  HR: 77 (10-13-21 @ 17:24)  BP: 147/69 (10-13-21 @ 17:24)  RR: 17 (10-13-21 @ 17:24)  SpO2: 95% (10-13-21 @ 17:24)  Wt(kg): --    10-12 @ 07:01  -  10-13 @ 07:00  --------------------------------------------------------  IN: 1250 mL / OUT: 850 mL / NET: 400 mL    10-13 @ 07:01  -  10-13 @ 18:24  --------------------------------------------------------  IN: 0 mL / OUT: 1000 mL / NET: -1000 mL          PHYSICAL EXAM:  Constitutional: NAD  Neck: No JVD  Respiratory: CTAB, no wheezes, rales or rhonchi  Cardiovascular: S1, S2, RRR  Gastrointestinal: BS+, soft, NT/ND  Extremities: No peripheral edema    Hospital Medications:   MEDICATIONS  (STANDING):  amitriptyline 10 milliGRAM(s) Oral at bedtime  ascorbic acid 500 milliGRAM(s) Oral daily  heparin   Injectable 5000 Unit(s) SubCutaneous every 8 hours  influenza   Vaccine 0.5 milliLiter(s) IntraMuscular once  lidocaine   4% Patch 1 Patch Transdermal every 24 hours  LORazepam     Tablet 2 milliGRAM(s) Oral two times a day  meropenem  IVPB 1000 milliGRAM(s) IV Intermittent every 12 hours  multivitamin 1 Tablet(s) Oral daily  perphenazine 2 milliGRAM(s) Oral at bedtime  polyethylene glycol 3350 17 Gram(s) Oral daily  senna 2 Tablet(s) Oral at bedtime  sodium chloride 0.9%. 1000 milliLiter(s) (50 mL/Hr) IV Continuous <Continuous>  sodium chloride 0.9%. 1000 milliLiter(s) (50 mL/Hr) IV Continuous <Continuous>  sodium chloride 0.9%. 1000 milliLiter(s) (50 mL/Hr) IV Continuous <Continuous>  valACYclovir 500 milliGRAM(s) Oral two times a day  vancomycin  IVPB 1000 milliGRAM(s) IV Intermittent every 24 hours      LABS:  10-13    131<L>  |  98  |  43<H>  ----------------------------<  97  4.5   |  23  |  1.40<H>    Ca    11.7<H>      13 Oct 2021 07:22    TPro  5.2<L>  /  Alb  2.6<L>  /  TBili  0.4  /  DBili      /  AST  79<H>  /  ALT  24  /  AlkPhos  135<H>  10-13    Creatinine Trend: 1.40 <--, 1.60 <--, 1.55 <--, 1.55 <--, 1.55 <--, 1.31 <--, 1.20 <--    Albumin, Serum: 2.6 g/dL (10-13 @ 07:22)                              9.9    40.81 )-----------( 194      ( 13 Oct 2021 07:22 )             30.6     Urine Studies:  Urinalysis - [10-05-21 @ 17:05]      Color Yellow / Appearance Clear / SG 1.033 / pH 6.0      Gluc Negative / Ketone Negative  / Bili Negative / Urobili Negative       Blood Moderate / Protein 100 / Leuk Est Negative / Nitrite Negative      RBC 2 / WBC 3 / Hyaline 1 / Gran  / Sq Epi  / Non Sq Epi 2 / Bacteria Negative    Urine Creatinine 79      [10-10-21 @ 17:00]  Urine Sodium 60      [10-07-21 @ 12:53]  Urine Urea Nitrogen 357      [10-10-21 @ 21:55]  Urine Osmolality 474      [10-07-21 @ 12:53]    PTH -- (Ca 12.2)      [10-11-21 @ 08:51]   8  Vitamin D (25OH) 65.1      [10-12-21 @ 08:57]  TSH 1.15      [10-05-21 @ 16:30]  Lipid: chol 87, , HDL 15, LDL --      [10-05-21 @ 16:30]      Free Light Chains: kappa 22.16, lambda 2.82, ratio = 7.86      [10-07 @ 17:52]    RADIOLOGY & ADDITIONAL STUDIES:

## 2021-10-13 NOTE — PROGRESS NOTE ADULT - ASSESSMENT
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   She was hospitalized at [facility] a few months ago for a cardiac evaluation, which was negative, per daughter    Admitted to [facility] with fever and weakness associated with 2 falls on 10/03/21.  She developed fever on 10/03 with accelerated weakness and brought to ER for evaluation.   Fever to 101.4 in the ER prompted CTX.  Etiology of fever not clear although "B" symptoms of lymphoma possible but her decline in functional status seemed acute.   Reported abdominal pain and bloating but CT scan without acute pathology.   UA with 14 WBCs, urine cx with Klebsiella - doubt UTI   Blood cx NGTD   Started on empiric Ceftriaxone  CT chest without PNA  WNV serology neg  Quant TB test neg  Repeat RVP still neg   Repeated urine cx neg & blood cx NGTD  Last fever was on 10/05/21   Fevers resolved, but remains weak & deconditioned, interaction remains limited  Empiric Ceftriaxone was stopped on 10/08/21    She was seen by derm on 10/07 for new onset of clean based bilateral buttock & lower sacral area ulcers - started on Valtrex & ulcers were swabbed for HSV & CMV by derm  Wound cx of these ulcers recovered E faecalis - ulcers do not appear to be infected & E faecalis represents colonizing winter   Viral swab is negative for HSV & VZV    Fever in evening of 10/11 to 102.8  She was started on vanco and meropenem-temps are down  She has failed swallow evaluation  CXR with no clear infiltrates, MRI of brain pending.  Heme Onc note appreciated, CLL and possible Ovarian  malignancy, GYN to decide on biopsy  Hypercalcemia being treated  Suggest:  1.Valtrex per derm  2.Vanco/meropenem empiric, will limit to 3-5 days if blood cultures  remain negative, possible small aspiration  3.Await brain MRI  4.Goals of care will be important  5.[place] guarded

## 2021-10-13 NOTE — PROGRESS NOTE ADULT - SUBJECTIVE AND OBJECTIVE BOX
Name of Patient : PREM KAY  MRN: [id number]  Date of visit: 10-13-21 @ 14:47      Subjective: Patient seen and examined. No new events except as noted.       MEDICATIONS:  MEDICATIONS  (STANDING):  amitriptyline 10 milliGRAM(s) Oral at bedtime  ascorbic acid 500 milliGRAM(s) Oral daily  heparin   Injectable 5000 Unit(s) SubCutaneous every 8 hours  influenza   Vaccine 0.5 milliLiter(s) IntraMuscular once  lidocaine   4% Patch 1 Patch Transdermal every 24 hours  LORazepam     Tablet 2 milliGRAM(s) Oral two times a day  meropenem  IVPB 1000 milliGRAM(s) IV Intermittent every 12 hours  multivitamin 1 Tablet(s) Oral daily  perphenazine 2 milliGRAM(s) Oral at bedtime  polyethylene glycol 3350 17 Gram(s) Oral daily  senna 2 Tablet(s) Oral at bedtime  sodium chloride 0.9%. 1000 milliLiter(s) (50 mL/Hr) IV Continuous <Continuous>  sodium chloride 0.9%. 1000 milliLiter(s) (50 mL/Hr) IV Continuous <Continuous>  valACYclovir 500 milliGRAM(s) Oral two times a day  vancomycin  IVPB 1000 milliGRAM(s) IV Intermittent every 24 hours      PHYSICAL EXAM:  T(C): 36.9 (10-13-21 @ 13:47), Max: 36.9 (10-13-21 @ 13:47)  HR: 77 (10-13-21 @ 13:47) (73 - 86)  BP: 145/69 (10-13-21 @ 13:47) (116/69 - 155/73)  RR: 18 (10-13-21 @ 13:47) (18 - 20)  SpO2: 95% (10-13-21 @ 13:47) (91% - 95%)  Wt(kg): --  I&O's Summary    12 Oct 2021 07:01  -  13 Oct 2021 07:00  --------------------------------------------------------  IN: 1250 mL / OUT: 850 mL / NET: 400 mL    13 Oct 2021 07:01  -  13 Oct 2021 14:47  --------------------------------------------------------  IN: 0 mL / OUT: 1000 mL / NET: -1000 mL          Appearance: Awake   HEENT:  PERRLA   Lymphatic: No lymphadenopathy   Cardiovascular: Normal S1 S2, no JVD  Respiratory: normal effort , clear  Gastrointestinal:  Soft, Non-tender  Skin: No rashes,  warm to touch  Psychiatry:  Mood & affect appropriate  Musculuskeletal: No edema      All labs, Imaging and EKGs personally reviewed                           9.9    40.81 )-----------( 194      ( 13 Oct 2021 07:22 )             30.6               10-13    131<L>  |  98  |  43<H>  ----------------------------<  97  4.5   |  23  |  1.40<H>    Ca    11.7<H>      13 Oct 2021 07:22    TPro  5.2<L>  /  Alb  2.6<L>  /  TBili  0.4  /  DBili  x   /  AST  79<H>  /  ALT  24  /  AlkPhos  135<H>  10-13               10-12-21 @ 07:01  -  10-13-21 @ 07:00  --------------------------------------------------------  IN: 1250 mL / OUT: 850 mL / NET: 400 mL    10-13-21 @ 07:01  -  10-13-21 @ 14:47  --------------------------------------------------------  IN: 0 mL / OUT: 1000 mL / NET: -1000 mL      < from: MR Head w/wo IV Cont (10.12.21 @ 22:23) >  IMPRESSION:  Mild diffuse dural thickening/enhancement along the hemispheric convexities. While this may be secondary to recent lumbar puncture, other inflammatory or neoplastic etiologies are not excluded. Continued follow-up is advised.    Otherwise no mass effect or acute cerebral ischemia.    --- End of Report ---    < end of copied text >

## 2021-10-13 NOTE — CONSULT NOTE ADULT - CONSULT REQUESTED DATE/TIME
04-Oct-2021 17:31
07-Oct-2021 15:19
06-Oct-2021
05-Oct-2021 14:50
07-Oct-2021 10:31
07-Oct-2021 18:55
04-Oct-2021 19:37
13-Oct-2021

## 2021-10-13 NOTE — CHART NOTE - NSCHARTNOTEFT_GEN_A_CORE
Patient is a 80 PMHx of new onset CLL, with possible staging of ovarian cancer, HTN, HLD, CC s/p fall and fevers.  Per daughter, pt lives at home alone, fell two times while getting out of bed. States she fell onto both hands and knees while rolling out of bed. No LOC.  Denied any lightheadedness, dizziness at the time of fall.  Currently states she has some dizziness, like she would fall over if she had to walk. Was brought in today due to having a fever. PT complaining of SOB as well, and has pain in both knees. Denies cough, headache, chest pain, nausea, vomiting. Has not started any chemotherapy per daughter. R/o sepsis. + UTI, + HARJIT. ID: Etiology of fever not clear. She has abdominal pain and bloating although CT scan is without acute pathology. She does not look toxic or septic. CXR negative, CT of A/P negative for source of infection. Heme Onc evaluation- possible lymphoma fever. Neurology: AMS likely 2/2 infection. CTH and CT C spine unremarkable. Rx MRI brain. RD: Granddaughter reports pt has dentures but doesn't wear them all the time, believes pt consumes softer foods. Denies pt with issues swallowing. Amendable to trying soft diet. 10/6 and 10/7: ID: Pt wheezing and SOB. CXR with mild pulm vasc congestion. Medicine: Likely Overload  2/2 IVF. RN note: sores on b/l buttock assessed by wound, as per family at bedside shave obtained and dx w herpes. Derm: Ulcers of the genital region- High suspicion for viral etiology given patient's history, immunosuppression (on MTX for RA), and clinical appearance of lesions. Swabbed lesions for HSV and CMV pcr. Viral swab is negative for HSV & VZV. Per ID, likely decub ulcers. Heme/onc: Family reported that she has had a wt loss of 40lbs in the last year. During the last two months, they noticed a rapid decline in her function. She was complaining about feeling more tired, sleeps more and gets up more often during her sleep. Gyn/Onc: Pt's family requests that CLL and "cancer" not be mentioned to patient. Defer any surgical intervention unless patient begins experiencing severe pain from the mass. Per GYN/Onc there is no ovarian CA, likely due to her CLL. 10/9 ID: CT chest without PNA, WNV serology neg, Quant TB test neg, Repeat RVP still neg. Fevers have resolved, but weak & deconditioned, interaction limited 10/10: RN note: Increased confusion. 10/11: RN note: pt refusing to eat dinner, any attempts made, pt would pocket food and not swallow, coughs when thin liquids given. Medicine:  + fever, patient found with food on the side of her mouth with her morning pills. Patient seen at bedside, patient is lethargic moves upper extremities at times. NPO, NGT. Possible aspiration (pocketing food on side of mouth). Chest x-ray to r/o aspiration. + leukocytosis. Abx. 10/12 MRI brain: Mild diffuse dural thickening/enhancement along the hemispheric convexities. While this may be secondary to recent lumbar puncture, other inflammatory or neoplastic etiologies are not excluded. Continued follow-up is advised. Neuro: MRI findings c/w possible neoplastic process more likely given hx.  patient has not had LP Patient is a 80 PMHx of new onset CLL, with possible staging of ovarian cancer, HTN, HLD, CC s/p fall and fevers.  Per daughter, pt lives at home alone, fell two times while getting out of bed. States she fell onto both hands and knees while rolling out of bed. No LOC.  Denied any lightheadedness, dizziness at the time of fall.  Currently states she has some dizziness, like she would fall over if she had to walk. Was brought in today due to having a fever. PT complaining of SOB as well, and has pain in both knees. Denies cough, headache, chest pain, nausea, vomiting. Has not started any chemotherapy per daughter. R/o sepsis. + UTI, + HARJIT. ID: Etiology of fever not clear. She has abdominal pain and bloating although CT scan is without acute pathology. She does not look toxic or septic. CXR negative, CT of A/P negative for source of infection. Heme Onc evaluation- possible lymphoma fever. Neurology: AMS likely 2/2 infection. CTH and CT C spine unremarkable. Rx MRI brain. RD: Granddaughter reports pt has dentures but doesn't wear them all the time, believes pt consumes softer foods. Denies pt with issues swallowing. Amendable to trying soft diet. 10/6 and 10/7: ID: Pt wheezing and SOB. CXR with mild pulm vasc congestion. Medicine: Likely Overload  2/2 IVF. RN note: sores on b/l buttock assessed by wound, as per family at bedside shave obtained and dx w herpes. Derm: Ulcers of the genital region- High suspicion for viral etiology given patient's history, immunosuppression (on MTX for RA), and clinical appearance of lesions. Swabbed lesions for HSV and CMV pcr. Viral swab is negative for HSV & VZV. Per ID, likely decub ulcers. Heme/onc: Family reported that she has had a wt loss of 40lbs in the last year. During the last two months, they noticed a rapid decline in her function. She was complaining about feeling more tired, sleeps more and gets up more often during her sleep. Gyn/Onc: Pt's family requests that CLL and "cancer" not be mentioned to patient. Defer any surgical intervention unless patient begins experiencing severe pain from the mass. Per GYN/Onc there is no ovarian CA, likely due to her CLL. 10/9 ID: CT chest without PNA, WNV serology neg, Quant TB test neg, Repeat RVP still neg. Fevers have resolved, but weak & deconditioned, interaction limited 10/10: RN note: Increased confusion. 10/11: RN note: pt refusing to eat dinner, any attempts made, pt would pocket food and not swallow, coughs when thin liquids given. Medicine:  + fever, patient found with food on the side of her mouth with her morning pills. Patient seen at bedside, patient is lethargic moves upper extremities at times. NPO, NGT. Possible aspiration (pocketing food on side of mouth). Chest x-ray to r/o aspiration. + leukocytosis. Abx. 10/12 MRI brain: Mild diffuse dural thickening/enhancement along the hemispheric convexities. While this may be secondary to recent lumbar puncture, other inflammatory or neoplastic etiologies are not excluded. Continued follow-up is advised. Neuro: MRI findings c/w possible neoplastic process more likely given hx.  patient has not had LP    Pt seen for bedside swallow evaluation 10/12/21. Pt presented with an oral and suspected pharyngeal dysphagia superimposed upon a reduced mental status. Recommendations included NPO, with non-oral nutrition/hydration/medications.     Call received from pt's daughter Janie and LATRELL Amos requesting re-assessment today.    Pt seen for repeat swallow assessment. Pt with improved alertness from previous assessment. Received OOB in chair. +2L NC. Grossly A&Ox3 (to self, to month with field of two choices, to place with field of two choices and maximal repetition). Pt [place] speaking. Daughter present to provide translation. As per family, pt speaks a dialect of [place] and  phone refused. Pt with limited verbalizations. Inconsistent responsiveness. Poor command following. Vocal quality of reduced volume and suspect reduced breath support for phonation. Pt overall weak, deconditioned appearance. OME revealed reduced strength/rate/ROM of oral musculature suspect confounded by reduced effort and command following. Pt noted with baseline cough. Provided with trials of crushed ice chips, thin puree, thick puree, honey thick liquid via tsp. Pt presents with an oral and suspected pharyngeal dysphagia with lingual pumping, delayed oral transit and reduced A-P transport with thick puree texture. Pharyngeal phase characterized by intermittent delay in trigger of the pharyngeal swallow. Pt coughing throughout PO trials. Baseline cough makes behavioral analysis of swallow function difficult to a degree. Therefore, would recommend instrumental swallow assessment to fully r/o aspiration and determine least restrictive diet.      Findings and recommendations discussed with pt, pt's two daughters (one via Face Time), LATRELL Amos and JUAN Moore.    Pt/family expressed possible desire to initiate an oral diet prior to MBS. Pt/family extensively educated re: role of SLP, rationale for NPO and MBS recommendations and risks of aspiration (i.e. pna, respiratory distress, death). Would recommend further discussion by MD with pt/family re: feeding GOC. Of pt/family wish to initiate an oral diet despite the risks of aspiration a conservative diet is Dysphagia 1 with honey thick liquid via tsp, this may help to reduce but does not eliminate the risk of aspiration. LATRELL Amos made aware.      Recommendations:   NPO, with non-oral nutrition/hydration/medications.   MBS. MD, please write order for Modified Barium Swallow Study. Will schedule exam upon receipt of order in Radiology.   Maintain good oral hygiene.  Maintain aspiration precautions for secretions and if enteral feeds are initiated.         Anyi Quispe MA CCC/SLP  Speech Language Pathologist  [phone]

## 2021-10-13 NOTE — CONSULT NOTE ADULT - PROBLEM SELECTOR RECOMMENDATION 3
Agree with Tylenol and lidocaine patches  Avoid sedation , hold off on opioids not indicated at this time

## 2021-10-13 NOTE — CONSULT NOTE ADULT - SUBJECTIVE AND OBJECTIVE BOX
HPI:  Patient is a 80 PMHx of new onset CLL, with possible staging of ovarian cancer, HTN, HLD, CC s/p fall and fevers.  Per daughter, pt lives at home alone, fell two times while getting out of bed. States she fell onto both hands and knees while rolling out of bed. No LOC.  Denied any lightheadedness, dizziness at the time of fall.  Currently states she has some dizziness, like she would fall over if she had to walk. Was brought in today due to having a fever. PT complaining of SOB as well, and has pain in both knees. Denies cough, headache, chest pain, nausea, vomiting. Has not started any chemotherapy per daughter     Dermatology consulted for ulcers on the buttock area. Per daughters, ulcers have been present for several years now. Have been waxing and waning, never fully cleared. In Feb 2021, ulcers were biopsied showing changes compatible with a viral process. Patient was then treated with a 10 day course of Valtrex. More recently, ulcers have flared again. Patient's outpatient hematologist has again treated the lesions with a 10 day course of Valtrex 1000mg bid prior to starting suppressive dosing of 500mg bid. Family uncertain if it has been helping. Patient is experiencing pain secondary to these ulcers.      PAST MEDICAL & SURGICAL HISTORY:  HTN (hypertension)    HLD (hyperlipidemia)    Asthma    RA (rheumatoid arthritis)    RUSTY (generalized anxiety disorder)    Elective surgery  bunionectomy        REVIEW OF SYSTEMS      General: no fevers/chills, no lethary	    Skin/Breast: see HPI  	  Ophthalmologic: no eye pain or change in vision  	  ENMT: no dysphagia or change in hearing    Respiratory and Thorax: no SOB or cough  	  Cardiovascular: no palpitations or chest pain    Gastrointestinal: no abdomenal pain or blood in stool     Genitourinary: no dysuria or frequency    Musculoskeletal: no joint pains or weakness	    Neurological:no weakness, numbness , or tingling    MEDICATIONS  (STANDING):  amitriptyline 10 milliGRAM(s) Oral at bedtime  ascorbic acid 500 milliGRAM(s) Oral daily  cefTRIAXone   IVPB 1000 milliGRAM(s) IV Intermittent every 24 hours  furosemide   Injectable 40 milliGRAM(s) IV Push daily  heparin   Injectable 5000 Unit(s) SubCutaneous every 8 hours  influenza   Vaccine 0.5 milliLiter(s) IntraMuscular once  lidocaine   4% Patch 1 Patch Transdermal every 24 hours  LORazepam     Tablet 2 milliGRAM(s) Oral two times a day  multivitamin 1 Tablet(s) Oral daily  perphenazine 2 milliGRAM(s) Oral at bedtime  polyethylene glycol 3350 17 Gram(s) Oral two times a day  senna 2 Tablet(s) Oral at bedtime    MEDICATIONS  (PRN):  acetaminophen   Tablet .. 650 milliGRAM(s) Oral every 6 hours PRN Temp greater or equal to 38C (100.4F)  acetaminophen   Tablet .. 650 milliGRAM(s) Oral every 6 hours PRN Mild Pain (1 - 3), Moderate Pain (4 - 6)      Allergies    penicillin (Unknown)    Intolerances    Biaxin (Other)      SOCIAL HISTORY: non contributory    FAMILY HISTORY: non contributory      Vital Signs Last 24 Hrs  T(C): 36.8 (07 Oct 2021 17:33), Max: 37.7 (07 Oct 2021 01:02)  T(F): 98.2 (07 Oct 2021 17:33), Max: 99.8 (07 Oct 2021 01:02)  HR: 83 (07 Oct 2021 17:33) (72 - 93)  BP: 150/79 (07 Oct 2021 17:33) (121/71 - 150/79)  BP(mean): --  RR: 18 (07 Oct 2021 17:33) (18 - 18)  SpO2: 96% (07 Oct 2021 17:33) (95% - 97%)    PHYSICAL EXAM:     The patient was alert and oriented X 3, well nourished, and in no  apparent distress.  OP showed no ulcerations  There was no visible lymphadenopathy.  Conjunctiva were non injected  There was no clubbing or edema of extremities.  The scalp, hair, face, eyebrows, lips, OP, neck, chest, back,   extremities X 4, nails were examined.  There was no hyperhidrosis or bromhidrosis.    Of note on skin exam:   B/l buttocks, lower sacral back, and jayson-anal area with round clean based ulcers, some appearing to be re-epithelializing     LABS:                        10.4   2.29  )-----------( 180      ( 07 Oct 2021 07:17 )             31.0     10-07    133<L>  |  99  |  23  ----------------------------<  115<H>  4.3   |  21<L>  |  1.20    Ca    9.8      07 Oct 2021 07:17  Mg     2.1     10-07    TPro  5.8<L>  /  Alb  2.7<L>  /  TBili  0.3  /  DBili  x   /  AST  118<H>  /  ALT  36  /  AlkPhos  125<H>  10-07          RADIOLOGY & ADDITIONAL STUDIES:
Dr. Lowe  Office [phone]  Cell [phone]  Tanika SAMS  Cell [phone]    RENAL INITIAL CONSULT NOTE: DATE OF SERVICE 10-04-21 @ 19:37    HPI:  Patient is a 80 PMHx of new onset CLL, with possible staging of ovarian cancer, HTN, HLD, CC s/p fall and fevers.  Per daughter, pt lives at home alone, fell two times while getting out of bed. States she fell onto both hands and knees while rolling out of bed. No LOC.  Denied any lightheadedness, dizziness at the time of fall.  Was brought in today due to having a fever. PT complaining of SOB as well, and has pain in both knees. Denies cough, headache, chest pain, nausea, vomiting. Has not started any chemotherapy per daughter. Has renal failure, denied h/o renal failure      Allergies:  Biaxin (Other)  penicillin (Unknown)      PAST MEDICAL & SURGICAL HISTORY:  HTN (hypertension)    HLD (hyperlipidemia)    Asthma    RA (rheumatoid arthritis)    RUSTY (generalized anxiety disorder)    Elective surgery  bunionectomy        Home Medications Reviewed    Hospital Medications:   MEDICATIONS  (STANDING):  cefTRIAXone   IVPB 1000 milliGRAM(s) IV Intermittent every 24 hours  heparin   Injectable 5000 Unit(s) SubCutaneous every 8 hours  influenza   Vaccine 0.5 milliLiter(s) IntraMuscular once      SOCIAL HISTORY:  Denies ETOh, Smoking,     FAMILY HISTORY:      REVIEW OF SYSTEMS:  CONSTITUTIONAL: as per HPI  EYES/ENT: No visual changes;  No vertigo or throat pain   NECK: No pain or stiffness  RESPIRATORY: coughing, SOB off and on  CARDIOVASCULAR: No chest pain or palpitations.  GASTROINTESTINAL: No abdominal or epigastric pain. No nausea, vomiting, or hematemesis; No diarrhea or constipation. No melena or hematochezia.  GENITOURINARY: No dysuria, frequency, foamy urine, urinary urgency, incontinence or hematuria  NEUROLOGICAL: No numbness or weakness  SKIN: No itching, burning, rashes, or lesions   VASCULAR: No bilateral lower extremity edema.   All other review of systems is negative unless indicated above.    VITALS:  T(F): 100.8 (10-04-21 @ 18:37), Max: 101.1 (10-04-21 @ 07:34)  HR: 87 (10-04-21 @ 18:37)  BP: 161/72 (10-04-21 @ 18:37)  RR: 18 (10-04-21 @ 18:37)  SpO2: 99% (10-04-21 @ 18:37)  Wt(kg): --    Height (cm): 143.5 (10-04 @ 07:34)    PHYSICAL EXAM:  Constitutional: NAD  HEENT: anicteric sclera, oropharynx clear, MMM  Neck: No JVD  Respiratory: CTAB, no wheezes, rales or rhonchi  Cardiovascular: S1, S2, RRR  Gastrointestinal: BS+, soft, NT/ND  Extremities: No cyanosis or clubbing. No peripheral edema  Neurological: A/O x 3, no focal deficits  Psychiatric: Normal mood, normal affect  : No CVA tenderness. No majano.   Skin: No rashes     LABS:  10-04    133<L>  |  100  |  28<H>  ----------------------------<  128<H>  4.2   |  20<L>  |  1.43<H>    Ca    10.0      04 Oct 2021 08:21    TPro  6.3  /  Alb  3.3  /  TBili  0.5  /  DBili      /  AST  82<H>  /  ALT  21  /  AlkPhos  113  10-04    Creatinine Trend: 1.43 <--                        10.8   3.52  )-----------( 160      ( 04 Oct 2021 08:21 )             32.9     Urine Studies:  Urinalysis Basic - ( 04 Oct 2021 08:57 )    Color: Yellow / Appearance: Turbid / S.026 / pH:   Gluc:  / Ketone: Negative  / Bili: Negative / Urobili: Negative   Blood:  / Protein: 100 mg/dL / Nitrite: Positive   Leuk Esterase: Negative / RBC: 3 /hpf / WBC 14 /HPF   Sq Epi:  / Non Sq Epi: 20 /hpf / Bacteria: Many          RADIOLOGY & ADDITIONAL STUDIES:                
81y/o F w/ PMH of newly diagnosed CLL, dementia, RA, HTN, HLD who presented to the hospital due to fall, fever, and AMS, found to have UTI. Hematology was consulted for her CLL and an ovarian mass found on PETCT.    Pt is not a reliable historian. She complaint about pain at the stomach, the abdomen, and b/l LEs. Chart review showed that pt was diagnosed w/ CLL on 8/19 w/ flow cytometry and inguinal node biopsy. She has NOT received a BM Bx. Her Imbruvica prescribed by her outpt hematologist Dr. Ayoub has been approved by insurance company but she has not started taking it yet.    Contacted family. Reported that she has had a wt loss of 40lbs in the last year. During the last two months, they noticed a rapid decline in her function. She was complaining about feeling more tired, sleeps more and gets up more often during her sleep. She also complained about SOB and pain everywhere. Denied night sweat. However, during the past few days, family noticed that she was not making much sense compared to her old self.    ROS is limited to pt's mental status    CONSTITUTIONAL: FATIGUE, WT LOSS  RESPIRATORY: SOB  CARDIOVASCULAR: No chest pain or palpitations  GASTROINTESTINAL: No abdominal or epigastric pain. No nausea, vomiting, or hematemesis; No diarrhea or constipation. No melena or hematochezia.  GENITOURINARY: INCREASED FREQUENCY  NEUROLOGICAL: WEAKNESS, AMS    PHYSICAL EXAM  VITALS:   T(C): 36.7 (10-07-21 @ 05:10), Max: 37.7 (10-07-21 @ 01:02)  HR: 81 (10-07-21 @ 05:10) (77 - 93)  BP: 125/73 (10-07-21 @ 05:10) (111/66 - 144/68)  RR: 18 (10-07-21 @ 05:10) (18 - 18)  SpO2: 97% (10-07-21 @ 05:10) (94% - 97%)    GENERAL: A&OX1, NAD  HEAD:  Atraumatic, normocephalic  EYES: EOMI, PERRLA, conjunctiva pale  ENT: Dry mucous membranes  NECK: Supple, no JVD  HEART: Regular rate and rhythm, 1/6 systolic murmur  LUNGS: Unlabored respirations.  Clear to auscultation bilaterally, no crackles, wheezing, or rhonchi  ABDOMEN: Tender to palpation globally  EXTREMITIES: 2+ peripheral pulses bilaterally. No clubbing, cyanosis, 1+ pitting edema, tender to squeeze b/l  NERVOUS SYSTEM:  A&Ox1  SKIN: No rashes or lesions    Imaging studies: PETCT  IMPRESSION:    1.  FDG avid enlarged lymph nodes above and below the diaphragm. The most FDG avid centrally necrotic lymph node is in the left level 2 cervical region (SUV 17.2), and is concerning for lymphoma. Consider repeat biopsy under ultrasound guidance.    2.  Heterogeneous FDG avidity within the nonenlarged spleen with a small punctate FDG avid focus in the superior margin, indeterminate.    3.  Multicystic pelvic mass (15.4*11.1cm) probably adnexal in origin, indeterminate.    4.  Eccentric FDG avidity in the left anal region is indeterminate. FDG avid sigmoid colonic focus is indeterminate, possibly polyp. Suggest further evaluation with colonoscopy.    5.  Two nonspecific FDG avid foci distal body/antrum of the stomach. Suggest clinical correlation and evaluation with upperendoscopy.    6.  FDG avid cutaneous areas right upper gluteal region. Suggest correlation with physical exam.    
Reason for consult: Small Lymphocytic Lymphoma, Ovarian lesion    HPI:  Patient is a 80 PMHx of new onset CLL, with possible staging of ovarian cancer, HTN, HLD, CC s/p fall and fevers.  Per daughter, pt lives at home alone, fell two times while getting out of bed. States she fell onto both hands and knees while rolling out of bed. No LOC.  Denied any lightheadedness, dizziness at the time of fall.  Currently states she has some dizziness, like she would fall over if she had to walk. Was brought in today due to having a fever. PT complaining of SOB as well, and has pain in both knees. Denies cough, headache, chest pain, nausea, vomiting. Has not started any chemotherapy per daughter (04 Oct 2021 15:49)    Hematology/Oncology consulted for patient with recent diagnosis of SLL with extensive lymphadenopathy and an ovarian cystic mass of unclear etiology. Patient was unable to give history so we spoke with her daughter, Francesca, who has informed us that the patient is under care by Dr. Raven Ayoub, [facility] Hematologist for the SLL and by Dr. Martha Leyva for the ovarian lesion (also a [facility] Oncologist).     PAST MEDICAL & SURGICAL HISTORY:  HTN (hypertension)    HLD (hyperlipidemia)    Asthma    RA (rheumatoid arthritis)    RUSTY (generalized anxiety disorder)    Elective surgery  bunionectomy        FAMILY HISTORY:      Alochol: Denied  Smoking: Nonsmoker  Drug Use: Denied  Marital Status:         Allergies    penicillin (Unknown)    Intolerances    Biaxin (Other)      MEDICATIONS  (STANDING):  cefTRIAXone   IVPB 1000 milliGRAM(s) IV Intermittent every 24 hours  heparin   Injectable 5000 Unit(s) SubCutaneous every 8 hours  influenza   Vaccine 0.5 milliLiter(s) IntraMuscular once  lidocaine   4% Patch 1 Patch Transdermal every 24 hours  polyethylene glycol 3350 17 Gram(s) Oral two times a day  senna 2 Tablet(s) Oral at bedtime  sodium chloride 0.9%. 1000 milliLiter(s) (50 mL/Hr) IV Continuous <Continuous>    MEDICATIONS  (PRN):  acetaminophen   Tablet .. 650 milliGRAM(s) Oral every 6 hours PRN Temp greater or equal to 38C (100.4F)  acetaminophen   Tablet .. 650 milliGRAM(s) Oral every 6 hours PRN Mild Pain (1 - 3), Moderate Pain (4 - 6)      ROS  No fever, sweats, chills  Patient with poor memory   No epistaxis, HA, sore throat  No CP, SOB, cough, sputum  No n/v/d, abd pain, melena, hematochezia  No edema  No rash  No anxiety  No back pain, joint pain  No bleeding, bruising  No dysuria, hematuria    T(C): 37.4 (10-05-21 @ 13:24), Max: 39.1 (10-05-21 @ 10:19)  HR: 72 (10-05-21 @ 13:50) (72 - 89)  BP: 133/72 (10-05-21 @ 13:50) (115/51 - 161/72)  RR: 18 (10-05-21 @ 13:50) (18 - 22)  SpO2: 94% (10-05-21 @ 13:50) (93% - 99%)  Wt(kg): --    PE  NAD  Awake, disoriented but was able to answer simple questions  Anicteric, MMM  RRR  CTAB  Abd soft, NT, ND  Palpable left axillary shotty adenopathy  No c/c/e  No rash grossly                            10.4   2.91  )-----------( 199      ( 05 Oct 2021 11:42 )             31.0       10-05    131<L>  |  98  |  32<H>  ----------------------------<  93  4.3   |  20<L>  |  1.52<H>    Ca    9.5      05 Oct 2021 07:40    TPro  5.8<L>  /  Alb  3.2<L>  /  TBili  0.4  /  DBili  x   /  AST  118<H>  /  ALT  29  /  AlkPhos  115  10-05      EXAM:  CT CHEST                            PROCEDURE DATE:  10/04/2021            INTERPRETATION:  CLINICAL INFORMATION: Shortness of breath, evaluate for pneumonia    COMPARISON: CT abdomen and pelvis 10/4/2021    CONTRAST/COMPLICATIONS:  IV Contrast: None  Oral Contrast: None  Complications: No immediate complication    PROCEDURE:  CT of the Chest was performed.  Sagittal and coronal reformats were performed.    FINDINGS:    LUNGS AND AIRWAYS: Patent central airways.  Calcified nodule abutting the left anterior pleura (series 4, image 57). 3 mm nodule in the left upper lobe (series 4, image 39). Additional 2 mm nodule in the left upper lobe (series 4, image 58).  PLEURA: No pleural effusion.  MEDIASTINUM AND ERICA: Multiple enlarged supraclavicular lymph nodes. Additional axillary and mediastinal lymphadenopathy is seen.  VESSELS: Within normal limits.  HEART: Heart size is normal. No pericardial effusion.  CHEST WALL AND LOWER NECK: Calcifications within the bilateral thyroid lobes.  VISUALIZED UPPER ABDOMEN: Please refer to prior CT abdomen/pelvis.  BONES: Degenerative changes of the spine.    IMPRESSION:  Lymphadenoapthy described above is consistent with CLL as per patient history.    No CT evidence for pneumonia.      EXAM:  CT ABDOMEN AND PELVIS IC                            PROCEDURE DATE:  10/04/2021            INTERPRETATION:  CLINICAL INFORMATION: Left lower quadrant pain    COMPARISON: None.    CONTRAST/COMPLICATIONS:  IV Contrast: Omnipaque 350  90 cc administered   10 cc discarded  Oral Contrast: None  Complications: None    PROCEDURE:  CT of the Abdomen and Pelvis was performed.  Sagittal and coronal reformats were performed.    FINDINGS:  LOWER CHEST: Bibasilar atelectasis. Partially imaged enlarged right axillary lymph nodes.    LIVER: Within normal limits.  BILE DUCTS: Normal caliber.  GALLBLADDER: Within normal limits.  SPLEEN: Within normal limits.  PANCREAS: Fatty atrophy of the pancreas without cutoff. No ductal dilatation or cutoff.  ADRENALS: Within normal limits.  KIDNEYS/URETERS: Within normal limits.    BLADDER/REPRODUCTIVE ORGANS: Multiloculated right adnexal cystic mass measuring 14.6 x 9.0 cm, which abuts the appendix. Left adnexal cyst measures 2.1 x 1.4 cm. Uterus is unremarkable.    BOWEL: No bowel obstruction. Appendix is normal. Colonic diverticulosis.  PERITONEUM: 2 small nodules are present adjacent to the cystic lesion measuring 4 and 6 mm in diameter, respectively (2:75 and 79), possibly peritoneal nodules or small pericolonic lymph nodes.  VESSELS: Within normal limits.  RETROPERITONEUM/LYMPH NODES: Enlarged bilateral inguinal, iliac, mesenteric, and retroperitoneal lymph nodes.  *  Reference left inguinal lymph node measures up to 4.0 x 2.5 cm (2:93).  *  Reference left periaortic lymph node measures up to 2.5 x 2.3 cm (2:45).  ABDOMINAL WALL: Within normal limits.  BONES: Degenerative changes. Lumbar dextroscoliosis.    IMPRESSION:  Enlarged bilateral inguinal, iliac, mesenteric, and retroperitoneal lymph nodes. Partially imaged enlarged right axillary lymph nodes. Primary consideration is lymphoma. Metastatic disease is not excluded. Dedicated chest imaging is recommended for full evaluation.    Cystic right adnexal mass measuring 14.6 x 9.0 cm, concerning for epithelial neoplasm.    EXAM:  CT CERVICAL SPINE                          EXAM:  CT BRAIN                            PROCEDURE DATE:  10/04/2021            INTERPRETATION:  CLINICAL STATEMENT: Trauma..    TECHNIQUE: CT of the head and cervical spine were performed without IV contrast. 3-D/MIP images obtained    COMPARISON: None.    FINDINGS:  Head:  There is mild diffuse parenchymal volume loss. There are areas of low attenuation in the periventricular white matter likely related to mild chronic microvascular ischemic changes.    There is no acute intracranial hemorrhage, parenchymal mass, mass effect or midline shift. There is no acute territorial infarct. There is no hydrocephalus. Partial empty sella    The cranium is intact.    Mild mucosal thickening right maxillary sinus    Cervical spine:  Vertebral body heights intact. Straightening of the cervical lordosis. Grade 1 retrolisthesis C3 on C4. Grade 1 anterolisthesis of C5 on C6    There is no prevertebral soft tissue swelling. The odontoid is intact.    Evaluation of the spinal canal is limited on a CT exam.  Multilevel degenerative changes noted resulting in multilevel spinal canal stenosis and neural foraminal narrowing.    Partially visualized cervical lymphadenopathy noted with multiple enlarged lymph nodes. Calcifications noted within the thyroid gland.    IMPRESSION:  No acute intracranial hemorrhage.    No acute fracture cervical spine. If pain persists, follow-up MRI exam recommended    Partially visualized cervical lymphadenopathy suspicious for lymphoma or metastatic disease. Correlate clinically      ACCESSION No:  [id number]  Patient:   PREM KAY      Accession:                             [id number]    Collected Date/Time:                   9/24/2021 17:45 EDT  Received Date/Time:                    9/24/2021 17:45 EDT    Fine Needle Aspiration Report - Auth (Verified)    Specimen(s) Submitted  LYMPH NODE, CERVICAL, LEVEL 2, LEFT, US GUIDED CORE BIOPSY AND FNA    Final Diagnosis  LYMPH NODE, CERVICAL, LEVEL 2, LEFT, US GUIDED CORE BIOPSY AND FNA  POSITIVE FOR MALIGNANT CELLS.  Cytologic evaluation is consistent with   small lymphocytic  lymphoma/chronic  lymphocytic leukemia (see previous history).      See note and description.    Diagnostic note:  I n summary cytomorphology and  immunophenotyping  by flow cytometry  and immunohistochemical stains, findings are consistent with small  lymphocytic lymphoma/chronic lymphocytic leukemia.    Microscopic description:   The cytology smears and cell block section  show a monotonous population of mostly small lymphocytes in a  background of  lymphoglandular  bodies and blood.  The lymphocytes show  clumped stippled chromatin pattern and inconspicuous nucleoli.   No  Lucio- Mckenzie cells, granuloma or epithelial cells present.    No epithelial lesion identified.    The core biopsy  shows effacement of lymph node architecture by a small,  and monotonous population of abnormal lymphocytes.    Immunohistochemical stains:   (Performed on formalin fixed paraffin  embedded tissue, block 1B; CD3, CD5, CD20, CD79a, PAX5, CD43, BCL2, CD10,  Bcl6,  Cyclin-D1, CD21, CD23, MUM1, LEF1 and Ki-67:  Show neoplastic cells, small to medium size are  Positive  for CD20, CD79a, PAX5, CD5, CD23, CD43, LEF-1 and BCL-2.  Ki-67 shows nuclear staining in about 10 to focal 20% of cells.  Negative  for BCL-6, CD10, CD21 and  Cyclin  D1.  Others:  CD3 and CD5 stains the T lymphocytes. .  MUM1 is positive in scattered cells    Flow cytometry:   Monotypic  B-cells (45% of cells), positive for kappa,  CD19, CD20, partial CD23, CD5; negative FMC-7, CD10, CD38.  A population  of phenotypically normal   T-cells with normal CD4 to CD8 ratio is also  present.    Screened by: Dorothea Carlos SCT(ASCP)  Verified by: Naty Austin M.D.  (Electronic Signature)  Reported on: 09/29/21 17:16 EDT, [address] [facility] [place] [address]  Phone: [phone]   Fax: [phone]  Cytology technical processing performed at [address] [place] [address]  _________________________________________________________________    Statement of Adequacy  Immediate cytologic study for adequacy of specimen using a Diff-Quik stain  was performed at  [facility], [address] [place] [address]   .  FNA deemed acceptable for further study by the cytotechnologist.                      
DATE OF SERVICE: 10-06-21      CHIEF COMPLAINT:Patient is a 80y old  Female who presents with a chief complaint of AMS (06 Oct 2021 11:21)      HISTORY OF PRESENT ILLNESS:HPI:  Patient is a 80 PMHx of new onset CLL, with possible staging of ovarian cancer, HTN, HLD, CC s/p fall and fevers.  Per daughter, pt lives at home alone, fell two times while getting out of bed. States she fell onto both hands and knees while rolling out of bed. No LOC.  Denied any lightheadedness, dizziness at the time of fall.  Currently states she has some dizziness, like she would fall over if she had to walk. Was brought in today due to having a fever. PT complaining of SOB as well, and has pain in both knees. Denies cough, headache, chest pain, nausea, vomiting. Has not started any chemotherapy per daughter (04 Oct 2021 15:49)      PAST MEDICAL & SURGICAL HISTORY:  HTN (hypertension)    HLD (hyperlipidemia)    Asthma    RA (rheumatoid arthritis)    RUSTY (generalized anxiety disorder)    Elective surgery  bunionectomy            MEDICATIONS:  furosemide   Injectable 20 milliGRAM(s) IV Push once  heparin   Injectable 5000 Unit(s) SubCutaneous every 8 hours      albuterol/ipratropium for Nebulization 3 milliLiter(s) Nebulizer every 6 hours    acetaminophen   Tablet .. 650 milliGRAM(s) Oral every 6 hours PRN  acetaminophen   Tablet .. 650 milliGRAM(s) Oral every 6 hours PRN  amitriptyline 10 milliGRAM(s) Oral at bedtime  LORazepam     Tablet 2 milliGRAM(s) Oral two times a day  perphenazine 2 milliGRAM(s) Oral at bedtime    polyethylene glycol 3350 17 Gram(s) Oral two times a day  senna 2 Tablet(s) Oral at bedtime      ascorbic acid 500 milliGRAM(s) Oral daily  influenza   Vaccine 0.5 milliLiter(s) IntraMuscular once  lidocaine   4% Patch 1 Patch Transdermal every 24 hours  multivitamin 1 Tablet(s) Oral daily      FAMILY HISTORY:      Non-contributory    SOCIAL HISTORY:    not a smoker    Allergies    penicillin (Unknown)    Intolerances    Biaxin (Other)  	    REVIEW OF SYSTEMS:  CONSTITUTIONAL: No fever  EYES: No eye pain, visual disturbances, or discharge  ENMT:  No difficulty hearing, tinnitus  NECK: No pain or stiffness  RESPIRATORY: No cough, wheezing,  CARDIOVASCULAR: No chest pain, palpitations, passing out, dizziness, or leg swelling  GASTROINTESTINAL:  No nausea, vomiting, diarrhea or constipation. No melena.  GENITOURINARY: No dysuria, hematuria  NEUROLOGICAL: No stroke like symptoms  SKIN: No burning or lesions   ENDOCRINE: No heat or cold intolerance  MUSCULOSKELETAL: No joint pain or swelling  PSYCHIATRIC: No  anxiety, mood swings  HEME/LYMPH: No bleeding gums  ALLERGY AND IMMUNOLOGIC: No hives or eczema	    All other ROS negative    PHYSICAL EXAM:  T(C): 36.7 (10-06-21 @ 22:29), Max: 37.2 (10-06-21 @ 01:15)  HR: 93 (10-06-21 @ 22:29) (77 - 93)  BP: 136/74 (10-06-21 @ 22:29) (111/66 - 157/75)  RR: 18 (10-06-21 @ 22:29) (18 - 18)  SpO2: 96% (10-06-21 @ 22:29) (94% - 97%)  Wt(kg): --  I&O's Summary    05 Oct 2021 07:01  -  06 Oct 2021 07:00  --------------------------------------------------------  IN: 2120 mL / OUT: 1150 mL / NET: 970 mL    06 Oct 2021 07:01  -  06 Oct 2021 23:27  --------------------------------------------------------  IN: 1260 mL / OUT: 900 mL / NET: 360 mL        Appearance: Normal	  HEENT:   Normal oral mucosa, EOMI	  Cardiovascular:  S1 S2, No JVD,    Respiratory: Lungs clear to auscultation	  Psychiatry: Alert  Gastrointestinal:  Soft, Non-tender, + BS	  Skin: No rashes   Neurologic: Non-focal  Extremities:  No edema  Vascular: Peripheral pulses palpable    	    	  	  CARDIAC MARKERS:  Labs personally reviewed by me                                  10.1   2.46  )-----------( 189      ( 06 Oct 2021 07:07 )             30.3     10-06    134<L>  |  102  |  25<H>  ----------------------------<  84  3.9   |  21<L>  |  1.28    Ca    9.2      06 Oct 2021 07:07    TPro  5.6<L>  /  Alb  2.6<L>  /  TBili  0.4  /  DBili  x   /  AST  97<H>  /  ALT  25  /  AlkPhos  104  10-06          EKG: Personally reviewed by me - Sinus arrythmia   Radiology: Personally reviewed by me -   chest xray mild pulm edema         Assessment and Plan:   · Assessment	  Patient is a 80 PMHx of new onset CLL, with possible staging of ovarian cancer, HTN, HLD, CC s/p fall and fevers.  Per daughter, pt lives at home alone, fell two times while getting out of bed. States she fell onto both hands and knees while rolling out of bed. No LOC.  Denied any lightheadedness, dizziness at the time of fall.  Currently states she has some dizziness, like she would fall over if she had to walk. Was brought in today due to having a fever. PT complaining of SOB as well, and has pain in both knees. Denies cough, headache, chest pain, nausea, vomiting. Has not started any chemotherapy per daughter    # AMS and dizziness   check orthostatics  Neurology eval appreciated follow up recs  - unlikely cardiac     # SOB  d/pablito IVF as CXR with mild pulm vasc congestion  - SOB has resolved now with clear lungs  - Lasix if SOB returns     # HARJIT   HARJIT resolved    # HTN   resume BP meds   adjust as tolerated     # HLD   lipid panel            Differential diagnosis and plan of care discussed with patient after the evaluation. Counseling on diet, nutritional counseling, weight management, exercise and medication compliance was done.   Advanced care planning/advanced directives discussed with patient/family. DNR status including forceful chest compressions to attempt to restart the heart, ventilator support/artificial breathing, electric shock, artificial nutrition, health care proxy, Molst form all discussed with pt. Pt wishes to consider. More than fifteen minutes spent on discussing advanced directives.          Dallas Monaco DO [facility]  Cardiovascular Medicine  [address] [place] [address], [address]  Office [phone]  Cell [phone]
GYNECOLOGIC ONCOLOGY CONSULT NOTE    History obtained from patient and daughter, Sharla, at bedside interpreting on behalf of pt (pt is Macedonian-speaking, understands some English)    79yo postmenopausal, P4 F with PMH including newly diagnosed SLL/CLL, HTN, Alzheimer's vs ?Parkinson's, RA, and known 14cm complex ovarian mass, admitted to Medicine on 10/4/2021 for treatment of urosepsis after presenting to the ED febrile, with abdominal pain, urinary frequency, and status post multiple episodes of falls at home since sudden onset of weakness 10/4/21. Gyn-Onc was consulted for recommendation regarding known 14.6cm x 9cm right-sided ovarian mass. Patient was initially seen outpatient by Dr. Martha Leyva for evaluation of ovarian mass and concurrent lymphadenopathy on 2021. Visit resulted in referral for biopsy of an enlarged L groin lymph node (2021) which showed small lymphocytic lymphoma/chronic lymphocytic leukemia. Patient was then referred to Hematology Oncology for further work up. She has since had an additional biopsy of a cervical lymph node (2021) which again showed small lymphocytic lymphoma/chronic lymphocytic leukemia. Patient follows with Hematology Oncologist, Dr. Raven Ayoub, outpatient and was advised to begin chemotherapy treatment with Imbruvica. She has not begun treatment as she was awaiting insurance approval. Further workup for adnexal mass was deferred at this time as patient is not a candidate for surgical resection of and no evidence of metastasis of GYN origin.     Pt's family requests that CLL and "cancer" not be mentioned to patient.   Health Proxy = Daughter Janie [phone]       OBGYN: , postmenopausal, no postmenopausal bleeding   Surgical History: D+C, Elective Bunionectomy       Past Medical History:   SLL/CLL  HTN (hypertension)  HLD (hyperlipidemia)  Asthma  RA (rheumatoid arthritis)  RUSTY (generalized anxiety disorder)    REVIEW OF SYSTEMS:  CONSTITUTIONAL: afebrile, fatigue  RESPIRATORY: SOB exacerbated/triggered by intermittent abdominal pain  CARDIOVASCULAR: No chest pain, palpitations  GASTROINTESTINAL: intermittent abdominal pain   GENITOURINARY: (+) frequency, no dysuria  NEUROLOGICAL: bilateral lower extremity and upper extremity weakness  MUSCULOSKELETAL: L sided flank pain      Allergies:   Biaxin (Other)  penicillin (Unknown)      acetaminophen   Tablet .. 650 milliGRAM(s) Oral every 6 hours PRN  acetaminophen   Tablet .. 650 milliGRAM(s) Oral every 6 hours PRN  amitriptyline 10 milliGRAM(s) Oral at bedtime  ascorbic acid 500 milliGRAM(s) Oral daily  bisacodyl Suppository 10 milliGRAM(s) Rectal once  cefTRIAXone   IVPB 1000 milliGRAM(s) IV Intermittent every 24 hours  furosemide   Injectable 40 milliGRAM(s) IV Push daily  heparin   Injectable 5000 Unit(s) SubCutaneous every 8 hours  influenza   Vaccine 0.5 milliLiter(s) IntraMuscular once  lidocaine   4% Patch 1 Patch Transdermal every 24 hours  LORazepam     Tablet 2 milliGRAM(s) Oral two times a day  multivitamin 1 Tablet(s) Oral daily  perphenazine 2 milliGRAM(s) Oral at bedtime  polyethylene glycol 3350 17 Gram(s) Oral two times a day  senna 2 Tablet(s) Oral at bedtime      FAMILY HISTORY: n/a   SOCIAL HISTORY: pt lives alone, children visit daily to aid in ADLs      MEDICATIONS  (STANDING):  amitriptyline 10 milliGRAM(s) Oral at bedtime  ascorbic acid 500 milliGRAM(s) Oral daily  bisacodyl Suppository 10 milliGRAM(s) Rectal once  cefTRIAXone   IVPB 1000 milliGRAM(s) IV Intermittent every 24 hours  furosemide   Injectable 40 milliGRAM(s) IV Push daily  heparin   Injectable 5000 Unit(s) SubCutaneous every 8 hours  influenza   Vaccine 0.5 milliLiter(s) IntraMuscular once  lidocaine   4% Patch 1 Patch Transdermal every 24 hours  LORazepam     Tablet 2 milliGRAM(s) Oral two times a day  multivitamin 1 Tablet(s) Oral daily  perphenazine 2 milliGRAM(s) Oral at bedtime  polyethylene glycol 3350 17 Gram(s) Oral two times a day  senna 2 Tablet(s) Oral at bedtime    MEDICATIONS  (PRN):  acetaminophen   Tablet .. 650 milliGRAM(s) Oral every 6 hours PRN Temp greater or equal to 38C (100.4F)  acetaminophen   Tablet .. 650 milliGRAM(s) Oral every 6 hours PRN Mild Pain (1 - 3), Moderate Pain (4 - 6)      OBJECTIVE FINDINGS:    Vital Signs Last 24 Hrs  T(C): 36.6 (07 Oct 2021 12:44), Max: 37.7 (07 Oct 2021 01:02)  T(F): 97.9 (07 Oct 2021 12:44), Max: 99.8 (07 Oct 2021 01:02)  HR: 72 (07 Oct 2021 12:44) (72 - 93)  BP: 121/71 (07 Oct 2021 12:44) (121/71 - 144/68)  BP(mean): --  RR: 18 (07 Oct 2021 12:44) (18 - 18)  SpO2: 96% (07 Oct 2021 12:44) (94% - 97%)    PHYSICAL EXAM:    GENERAL: NAD, lethargic, laying in bed  RESPIRATORY: breathing comfortably on room air   ABDOMEN: Soft, obese, mild tenderness to palpation of LLQ/flank, nondistended; no rebound or guarding  Back: +L sided CVA tenderness  EXTREMITIES: bilateral 1+ edema,    NEURO: 3+ strength in bilateral lower extremities, pill rolling tremor in L hand   PELVIC: deferred  RECTAL: deferred    LABS:                        10.4   2.29  )-----------( 180      ( 07 Oct 2021 07:17 )             31.0     10-07    133<L>  |  99  |  23  ----------------------------<  115<H>  4.3   |  21<L>  |  1.20    Ca    9.8      07 Oct 2021 07:17  Mg     2.1     10-07    TPro  5.8<L>  /  Alb  2.7<L>  /  TBili  0.3  /  DBili  x   /  AST  118<H>  /  ALT  36  /  AlkPhos  125<H>  10-07      Urinalysis Basic - ( 05 Oct 2021 17:05 )    Color: Yellow / Appearance: Clear / S.033 / pH: x  Gluc: x / Ketone: Negative  / Bili: Negative / Urobili: Negative   Blood: x / Protein: 100 / Nitrite: Negative   Leuk Esterase: Negative / RBC: 2 /hpf / WBC 3 /HPF   Sq Epi: x / Non Sq Epi: 2 /hpf / Bacteria: Negative        RADIOLOGY & ADDITIONAL STUDIES:  Pathology   - 2021 Cervical LN Bx - cytological evaluation c/w SLL/CLL  - 2021 Pelvic LN Bx - Positive for malignant cells. SLL/CLL    Imaging  2021 - PETCT  IMPRESSION:    1.  FDG avid enlarged lymph nodes above and below the diaphragm. The most FDG avid centrally necrotic lymph node is in the left level 2 cervical region (SUV 17.2), and is concerning for lymphoma. Consider repeat biopsy under ultrasound guidance.    2.  Heterogeneous FDG avidity within the nonenlarged spleen with a small punctate FDG avid focus in the superior margin, indeterminate.    3.  Multicystic pelvic mass (15.4*11.1cm) probably adnexal in origin, indeterminate.    4.  Eccentric FDG avidity in the left anal region is indeterminate. FDG avid sigmoid colonic focus is indeterminate, possibly polyp. Suggest further evaluation with colonoscopy.    5.  Two nonspecific FDG avid foci distal body/antrum of the stomach. Suggest clinical correlation and evaluation with upperendoscopy.    6.  FDG avid cutaneous areas right upper gluteal region. Suggest correlation with physical exam.    
HPI:   Patient is a 80y female with a past history of HTN,HLD, RA, and anxiety who is being admitted to hospital  with fever and weakness associated with 2 falls yesterday.  She was hospitalized at [facility] a few months ago for a cardiac evaluation, the daughter reports that this was negative.  She has lost about 30 pounds over the past few months.She has had an OPD left inguinal  LN biopsy which revealed small cell lymphoma/CLL.  She also reportedly is being evaluated for ovarian cancer.She has been c/o lower abdominal pain.She developed fever yesterday with accelerated weakness and came to ER for evaluation.No headaches or confusion.She denies any  complaints.Fever to 101 prompted CTX in ER.No one ill at home. no recent travel, born in [place], moved to [place] 50 years ago.    REVIEW OF SYSTEMS:  All other review of systems negative (Comprehensive ROS)    PAST MEDICAL & SURGICAL HISTORY:  HTN (hypertension)    HLD (hyperlipidemia)    Asthma    RA (rheumatoid arthritis)    RUSTY (generalized anxiety disorder)    Elective surgery  bunionectomy        Allergies    penicillin (Unknown)? cannot recall    Intolerances        Antimicrobials Day #  :day 1  cefTRIAXone   IVPB 1000 milliGRAM(s) IV Intermittent every 24 hours    Other Medications:  heparin   Injectable 5000 Unit(s) SubCutaneous every 8 hours  influenza   Vaccine 0.5 milliLiter(s) IntraMuscular once      FAMILY HISTORY:N/A      SOCIAL HISTORY:  Smoking:  x   ETOH:   x  Drug Use: x       T(F): 98.4 (10-04-21 @ 16:55), Max: 101.1 (10-04-21 @ 07:34)  HR: 82 (10-04-21 @ 16:55)  BP: 138/79 (10-04-21 @ 16:55)  RR: 18 (10-04-21 @ 16:55)  SpO2: 99% (10-04-21 @ 16:55)  Wt(kg): --    PHYSICAL EXAM:  General: alert, no acute distress  Eyes:  anicteric, no conjunctival injection, no discharge  Oropharynx: no lesions or injection 	  Neck: supple, without adenopathy  Lungs: clear to auscultation  Heart: regular rate and rhythm; no murmur, rubs or gallops  Abdomen: soft, mild distension, mild nonspecific tenderness, without mass or organomegaly  Skin: no lesions  Extremities: no clubbing, cyanosis, or edema  Neurologic: alert, oriented, moves all extremities    LAB RESULTS:                        10.8   3.52  )-----------( 160      ( 04 Oct 2021 08:21 )             32.9     10-    133<L>  |  100  |  28<H>  ----------------------------<  128<H>  4.2   |  20<L>  |  1.43<H>    Ca    10.0      04 Oct 2021 08:21    TPro  6.3  /  Alb  3.3  /  TBili  0.5  /  DBili  x   /  AST  82<H>  /  ALT  21  /  AlkPhos  113  10-    LIVER FUNCTIONS - ( 04 Oct 2021 08:21 )  Alb: 3.3 g/dL / Pro: 6.3 g/dL / ALK PHOS: 113 U/L / ALT: 21 U/L / AST: 82 U/L / GGT: x           Urinalysis Basic - ( 04 Oct 2021 08:57 )    Color: Yellow / Appearance: Turbid / S.026 / pH: x  Gluc: x / Ketone: Negative  / Bili: Negative / Urobili: Negative   Blood: x / Protein: 100 mg/dL / Nitrite: Positive   Leuk Esterase: Negative / RBC: 3 /hpf / WBC 14 /HPF   Sq Epi: x / Non Sq Epi: 20 /hpf / Bacteria: Many        MICROBIOLOGY:  RECENT CULTURES:        RADIOLOGY REVIEWED:  < from: CT Abdomen and Pelvis w/ IV Cont (10.04.21 @ 09:30) >    EXAM:  CT ABDOMEN AND PELVIS IC                            PROCEDURE DATE:  10/04/2021            INTERPRETATION:  CLINICAL INFORMATION: Left lower quadrant pain    COMPARISON: None.    CONTRAST/COMPLICATIONS:  IV Contrast: Omnipaque 350  90 cc administered   10 cc discarded  Oral Contrast: None  Complications: None    PROCEDURE:  CT of the Abdomen and Pelvis was performed.  Sagittal and coronal reformats were performed.    FINDINGS:  LOWER CHEST: Bibasilar atelectasis. Partially imaged enlarged right axillary lymph nodes.    LIVER: Within normal limits.  BILE DUCTS: Normal caliber.  GALLBLADDER: Within normal limits.  SPLEEN: Within normal limits.  PANCREAS: Fatty atrophy of the pancreas without cutoff. No ductal dilatation or cutoff.  ADRENALS: Within normal limits.  KIDNEYS/URETERS: Within normal limits.    BLADDER/REPRODUCTIVE ORGANS: Multiloculated right adnexal cystic mass measuring 14.6 x 9.0 cm, which abuts the appendix. Left adnexal cyst measures 2.1 x 1.4 cm. Uterus is unremarkable.    BOWEL: No bowel obstruction. Appendix is normal. Colonic diverticulosis.  PERITONEUM: 2 small nodules are present adjacent to the cystic lesion measuring 4 and 6 mm in diameter, respectively (2:75 and 79), possibly peritoneal nodules or small pericolonic lymph nodes.  VESSELS: Within normal limits.  RETROPERITONEUM/LYMPH NODES: Enlarged bilateral inguinal, iliac, mesenteric, and retroperitoneal lymph nodes.  *  Reference left inguinal lymph node measures up to 4.0 x 2.5 cm (2:93).  *  Reference left periaortic lymph node measures up to 2.5 x 2.3 cm (2:45).  ABDOMINAL WALL: Within normal limits.  BONES: Degenerative changes. Lumbar dextroscoliosis.    IMPRESSION:  Enlarged bilateral inguinal, iliac, mesenteric, and retroperitoneal lymph nodes. Partially imaged enlarged right axillary lymph nodes. Primary consideration is lymphoma. Metastatic disease is not excluded. Dedicated chest imaging is recommended for full evaluation.    Cystic right adnexal mass measuring 14.6 x 9.0 cm, concerning for epithelial neoplasm.    < end of copied text >  < from: CT Head No Cont (10.04.21 @ 09:07) >  IMPRESSION:  No acute intracranial hemorrhage.    No acute fracture cervical spine. If pain persists, follow-up MRI exam recommended    Partially visualized cervical lymphadenopathy suspicious for lymphoma or metastatic disease. Correlate clinically    --- End of Report ---    < end of copied text >  < from: Xray Knee 3 Views, Bilateral (10.04.21 @ 09:55) >  IMPRESSION:    Right knee: There is no right knee joint effusion. There is tricompartmental osteoarthrosis most advanced on the lateral tibiofemoral compartment.    Left knee: There is no left knee joint effusion. There is mild tricompartmental spurring.    < end of copied text >  < from: CT Cervical Spine No Cont (10.04.21 @ 08:51) >  IMPRESSION:  No acute intracranial hemorrhage.    No acute fracture cervical spine. If pain persists, follow-up MRI exam recommended    Partially visualized cervical lymphadenopathy suspicious for lymphoma or metastatic disease. Correlate clinically    --- End of Report ---    < end of copied text >  < from: NM PET/CT Onc FDG Skull to Thigh, Inital (21 @ 15:53) >  IMPRESSION:    1.  FDG avid enlarged lymph nodes above and below the diaphragm. The most FDG avid centrally necrotic lymph node is in the left level 2 cervical region (SUV 17.2), and is concerning for lymphoma. Consider repeat biopsy under ultrasound guidance.    2.  Heterogeneous FDG avidity within the nonenlarged spleen with a small punctate FDG avid focus in the superior margin, indeterminate.    3.  Multicystic pelvic mass probably adnexal in origin, indeterminate.    4.  Eccentric FDG avidity in the left anal region is indeterminate. FDG avid sigmoid colonic focus is indeterminate, possibly polyp. Suggest further evaluation with colonoscopy.    5.  Two nonspecific FDG avid foci distal body/antrum of the stomach. Suggest clinical correlation and evaluation with upperendoscopy.    6.  FDG avid cutaneous areas right upper gluteal region. Suggest correlation with physical exam.    < from: Xray Chest 1 View- PORTABLE-Urgent (10.04.21 @ 08:35) >  INTERPRETATION:  A single chest x-ray was obtained on 2021.    INDICATION: Sepsis. Rule out pneumonia.    IMPRESSION:    The heart is normal in size. The lungs appear to be clear. No pleural effusion. No pneumothorax. No acute bony pathology could be identified    --- End of Report ---    < end of copied text >  < end of copied text >  
HPI:  Patient is a 80 PMHx of new onset CLL, with possible staging of ovarian cancer, HTN, HLD, CC s/p fall and fevers.  Per daughter, pt lives at home alone, fell two times while getting out of bed. States she fell onto both hands and knees while rolling out of bed. No LOC.  Denied any lightheadedness, dizziness at the time of fall.  Currently states she has some dizziness, like she would fall over if she had to walk. Was brought in today due to having a fever. PT complaining of SOB as well, and has pain in both knees. Denies cough, headache, chest pain, nausea, vomiting. Has not started any chemotherapy per daughter (04 Oct 2021 15:49)    PERTINENT PM/SXH:   HTN (hypertension)    HLD (hyperlipidemia)    Asthma    RA (rheumatoid arthritis)    RUSTY (generalized anxiety disorder)      Elective surgery      FAMILY HISTORY:    ITEMS NOT CHECKED ARE NOT PRESENT    SOCIAL HISTORY:   Significant other/partner[ ]  Children[x ]  Orthodoxy/Spirituality:Adventist   Substance hx:  [ ]   Tobacco hx:  [ ]   Alcohol hx: [ ]   Home Opioid hx:  [ ] I-Stop Reference No:  Living Situation: [x ]Home  [ ]Long term care  [ ]Rehab [ ]Other    ADVANCE DIRECTIVES:    DNR  MOLST  [ ]  Living Will  [ ]   DECISION MAKER(s):  [ x] Health Care Proxy(s)  [ ] Surrogate(s)  [ ] Guardian           XName(s): Phone Number(s):  DIO     BASELINE (I)ADL(s) (prior to admission):  [place]: [ ]Total  [XX ] Moderate [ ]Dependent    Allergies    penicillin (Unknown)    Intolerances    Biaxin (Other)  MEDICATIONS  (STANDING):  amitriptyline 10 milliGRAM(s) Oral at bedtime  ascorbic acid 500 milliGRAM(s) Oral daily  heparin   Injectable 5000 Unit(s) SubCutaneous every 8 hours  influenza   Vaccine 0.5 milliLiter(s) IntraMuscular once  lidocaine   4% Patch 1 Patch Transdermal every 24 hours  LORazepam     Tablet 2 milliGRAM(s) Oral two times a day  meropenem  IVPB 1000 milliGRAM(s) IV Intermittent every 12 hours  multivitamin 1 Tablet(s) Oral daily  perphenazine 2 milliGRAM(s) Oral at bedtime  polyethylene glycol 3350 17 Gram(s) Oral daily  senna 2 Tablet(s) Oral at bedtime  sodium chloride 0.9%. 1000 milliLiter(s) (50 mL/Hr) IV Continuous <Continuous>  sodium chloride 0.9%. 1000 milliLiter(s) (50 mL/Hr) IV Continuous <Continuous>  valACYclovir 500 milliGRAM(s) Oral two times a day  vancomycin  IVPB 1000 milliGRAM(s) IV Intermittent every 24 hours    MEDICATIONS  (PRN):  acetaminophen   Tablet .. 650 milliGRAM(s) Oral every 6 hours PRN Temp greater or equal to 38C (100.4F)  acetaminophen   Tablet .. 650 milliGRAM(s) Oral every 6 hours PRN Mild Pain (1 - 3), Moderate Pain (4 - 6)  albuterol/ipratropium for Nebulization 3 milliLiter(s) Nebulizer every 6 hours PRN Shortness of Breath and/or Wheezing    PRESENT SYMPTOMS: [ ]Unable to obtain due to poor mentation   Source if other than patient:  [ ]Family   [ ]Team     Pain: [X ]yes [ ]no  QOL impact -   Location -       LOWER BACK              Aggravating factors -   Quality  DULL   Radiation -NONE   Timing- WITH MOVEMENT   -Severity (0-10 scale):  6  Minimal acceptable level (0-10 scale): LESS THAN 5    PAIN AD Score:     [email or web address] (press ctrl +  left click to view)    Dyspnea:                           [ ]Mild [ ]Moderate [ ]Severe  Anxiety:                             [X ]Mild [ ]Moderate [ ]Severe  Fatigue:                             [ ]Mild [X ]Moderate [ ]Severe  Nausea:                             [ ]Mild [ ]Moderate [ ]Severe  Loss of appetite:              [ ]Mild [ ]Moderate [ ]Severe  Constipation:                    [ ]Mild [ ]Moderate [ ]Severe    Other Symptoms:  [ ]All other review of systems negative     Palliative Performance Status Version 2: 30X     %    http://npcrc.org/files/news/palliative_performance_scale_ppsv2.pdf  PHYSICAL EXAM:  Vital Signs Last 24 Hrs  T(C): 36.9 (13 Oct 2021 13:47), Max: 36.9 (13 Oct 2021 13:47)  T(F): 98.5 (13 Oct 2021 13:47), Max: 98.5 (13 Oct 2021 13:47)  HR: 77 (13 Oct 2021 13:47) (73 - 86)  BP: 145/69 (13 Oct 2021 13:47) (116/69 - 155/73)  BP(mean): --  RR: 18 (13 Oct 2021 13:47) (18 - 20)  SpO2: 95% (13 Oct 2021 13:47) (91% - 95%) I&O's Summary    12 Oct 2021 07:01  -  13 Oct 2021 07:00  --------------------------------------------------------  IN: 1250 mL / OUT: 850 mL / NET: 400 mL    13 Oct 2021 07:01  -  13 Oct 2021 15:13  --------------------------------------------------------  IN: 0 mL / OUT: 1000 mL / NET: -1000 mL      GENERAL:  [ XAlert  [X]Oriented x3[ ]Lethargic  [ ]Cachexia  [ ]Unarousable  [X]Verbal Tajik SPEAKING   Behavioral:   [ ] Anxiety  [ ] Delirium [ ] Agitation [ ] Other  HEENT:  [ ]Normal   [X]Dry mouth   [ ]ET Tube/Trach  [ ]Oral lesions  PULMONARY:   [X]Clear [ ]Tachypnea  [ ]Audible excessive secretions   [ ]Rhonchi        [ ]Right [ ]Left [ ]Bilateral  [ ]Crackles        [ ]Right [ ]Left [ ]Bilateral  [ ]Wheezing     [ ]Right [ ]Left [ ]Bilatera  [ ]Diminished breath sounds [ ]right [ ]left [ ]bilateral  CARDIOVASCULAR:    [ ]Regular [ ]Irregular [ ]Tachy  [ ]Shan [ ]Murmur [ ]Other  GASTROINTESTINAL:  [ ]Soft  [ XDistended   [ XXXS  [ ]Non tender [ XTender  [ ]PEG [ ]OGT/ NGT  Last BM:   GENITOURINARY:  [X]Normal [ X Incontinent   [ ]Oliguria/Anuria   [ ]Lopez  MUSCULOSKELETAL:   [ ]Normal   [X]Weakness  [ ]Bed/Wheelchair bound [ ]Edema  NEUROLOGIC:   [ XNo focal deficits  [ ]Cognitive impairment  [ ]Dysphagia [ ]Dysarthria [ ]Paresis [ ]Other   SKIN:   [ XNormal    [ ]Rash  [ ]Pressure ulcer(s)       Present on admission [ ]y [ ]n    CRITICAL CARE:  [ ] Shock Present  [ ]Septic [ ]Cardiogenic [ ]Neurologic [ ]Hypovolemic  [ ]  Vasopressors [ ]  Inotropes   [ ]Respiratory failure present [ ]Mechanical ventilation [ ]Non-invasive ventilatory support [ ]High flow  [ ]Acute  [ ]Chronic [ ]Hypoxic  [ ]Hypercarbic [ ]Other  [ ]Other organ failure     LABS:                        9.9    40.81 )-----------( 194      ( 13 Oct 2021 07:22 )             30.6   10-13    131<L>  |  98  |  43<H>  ----------------------------<  97  4.5   |  23  |  1.40<H>    Ca    11.7<H>      13 Oct 2021 07:22    TPro  5.2<L>  /  Alb  2.6<L>  /  TBili  0.4  /  DBili  x   /  AST  79<H>  /  ALT  24  /  AlkPhos  135<H>  10-13        RADIOLOGY & ADDITIONAL STUDIES:    < from: MR Head w/wo IV Cont (10.12.21 @ 22:23) >      INTERPRETATION:  INDICATIONS:  Change in mental status. History of CLL with new ovarian mass.    TECHNIQUE:  Multiplanar imaging was performed using T1 weighted, T2 weighted and FLAIR sequences.  Diffusion weighted and susceptibility sensitive images were also obtained.  Following intravenous gadolinium, multiplanar T1 weighted images were performed. 7.5 cc Gadavist were administered. 0 cc were discarded.    COMPARISON EXAMINATION:  10/4/2021    FINDINGS: The study is degraded by motion artifact. Repeat sequences were performed.  VENTRICLES AND SULCI:  Prominent in size compatible with age-appropriate volume loss  INTRA-AXIAL:  Patchy areas of white matter T2 hyperintensity are seen compatible with mild microvascular-type changes. No mass effect or abnormal enhancement is seen. No diffusion restriction is seen to suggest acute ischemia.  EXTRA-AXIAL:  There is diffuse bilateral hemispheric mild dural thickening and enhancement demonstrating increased FLAIR signal and mild nodularity.  VISUALIZED SINUSES:  Normal.  VISUALIZED MASTOIDS:  Mild nonspecific soft tissue bilaterally  CALVARIUM:  Normal.  CAROTID FLOW VOIDS:  Present.  MISCELLANEOUS:  Left intraocular lens implant      IMPRESSION:  Mild diffuse dural thickening/enhancement along the hemispheric convexities. While this may be secondary to recent lumbar puncture, other inflammatory or neoplastic etiologies are not excluded. Continued follow-up is advised.    Otherwise no mass effect or acute cerebral ischemia.      < end of copied text >  < from: CT Chest No Cont (10.04.21 @ 20:17) >  CONTRAST/COMPLICATIONS:  IV Contrast: None  Oral Contrast: None  Complications: No immediate complication    PROCEDURE:  CT of the Chest was performed.  Sagittal and coronal reformats were performed.    FINDINGS:    LUNGS AND AIRWAYS: Patent central airways.  Calcified nodule abutting the left anterior pleura (series 4, image 57). 3 mm nodule in the left upper lobe (series 4, image 39). Additional 2 mm nodule in the left upper lobe (series 4, image 58).  PLEURA: No pleural effusion.  MEDIASTINUM AND ERICA: Multiple enlarged supraclavicular lymph nodes. Additional axillary and mediastinal lymphadenopathy is seen.  VESSELS: Within normal limits.  HEART: Heart size is normal. No pericardial effusion.  CHEST WALL AND LOWER NECK: Calcifications within the bilateral thyroid lobes.  VISUALIZED UPPER ABDOMEN: Please refer to prior CT abdomen/pelvis.  BONES: Degenerative changes of the spine.    IMPRESSION:  Lymphadenoapthy described above is consistent with CLL as per patient history.    No CT evidence for pneumonia.      < end of copied text >      PROTEIN CALORIE MALNUTRITION PRESENT: [ ]mild [ ]moderate [ ]severe [ ]underweight [ ]morbid obesity  https://www.andeal.org/vault/2440/web/files/ONC/Table_Clinical%20Characteristics%20to%20Document%20Malnutrition-White%20JV%20et%20al%139410.pdf    Height (cm): 143.5 (10-04-21 @ 07:34)  Weight (kg): 77 (10-04-21 @ 21:42)  BMI (kg/m2): 37.4 (10-04-21 @ 21:42)    [ ]PPSV2 < or = to 30% [ ]significant weight loss  [ ]poor nutritional intake  [ ]anasarca      [ ]Artificial Nutrition      REFERRALS:   [ ]Chaplaincy  [ ]Hospice  [ ]Child Life  [ ]Social Work  [ XCase management [ ]Holistic Therapy     Goals of Care Document:

## 2021-10-13 NOTE — CONSULT NOTE ADULT - ASSESSMENT
Patient is a 80 PMHx of new onset CLL, with possible staging of ovarian cancer, HTN, HLD, CC s/p fall and fevers.

## 2021-10-13 NOTE — PROGRESS NOTE ADULT - ASSESSMENT
79yo F with  new onset CLL, with possible staging of ovarian cancer, HTN, HLD, RA, anxiety, s/p fall and fevers.   AMS likely 2/2 infection.  dizziness better + UTI  CTH and CT c spien unremarkable   CT C A P  LDL 44, A1c 5.2  b12 WNL  MRI brain w/ adn w/o shows dural enhancement   10/12 tmax 102.8 started vanco/toby, more lethargic   10/13 MRI as above   - MRI findings c/w possible neoplastic process more likely given hx.  patient has not had LP   - infectious workup on vanco/toby. pervsiously ctx for uti.   - heme/onc for CLL. recs appreciated   - HARJIT, IVF  - check orthostatics   - PT/OT/SS/SLP, OOBC  - check FS, glucose control <180  - GI/DVT ppx  - Counseling on diet, exercise, and medication adherence was done  - Counseling on smoking cessation and alcohol consumption offered when appropriate.  - Pain assessed and judicious use of narcotics when appropriate was discussed.    - Stroke education given when appropriate.  - Importance of fall prevention discussed.   - Differential diagnosis and plan of care discussed with patient and/or family and primary team  - Thank you for allowing me to participate in the care of this patient. Call with questions.   - d/c planning after infectious workup  RA Thompson MD  Vascular Neurology  Office: [phone]

## 2021-10-13 NOTE — PROGRESS NOTE ADULT - SUBJECTIVE AND OBJECTIVE BOX
Neurology Progress Note    S: Patient seen and examined. resting in bed. doing okay. MRI with dural enhancement     Medication:  MEDICATIONS  (STANDING):  amitriptyline 10 milliGRAM(s) Oral at bedtime  ascorbic acid 500 milliGRAM(s) Oral daily  heparin   Injectable 5000 Unit(s) SubCutaneous every 8 hours  influenza   Vaccine 0.5 milliLiter(s) IntraMuscular once  lidocaine   4% Patch 1 Patch Transdermal every 24 hours  LORazepam     Tablet 2 milliGRAM(s) Oral two times a day  meropenem  IVPB 1000 milliGRAM(s) IV Intermittent every 12 hours  multivitamin 1 Tablet(s) Oral daily  perphenazine 2 milliGRAM(s) Oral at bedtime  polyethylene glycol 3350 17 Gram(s) Oral daily  senna 2 Tablet(s) Oral at bedtime  sodium chloride 0.9%. 1000 milliLiter(s) (50 mL/Hr) IV Continuous <Continuous>  sodium chloride 0.9%. 1000 milliLiter(s) (50 mL/Hr) IV Continuous <Continuous>  valACYclovir 500 milliGRAM(s) Oral two times a day  vancomycin  IVPB 1000 milliGRAM(s) IV Intermittent every 24 hours    MEDICATIONS  (PRN):  acetaminophen   Tablet .. 650 milliGRAM(s) Oral every 6 hours PRN Temp greater or equal to 38C (100.4F)  acetaminophen   Tablet .. 650 milliGRAM(s) Oral every 6 hours PRN Mild Pain (1 - 3), Moderate Pain (4 - 6)  albuterol/ipratropium for Nebulization 3 milliLiter(s) Nebulizer every 6 hours PRN Shortness of Breath and/or Wheezing      Vitals:  Vital Signs Last 24 Hrs  Vital Signs Last 24 Hrs  T(C): 36.8 (10-13-21 @ 09:18), Max: 36.8 (10-12-21 @ 18:00)  T(F): 98.2 (10-13-21 @ 09:18), Max: 98.2 (10-12-21 @ 18:00)  HR: 86 (10-13-21 @ 09:18) (73 - 86)  BP: 146/70 (10-13-21 @ 09:18) (116/69 - 155/73)  BP(mean): --  RR: 18 (10-13-21 @ 09:18) (18 - 20)  SpO2: 91% (10-13-21 @ 09:18) (91% - 95%)              General Exam:   General Appearance: Appropriately dressed and in no acute distress       Head: Normocephalic, atraumatic and no dysmorphic features  Ear, Nose, and Throat: Moist mucous membranes  CVS: S1S2+  Resp: No SOB, no wheeze or rhonchi  GI: soft NT/ND  Extremities: No edema or cyanosis  Skin: No bruises or rashes     Neurological Exam:  Mental Status: Awake, alert and oriented x 1.  Able to follow simple and complex verbal commands. Able to name and repeat. fluent speech. No obvious aphasia or dysarthria noted. masked facies. + frontal release sigbns  more leathargif   Cranial Nerves: PERRL, EOMI, VFFC, sensation V1-V3 intact,  L facial asymmetry, equal elevation of palate, scm/trap 5/5, tongue is midline on protrusion. no obvious papilledema on fundoscopic exam. hearing is grossly intact.   Motor:HASTINGS but + tremor L>R moves Uppers>lowers   Sensation: Intact to light touch and pinprick throughout. no right/left confusion. no extinction to tactile on DSS.   Reflexes: 1+ throughout at biceps, brachioradialis, triceps, patellars and ankles bilaterally and equal. No clonus. R toe and L toe were both downgoing.  Coordination: no dysmetria FNF  Gait: deferred   I personally reviewed the below data/images/labs:      CBC Full  -  ( 13 Oct 2021 07:22 )  WBC Count : 40.81 K/uL  RBC Count : 3.07 M/uL  Hemoglobin : 9.9 g/dL  Hematocrit : 30.6 %  Platelet Count - Automated : 194 K/uL  Mean Cell Volume : 99.7 fl  Mean Cell Hemoglobin : 32.2 pg  Mean Cell Hemoglobin Concentration : 32.4 gm/dL  Auto Neutrophil # : 10.49 K/uL  Auto Lymphocyte # : 26.00 K/uL  Auto Monocyte # : 2.90 K/uL  Auto Eosinophil # : 0.37 K/uL  Auto Basophil # : 0.37 K/uL  Auto Neutrophil % : 24.8 %  Auto Lymphocyte % : 63.7 %  Auto Monocyte % : 7.1 %  Auto Eosinophil % : 0.9 %  Auto Basophil % : 0.9 %      10-13    131<L>  |  98  |  43<H>  ----------------------------<  97  4.5   |  23  |  1.40<H>    Ca    11.7<H>      13 Oct 2021 07:22    TPro  5.2<L>  /  Alb  2.6<L>  /  TBili  0.4  /  DBili  x   /  AST  79<H>  /  ALT  24  /  AlkPhos  135<H>  10-13      < from: CT Head No Cont (10.04.21 @ 09:07) >      EXAM:  CT CERVICAL SPINE                          EXAM:  CT BRAIN                                PROCEDURE DATE:  10/04/2021            INTERPRETATION:  CLINICAL STATEMENT: Trauma..    TECHNIQUE: CT of the head and cervical spine were performed withoutIV contrast. 3-D/MIP images obtained    COMPARISON: None.    FINDINGS:  Head:  There is mild diffuse parenchymal volume loss. There are areas of low attenuation in the periventricular white matter likely related to mild chronic microvascular ischemicchanges.    There is no acute intracranial hemorrhage, parenchymal mass, mass effect or midline shift. There is no acute territorial infarct. There is no hydrocephalus. Partial empty sella    The cranium is intact.    Mild mucosal thickening right maxillary sinus    Cervical spine:  Vertebral body heights intact. Straightening of the cervical lordosis. Grade 1 retrolisthesis C3 on C4. Grade 1 anterolisthesis of C5 on C6    There is no prevertebral soft tissue swelling. The odontoid is intact.    Evaluation of the spinal canal is limited on a CT exam.  Multilevel degenerative changes noted resulting in multilevel spinal canal stenosis and neural foraminal narrowing.    Partially visualized cervical lymphadenopathy noted with multiple enlarged lymph nodes. Calcifications noted within the thyroid gland.    IMPRESSION:  No acute intracranial hemorrhage.    No acute fracture cervical spine. If pain persists, follow-up MRI exam recommended    Partially visualized cervical lymphadenopathy suspicious for lymphoma or metastatic disease. Correlate clinically    --- End of Report ---                ROBI RUTH MD; Attending Radiologist  This document has been electronically signed. Oct  4 2021  9:22AM    < end of copied text >      < from: CT Chest No Cont (10.04.21 @ 20:17) >    EXAM:  CT CHEST                            PROCEDURE DATE:  10/04/2021            INTERPRETATION:  CLINICAL INFORMATION: Shortness of breath, evaluate for pneumonia    COMPARISON: CT abdomen and pelvis 10/4/2021    CONTRAST/COMPLICATIONS:  IV Contrast: None  Oral Contrast: None  Complications: No immediate complication    PROCEDURE:  CT of the Chest was performed.  Sagittal and coronal reformats were performed.    FINDINGS:    LUNGS AND AIRWAYS: Patent central airways.  Calcified nodule abutting the left anterior pleura (series 4, image 57). 3 mm nodule in the left upper lobe (series 4, image 39). Additional 2 mm nodule in the left upper lobe (series 4, image 58).  PLEURA: No pleural effusion.  MEDIASTINUM AND ERICA: Multiple enlarged supraclavicular lymph nodes. Additional axillary and mediastinal lymphadenopathy is seen.  VESSELS: Within normal limits.  HEART: Heart size is normal. No pericardial effusion.  CHEST WALL AND LOWER NECK: Calcifications within the bilateral thyroid lobes.  VISUALIZED UPPER ABDOMEN: Please refer to prior CT abdomen/pelvis.  BONES: Degenerative changes of the spine.    IMPRESSION:  Lymphadenoapthy described above is consistent with CLL as per patient history.    No CT evidence for pneumonia.                --- End of Report ---              KADEEM YE MD; Resident Radiology  This document has been electronically signed.  REYMUNDO VARGAS MD; Attending Radiologist  This document has been electronically signed. Oct  5 2021 12:09PM    < end of copied text >  < from: CT Abdomen and Pelvis w/ IV Cont (10.04.21 @ 09:30) >    EXAM:  CT ABDOMEN AND PELVIS IC                            PROCEDURE DATE:  10/04/2021            INTERPRETATION:  CLINICAL INFORMATION: Left lower quadrant pain    COMPARISON: None.    CONTRAST/COMPLICATIONS:  IV Contrast: Omnipaque 350  90 cc administered   10 cc discarded  Oral Contrast: None  Complications: None    PROCEDURE:  CT of the Abdomen and Pelvis was performed.  Sagittal and coronal reformats were performed.    FINDINGS:  LOWER CHEST: Bibasilar atelectasis. Partially imaged enlarged right axillary lymph nodes.    LIVER: Within normal limits.  BILE DUCTS: Normal caliber.  GALLBLADDER: Within normal limits.  SPLEEN: Within normal limits.  PANCREAS: Fatty atrophy of the pancreas without cutoff. No ductal dilatation or cutoff.  ADRENALS: Within normal limits.  KIDNEYS/URETERS: Within normal limits.    BLADDER/REPRODUCTIVE ORGANS: Multiloculated right adnexal cystic mass measuring 14.6 x 9.0 cm, which abuts the appendix. Left adnexal cyst measures 2.1 x 1.4 cm. Uterus is unremarkable.    BOWEL: No bowel obstruction. Appendix is normal. Colonic diverticulosis.  PERITONEUM: 2 small nodules are present adjacent to the cystic lesion measuring 4 and 6 mm in diameter, respectively (2:75 and 79), possibly peritoneal nodules or small pericolonic lymph nodes.  VESSELS: Within normal limits.  RETROPERITONEUM/LYMPH NODES: Enlarged bilateral inguinal, iliac, mesenteric, and retroperitoneal lymph nodes.  *  Reference left inguinal lymph node measures up to 4.0 x 2.5 cm (2:93).  *  Reference left periaortic lymph node measures up to 2.5 x 2.3 cm (2:45).  ABDOMINAL WALL: Within normal limits.  BONES: Degenerative changes. Lumbar dextroscoliosis.    IMPRESSION:  Enlarged bilateral inguinal, iliac, mesenteric, and retroperitoneal lymph nodes. Partially imaged enlarged right axillary lymph nodes. Primary consideration is lymphoma. Metastatic disease is not excluded. Dedicated chest imaging is recommended for full evaluation.    Cystic right adnexal mass measuring 14.6 x 9.0 cm, concerning for epithelial neoplasm.      --- End of Report ---      WALTER ALANIS MD; Resident Interventional Radiology  This document has been electronically signed.  DAREN MOHAN MD; Attending Radiologist  This document has been electronically signed. Oct  4 2021 11:14AM    < end of copied text >        < from: MR Head w/wo IV Cont (10.12.21 @ 22:23) >    EXAM:  MR BRAIN WAW IC                            PROCEDURE DATE:  10/12/2021            INTERPRETATION:  INDICATIONS:  Change in mental status. History of CLL with new ovarian mass.    TECHNIQUE:  Multiplanar imaging was performed using T1 weighted, T2 weighted and FLAIR sequences.  Diffusion weighted and susceptibility sensitive images were also obtained.  Following intravenous gadolinium, multiplanar T1 weighted images were performed. 7.5 cc Gadavist were administered. 0 cc were discarded.    COMPARISON EXAMINATION:  10/4/2021    FINDINGS: The study is degraded by motion artifact. Repeat sequences were performed.  VENTRICLES AND SULCI:  Prominent in size compatible with age-appropriate volume loss  INTRA-AXIAL:  Patchy areas of white matter T2 hyperintensity are seen compatible with mild microvascular-type changes. No mass effect or abnormal enhancement is seen. No diffusion restriction is seen to suggest acute ischemia.  EXTRA-AXIAL:  There is diffuse bilateral hemispheric mild dural thickening and enhancement demonstrating increased FLAIR signal and mild nodularity.  VISUALIZED SINUSES:  Normal.  VISUALIZED MASTOIDS:  Mild nonspecific soft tissue bilaterally  CALVARIUM:  Normal.  CAROTID FLOW VOIDS:  Present.  MISCELLANEOUS:  Left intraocular lens implant      IMPRESSION:  Mild diffuse dural thickening/enhancement along the hemispheric convexities. While this may be secondary to recent lumbar puncture, other inflammatory or neoplastic etiologies are not excluded. Continued follow-up is advised.    Otherwise no mass effect or acute cerebral ischemia.    --- End of Report ---                UGO SZYMANSKI MD; Attending Radiologist  This document has been electronically signed. Oct 13 2021 10:25AM    < end of copied text >

## 2021-10-13 NOTE — CONSULT NOTE ADULT - CONSULT REASON
CLL and ovarian mass
SOB
large adnexal mass
Lesions
renal failure
fever and weakness
Small Lymphocytic Lymphoma, Ovarian lesion
goals of care

## 2021-10-13 NOTE — PROGRESS NOTE ADULT - ASSESSMENT
Patient is a 80 PMHx of new onset CLL, with possible staging of ovarian cancer, HTN, HLD, CC s/p fall and fevers.  Per daughter, pt lives at home alone, fell two times while getting out of bed. States she fell onto both hands and knees while rolling out of bed. No LOC.  Denied any lightheadedness, dizziness at the time of fall.  Was brought in today due to having a fever. PT complaining of SOB as well, and has pain in both knees. Denies cough, headache, chest pain, nausea, vomiting. Has not started any chemotherapy per daughter. Has renal failure, denied h/o renal failure      A/P:  HARJIT  Baseline Scr unclear  HARJIT possibly sec to contrast s/p 10/4   Renal function was improving but worsened and now better today  Has high ca as well, possibly dehydrated  lasix on hold-yesterday was last dose  FEUrea 30-suggested pre-renal state s/p 2L NS  Now has fever, increased WBC-possible sepsis  getting vanco-monitor vanco level  Monitor renal function at present  Monitor I/O  Avoid nephrotoxics, NSAIds RCA    Hypercalcemia:  Etiology?  dehydration Vs sec to malignancy  s/p IVF    Oncology follow up    Hyponatremia  Initial Urine lytes suggestive of dehydration   Repeat Urine lytes suggestive of SIADH  Monitor Na level daily   Free water restriction 1l/day                    Continue  antibiotics in  NS instead of D5     HTN:  not on antihypertensive meds  monitor at present     Proteinuria/Hematuria  in setting of UTI  Repeat UA post tx   Ct A/P with no renal mass

## 2021-10-14 LAB
ALBUMIN SERPL ELPH-MCNC: 2.8 G/DL — LOW (ref 3.3–5)
ALP SERPL-CCNC: 145 U/L — HIGH (ref 40–120)
ALT FLD-CCNC: 23 U/L — SIGNIFICANT CHANGE UP (ref 10–45)
ANION GAP SERPL CALC-SCNC: 14 MMOL/L — SIGNIFICANT CHANGE UP (ref 5–17)
AST SERPL-CCNC: 73 U/L — HIGH (ref 10–40)
BILIRUB SERPL-MCNC: 0.4 MG/DL — SIGNIFICANT CHANGE UP (ref 0.2–1.2)
BUN SERPL-MCNC: 38 MG/DL — HIGH (ref 7–23)
CA-I BLD-SCNC: 1.52 MMOL/L — HIGH (ref 1.15–1.33)
CALCIUM SERPL-MCNC: 10.4 MG/DL — SIGNIFICANT CHANGE UP (ref 8.4–10.5)
CALCIUM SERPL-MCNC: 10.8 MG/DL — HIGH (ref 8.4–10.5)
CHLORIDE SERPL-SCNC: 99 MMOL/L — SIGNIFICANT CHANGE UP (ref 96–108)
CO2 SERPL-SCNC: 21 MMOL/L — LOW (ref 22–31)
CREAT SERPL-MCNC: 1.05 MG/DL — SIGNIFICANT CHANGE UP (ref 0.5–1.3)
GLUCOSE SERPL-MCNC: 91 MG/DL — SIGNIFICANT CHANGE UP (ref 70–99)
HCT VFR BLD CALC: 30.4 % — LOW (ref 34.5–45)
HGB BLD-MCNC: 10.1 G/DL — LOW (ref 11.5–15.5)
MCHC RBC-ENTMCNC: 33.1 PG — SIGNIFICANT CHANGE UP (ref 27–34)
MCHC RBC-ENTMCNC: 33.2 GM/DL — SIGNIFICANT CHANGE UP (ref 32–36)
MCV RBC AUTO: 99.7 FL — SIGNIFICANT CHANGE UP (ref 80–100)
NRBC # BLD: 0 /100 WBCS — SIGNIFICANT CHANGE UP (ref 0–0)
PLATELET # BLD AUTO: 187 K/UL — SIGNIFICANT CHANGE UP (ref 150–400)
POTASSIUM SERPL-MCNC: 4.2 MMOL/L — SIGNIFICANT CHANGE UP (ref 3.5–5.3)
POTASSIUM SERPL-SCNC: 4.2 MMOL/L — SIGNIFICANT CHANGE UP (ref 3.5–5.3)
PROT SERPL-MCNC: 5.1 G/DL — LOW (ref 6–8.3)
PTH-INTACT FLD-MCNC: 12 PG/ML — LOW (ref 15–65)
RBC # BLD: 3.05 M/UL — LOW (ref 3.8–5.2)
RBC # FLD: 21.3 % — HIGH (ref 10.3–14.5)
SODIUM SERPL-SCNC: 134 MMOL/L — LOW (ref 135–145)
VANCOMYCIN TROUGH SERPL-MCNC: 12.9 UG/ML — SIGNIFICANT CHANGE UP (ref 10–20)
WBC # BLD: 29.67 K/UL — HIGH (ref 3.8–10.5)
WBC # FLD AUTO: 29.67 K/UL — HIGH (ref 3.8–10.5)

## 2021-10-14 PROCEDURE — 74230 X-RAY XM SWLNG FUNCJ C+: CPT | Mod: 26

## 2021-10-14 RX ORDER — VANCOMYCIN HCL 1 G
1000 VIAL (EA) INTRAVENOUS EVERY 24 HOURS
Refills: 0 | Status: DISCONTINUED | OUTPATIENT
Start: 2021-10-14 | End: 2021-10-17

## 2021-10-14 RX ADMIN — HEPARIN SODIUM 5000 UNIT(S): 5000 INJECTION INTRAVENOUS; SUBCUTANEOUS at 21:24

## 2021-10-14 RX ADMIN — LIDOCAINE 1 PATCH: 4 CREAM TOPICAL at 17:27

## 2021-10-14 RX ADMIN — MEROPENEM 100 MILLIGRAM(S): 1 INJECTION INTRAVENOUS at 17:27

## 2021-10-14 RX ADMIN — PERPHENAZINE 2 MILLIGRAM(S): 8 TABLET, FILM COATED ORAL at 21:25

## 2021-10-14 RX ADMIN — Medication 10 MILLIGRAM(S): at 21:25

## 2021-10-14 RX ADMIN — LIDOCAINE 1 PATCH: 4 CREAM TOPICAL at 20:48

## 2021-10-14 RX ADMIN — Medication 650 MILLIGRAM(S): at 16:30

## 2021-10-14 RX ADMIN — Medication 500 MILLIGRAM(S): at 11:14

## 2021-10-14 RX ADMIN — LIDOCAINE 1 PATCH: 4 CREAM TOPICAL at 04:00

## 2021-10-14 RX ADMIN — MEROPENEM 100 MILLIGRAM(S): 1 INJECTION INTRAVENOUS at 05:53

## 2021-10-14 RX ADMIN — VALACYCLOVIR 500 MILLIGRAM(S): 500 TABLET, FILM COATED ORAL at 17:28

## 2021-10-14 RX ADMIN — Medication 250 MILLIGRAM(S): at 11:13

## 2021-10-14 RX ADMIN — HEPARIN SODIUM 5000 UNIT(S): 5000 INJECTION INTRAVENOUS; SUBCUTANEOUS at 15:30

## 2021-10-14 RX ADMIN — HEPARIN SODIUM 5000 UNIT(S): 5000 INJECTION INTRAVENOUS; SUBCUTANEOUS at 05:50

## 2021-10-14 RX ADMIN — VALACYCLOVIR 500 MILLIGRAM(S): 500 TABLET, FILM COATED ORAL at 05:52

## 2021-10-14 RX ADMIN — Medication 650 MILLIGRAM(S): at 15:57

## 2021-10-14 RX ADMIN — Medication 1 TABLET(S): at 11:14

## 2021-10-14 NOTE — SWALLOW VFSS/MBS ASSESSMENT ADULT - ORAL PHASE COMMENTS
spillage to the AE folds spillage to the valleculae with cup sips; no spillage with sips via tsp spillage to the AE folds and pyriform sinus

## 2021-10-14 NOTE — SWALLOW VFSS/MBS ASSESSMENT ADULT - NS SWALLOW VFSS REC ASPIR MON
Monitor for s/s aspiration/laryngeal penetration. If noted:  D/C p.o. intake, provide non-oral nutrition/hydration/meds, and contact this service @ [phone]/change of breathing pattern/fever/pneumonia/upper respiratory infection

## 2021-10-14 NOTE — PROGRESS NOTE ADULT - ASSESSMENT
79yo F with  new onset CLL, with possible staging of ovarian cancer, HTN, HLD, RA, anxiety, s/p fall and fevers.   AMS likely 2/2 infection.  dizziness better + UTI  CTH and CT c spien unremarkable   CT C A P  LDL 44, A1c 5.2  b12 WNL  MRI brain w/ adn w/o shows dural enhancement   10/12 tmax 102.8 started vanco/toyb, more lethargic   10/13 MRI as above   - MRI findings c/w possible neoplastic process more likely given hx.  patient has not had LP   - infectious workup on vanco/toby. pervsiously ctx for uti.   - heme/onc for CLL. recs appreciated   - HARJIT, IVF  - check orthostatics   - PT/OT/SS/SLP, OOBC  - check FS, glucose control <180  - GI/DVT ppx  - Counseling on diet, exercise, and medication adherence was done  - Counseling on smoking cessation and alcohol consumption offered when appropriate.  - Pain assessed and judicious use of narcotics when appropriate was discussed.    - Stroke education given when appropriate.  - Importance of fall prevention discussed.   - Differential diagnosis and plan of care discussed with patient and/or family and primary team  - Thank you for allowing me to participate in the care of this patient. Call with questions.   - d/c planning after infectious workup  RA Thompson MD  Vascular Neurology  Office: [phone]

## 2021-10-14 NOTE — PROGRESS NOTE ADULT - ASSESSMENT
Patient is a 80 PMHx of new onset CLL, with possible staging of ovarian cancer, HTN, HLD, CC s/p fall and fevers.  Per daughter, pt lives at home alone, fell two times while getting out of bed. States she fell onto both hands and knees while rolling out of bed. No LOC.  Denied any lightheadedness, dizziness at the time of fall.  Currently states she has some dizziness, like she would fall over if she had to walk. Was brought in today due to having a fever. PT complaining of SOB as well, and has pain in both knees. Denies cough, headache, chest pain, nausea, vomiting. Has not started any chemotherapy per daughter    #Leukocytosis with fever  -Patient is afebrile  -As per ID patient continue with Vancomycin and Meropenam for elevated WBC count, will monitor  -WBC count downtrending today to 29.67 from 40.81 on 10/13  -As per ID, may need lumbar puncture    -culture results no growth to date   -Procalcitonin level noted   -MR brain completed 10/12, results noted       -Speech and Swallow evaluation, as per Speech and Swallow NPO, modified barium swallow ordered   -family refused NGT, would like to follow modified barium swallow study results first     # AMS and dizziness   CT head noted  fall precautions  orthostatics  Neurology eval appreciated follow up recs  -As per neuro brain MRI w/ and w/o IV contrast completed 10/12, results noted, as per Neurology, palliative care was consulted      # Sepsis  R/O sepsis   UTI vs tumor fever vs other etiologies   Pan cx   Completed course of Rocephin, stopped on 10/08 as per ID  monitor clinically   ID eval appreciated   Hydration as tolerated  Pan CT noted      # HARJIT   HARJIT will monitor renal function  -Elevated calcium, as per renal Lasix is on hold due to likely dehydration, repeat BMP to monitor, and limit fluids to 50cc/hr for 1L, ionized calcium improving   Monitor renal function  I and Os   As per nephrology, monitor renal function and avoid nephrotoxic   -As per renal, monitor Na levels, free water restriction to 1L/day    #Hypercalcemia   -PTH level and Calcium levels noted. Will get endo evaluation   -As per heme/onc, IVF started BS 50cc/Hr or 24 hours  -Ionized calcium improving today. Will monitor       # Ovarian Ca, as per GYN/Onc there is no ovarian CA   likely due to her CLL   seen on CT   onc follow up   Patient follows with TRUNG, outpatient follow up     # HTN   No on an antihypertensive medication   Monitor BP      # HLD   lipid panel     #Wheezing, fluid overload likely   Cont Duonebs  D/C IVF  will adjust diuresis, on hold for now   BNP results noted. Will follow  Echo ordered  Card wali appreciated     DVT and GI PPX        Patient is a 80 PMHx of new onset CLL, with possible staging of ovarian cancer, HTN, HLD, CC s/p fall and fevers.  Per daughter, pt lives at home alone, fell two times while getting out of bed. States she fell onto both hands and knees while rolling out of bed. No LOC.  Denied any lightheadedness, dizziness at the time of fall.  Currently states she has some dizziness, like she would fall over if she had to walk. Was brought in today due to having a fever. PT complaining of SOB as well, and has pain in both knees. Denies cough, headache, chest pain, nausea, vomiting. Has not started any chemotherapy per daughter    #Leukocytosis with fever  -Patient is afebrile  -As per ID patient continue with Vancomycin and Meropenam for elevated WBC count, will monitor  -WBC count downtrending today to 29.67 from 40.81 on 10/13  -As per ID, may need lumbar puncture    -culture results no growth to date   -Procalcitonin level noted   -MR brain completed 10/12, results noted       -Speech and Swallow evaluation, as per Speech and Swallow NPO, modified barium swallow ordered   -family refused NGT, would like to follow modified barium swallow study results first     # AMS and dizziness   CT head noted  fall precautions  orthostatics  Neurology eval appreciated follow up recs  -As per neuro brain MRI w/ and w/o IV contrast completed 10/12, results noted, as per Neurology, palliative care was consulted    - dural thickening noted on MRI. discussed with neurology. patient to repeat MRI in 1-2 month and if no improvement or recurrent findings, she may need LP to R/O leptomeningeal disease.   will discuss plan with patient's daughter     # Sepsis  R/O sepsis   UTI vs tumor fever vs other etiologies   Pan cx   Completed course of Rocephin, stopped on 10/08 as per ID  monitor clinically   ID eval appreciated   Hydration as tolerated  Pan CT noted      # HARJIT   HARJIT will monitor renal function  -Elevated calcium, as per renal Lasix is on hold due to likely dehydration, repeat BMP to monitor, and limit fluids to 50cc/hr for 1L, ionized calcium improving   Monitor renal function  I and Os   As per nephrology, monitor renal function and avoid nephrotoxic   -As per renal, monitor Na levels, free water restriction to 1L/day    #Hypercalcemia   -PTH level and Calcium levels noted. Will get endo evaluation   -As per heme/onc, IVF started BS 50cc/Hr or 24 hours  -Ionized calcium improving today. Will monitor       # Ovarian Ca, as per GYN/Onc there is no ovarian CA   likely due to her CLL   seen on CT   onc follow up   Patient follows with TRUNG, outpatient follow up     # HTN   No on an antihypertensive medication   Monitor BP      # HLD   lipid panel     #Wheezing, fluid overload likely   Cont Duonebs  will adjust diuresis, on hold for now   BNP results noted. Will follow  Echo ordered  Card wali appreciated     DVT and GI PPX

## 2021-10-14 NOTE — SWALLOW VFSS/MBS ASSESSMENT ADULT - RECOMMENDED CONSISTENCY
Dysphagia II diet with honey thick fluids - all PO via teaspoon only with 100% assist/supervision. Crush meds or provide via alternate source.

## 2021-10-14 NOTE — PROGRESS NOTE ADULT - SUBJECTIVE AND OBJECTIVE BOX
Date of Service   10-14-21 @ 14:52    Patient is a 80y old  Female who presents with a chief complaint of AMS (14 Oct 2021 09:53)      INTERVAL HISTORY: pt resting     REVIEW OF SYSTEMS:   CONSTITUTIONAL: No weakness  EYES/ENT: No visual changes; No throat pain  Neck: No pain or stiffness  Respiratory: No cough, wheezing, No shortness of breath  CARDIOVASCULAR: no chest pain or palpitations  GASTROINTESTINAL: No abdominal pain, no nausea, vomiting or hematemesis  GENITOURINARY: No dysuria, frequency or hematuria  NEUROLOGICAL: No stroke like symptoms  SKIN: No rashes    	  MEDICATIONS:        PHYSICAL EXAM:  T(C): 37.1 (10-14-21 @ 13:25), Max: 37.1 (10-14-21 @ 13:25)  HR: 61 (10-14-21 @ 13:25) (61 - 77)  BP: 121/69 (10-14-21 @ 13:25) (113/68 - 147/69)  RR: 18 (10-14-21 @ 13:25) (17 - 18)  SpO2: 95% (10-14-21 @ 13:25) (94% - 96%)  Wt(kg): --  I&O's Summary    13 Oct 2021 07:01  -  14 Oct 2021 07:00  --------------------------------------------------------  IN: 1150 mL / OUT: 1800 mL / NET: -650 mL    14 Oct 2021 07:01  -  14 Oct 2021 14:52  --------------------------------------------------------  IN: 75 mL / OUT: 350 mL / NET: -275 mL          Appearance: In no distress	  HEENT:    PERRL, EOMI	  Cardiovascular:  S1 S2, No JVD  Respiratory: Lungs clear to auscultation	  Gastrointestinal:  Soft, Non-tender, + BS	  Vascularature:  No edema of LE  Psychiatric: Appropriate affect   Neuro: no acute focal deficits                               10.1   29.67 )-----------( 187      ( 14 Oct 2021 07:04 )             30.4     10-14    134<L>  |  99  |  38<H>  ----------------------------<  91  4.2   |  21<L>  |  1.05    Ca    10.8<H>      14 Oct 2021 07:04    TPro  5.1<L>  /  Alb  2.8<L>  /  TBili  0.4  /  DBili  x   /  AST  73<H>  /  ALT  23  /  AlkPhos  145<H>  10-14        Labs personally reviewed      ASSESSMENT/PLAN: 	    Patient is a 80 PMHx of new onset CLL, with possible staging of ovarian cancer, HTN, HLD, CC s/p fall and fevers.  Per daughter, pt lives at home alone, fell two times while getting out of bed. States she fell onto both hands and knees while rolling out of bed. No LOC.  Denied any lightheadedness, dizziness at the time of fall.  Currently states she has some dizziness, like she would fall over if she had to walk. Was brought in today due to having a fever. PT complaining of SOB as well, and has pain in both knees. Denies cough, headache, chest pain, nausea, vomiting. Has not started any chemotherapy per daughter    # AMS and dizziness   check orthostatics  Neurology eval recommends MRI of brain  Unlikely cardiac in nature  barrium swallow eval today   palliative eval appreciated   pt febrile     # SOB  d/pablito IVF as CXR with mild pulm vasc congestion  - Recurrent SOB again this am  - Lasix 40mg IV daily on hold   - close monitor Cr given recent HARJIT    # HARJIT   per nephro:  Baseline Scr unclear  HARJIT possibly sec to contrast s/p 10/4   Renal function was improving but worsened today  Has high ca as well, possibly dehydrated  Hold lasix for a day-received today's dose   Monitor renal function at present  Avoid nephrotoxics, NSAID's RCA    # HTN   continue BP meds  adjust as tolerated     # HLD   lipid panel normal except HDL 15        [facility]   Dallas Monaco DO [facility]  Cardiovascular Medicine  [address], [address]  Office: [phone]  Cell: [phone]

## 2021-10-14 NOTE — SWALLOW VFSS/MBS ASSESSMENT ADULT - RECOMMENDED FEEDING/EATING TECHNIQUES
allow for swallow between intakes/alternate food with liquid/maintain upright posture during/after eating for 30 mins

## 2021-10-14 NOTE — SWALLOW VFSS/MBS ASSESSMENT ADULT - SLP GENERAL OBSERVATIONS
Patient received upright and secure in FRANCK chair, awake and alert, +2L/NC, daughter provided Mongolian translation for exam per pt preference. Unable to follow multi step commands.

## 2021-10-14 NOTE — PROGRESS NOTE ADULT - SUBJECTIVE AND OBJECTIVE BOX
Dr. Lowe  Office [phone]  Cell [phone]  Tanika SAMS  Cell [phone]    RENAL PROGRESS NOTE: DATE OF SERVICE 10-14-21 @ 16:28    Patient is a 80y old  Female who presents with a chief complaint of AMS (14 Oct 2021 09:53)      Patient seen and examined at bedside. No chest pain/sob    VITALS:  T(F): 98.7 (10-14-21 @ 13:25), Max: 98.7 (10-14-21 @ 13:25)  HR: 61 (10-14-21 @ 13:25)  BP: 121/69 (10-14-21 @ 13:25)  RR: 18 (10-14-21 @ 13:25)  SpO2: 95% (10-14-21 @ 13:25)  Wt(kg): --    10-13 @ 07:01  -  10-14 @ 07:00  --------------------------------------------------------  IN: 1150 mL / OUT: 1800 mL / NET: -650 mL    10-14 @ 07:01  -  10-14 @ 16:28  --------------------------------------------------------  IN: 75 mL / OUT: 350 mL / NET: -275 mL          PHYSICAL EXAM:  Constitutional: NAD  Neck: No JVD  Respiratory: CTAB, no wheezes, rales or rhonchi  Cardiovascular: S1, S2, RRR  Gastrointestinal: BS+, soft, NT/ND  Extremities: No peripheral edema    Hospital Medications:   MEDICATIONS  (STANDING):  amitriptyline 10 milliGRAM(s) Oral at bedtime  ascorbic acid 500 milliGRAM(s) Oral daily  heparin   Injectable 5000 Unit(s) SubCutaneous every 8 hours  influenza   Vaccine 0.5 milliLiter(s) IntraMuscular once  lidocaine   4% Patch 1 Patch Transdermal every 24 hours  LORazepam     Tablet 2 milliGRAM(s) Oral two times a day  meropenem  IVPB 1000 milliGRAM(s) IV Intermittent every 12 hours  multivitamin 1 Tablet(s) Oral daily  perphenazine 2 milliGRAM(s) Oral at bedtime  polyethylene glycol 3350 17 Gram(s) Oral daily  senna 2 Tablet(s) Oral at bedtime  sodium chloride 0.9%. 1000 milliLiter(s) (50 mL/Hr) IV Continuous <Continuous>  sodium chloride 0.9%. 1000 milliLiter(s) (50 mL/Hr) IV Continuous <Continuous>  sodium chloride 0.9%. 1000 milliLiter(s) (50 mL/Hr) IV Continuous <Continuous>  valACYclovir 500 milliGRAM(s) Oral two times a day  vancomycin  IVPB 1000 milliGRAM(s) IV Intermittent every 24 hours      LABS:  10-14    134<L>  |  99  |  38<H>  ----------------------------<  91  4.2   |  21<L>  |  1.05    Ca    10.8<H>      14 Oct 2021 07:04    TPro  5.1<L>  /  Alb  2.8<L>  /  TBili  0.4  /  DBili      /  AST  73<H>  /  ALT  23  /  AlkPhos  145<H>  10-14    Creatinine Trend: 1.05 <--, 1.40 <--, 1.60 <--, 1.55 <--, 1.55 <--, 1.55 <--, 1.31 <--    Albumin, Serum: 2.8 g/dL (10-14 @ 07:04)    [id number]  intact pth12  parathyroid hormone intact, serum--                            10.1   29.67 )-----------( 187      ( 14 Oct 2021 07:04 )             30.4     Urine Studies:  Urinalysis - [10-05-21 @ 17:05]      Color Yellow / Appearance Clear / SG 1.033 / pH 6.0      Gluc Negative / Ketone Negative  / Bili Negative / Urobili Negative       Blood Moderate / Protein 100 / Leuk Est Negative / Nitrite Negative      RBC 2 / WBC 3 / Hyaline 1 / Gran  / Sq Epi  / Non Sq Epi 2 / Bacteria Negative    Urine Creatinine 79      [10-10-21 @ 17:00]  Urine Urea Nitrogen 357      [10-10-21 @ 21:55]    PTH -- (Ca 10.4)      [10-14-21 @ 11:40]   12  PTH -- (Ca 12.2)      [10-11-21 @ 08:51]   8  Vitamin D (25OH) 65.1      [10-12-21 @ 08:57]  TSH 1.15      [10-05-21 @ 16:30]  Lipid: chol 87, , HDL 15, LDL --      [10-05-21 @ 16:30]      Free Light Chains: kappa 22.16, lambda 2.82, ratio = 7.86      [10-07 @ 17:52]    RADIOLOGY & ADDITIONAL STUDIES:

## 2021-10-14 NOTE — CHART NOTE - NSCHARTNOTEFT_GEN_A_CORE
Patient not in room, having barium swallow evaluation.   Followed up with HCP/Janie by phone for any questions or concerns regarding our lengthy conversation from 10/13 regarding advance directives.   At this time no decision was made regarding directives and remains FULL CODE.  Family will continue to "think about it".   No further role for palliative care at this time.  Primary team can follow up with family if any changes.  HCP document completed and remains in chart.

## 2021-10-14 NOTE — PROGRESS NOTE ADULT - ASSESSMENT
Patient is a 80 PMHx of new onset CLL, with possible staging of ovarian cancer, HTN, HLD, CC s/p fall and fevers.  Per daughter, pt lives at home alone, fell two times while getting out of bed. States she fell onto both hands and knees while rolling out of bed. No LOC.  Denied any lightheadedness, dizziness at the time of fall.  Was brought in today due to having a fever. PT complaining of SOB as well, and has pain in both knees. Denies cough, headache, chest pain, nausea, vomiting. Has not started any chemotherapy per daughter. Has renal failure, denied h/o renal failure      A/P:  HARJIT  Baseline Scr unclear  HARJIT possibly sec to contrast s/p 10/4   Renal function was improving but worsened and now improving again  Has high ca as well, possibly dehydrated  lasix on hold   FEUrea 30-suggested pre-renal state s/p 2L NS  Now has fever, increased WBC-possible sepsis  getting vanco-monitor vanco level  Monitor renal function at present  Monitor I/O  Avoid nephrotoxics, NSAIds RCA    Hypercalcemia:  Etiology?  dehydration Vs sec to malignancy  s/p IVF    Oncology follow up    Hyponatremia  Initial Urine lytes suggestive of dehydration   Repeat Urine lytes suggestive of SIADH  Monitor Na level daily   Free water restriction 1l/day                    change antibiotics in  NS instead of D5     HTN:  not on antihypertensive meds  monitor at present     Proteinuria/Hematuria  in setting of UTI  Repeat UA post tx   Ct A/P with no renal mass

## 2021-10-14 NOTE — PROGRESS NOTE ADULT - NSPROGADDITIONALINFOA_GEN_ALL_CORE
D/ planing to rehab

## 2021-10-14 NOTE — PROGRESS NOTE ADULT - SUBJECTIVE AND OBJECTIVE BOX
Name of Patient : PREM KAY  MRN: [id number]  Date of visit: 10-14-21 @ 11:18      Subjective: Patient seen and examined. No new events except as noted.       MEDICATIONS:  MEDICATIONS  (STANDING):  amitriptyline 10 milliGRAM(s) Oral at bedtime  ascorbic acid 500 milliGRAM(s) Oral daily  heparin   Injectable 5000 Unit(s) SubCutaneous every 8 hours  influenza   Vaccine 0.5 milliLiter(s) IntraMuscular once  lidocaine   4% Patch 1 Patch Transdermal every 24 hours  LORazepam     Tablet 2 milliGRAM(s) Oral two times a day  meropenem  IVPB 1000 milliGRAM(s) IV Intermittent every 12 hours  multivitamin 1 Tablet(s) Oral daily  perphenazine 2 milliGRAM(s) Oral at bedtime  polyethylene glycol 3350 17 Gram(s) Oral daily  senna 2 Tablet(s) Oral at bedtime  sodium chloride 0.9%. 1000 milliLiter(s) (50 mL/Hr) IV Continuous <Continuous>  sodium chloride 0.9%. 1000 milliLiter(s) (50 mL/Hr) IV Continuous <Continuous>  sodium chloride 0.9%. 1000 milliLiter(s) (50 mL/Hr) IV Continuous <Continuous>  valACYclovir 500 milliGRAM(s) Oral two times a day  vancomycin  IVPB 1000 milliGRAM(s) IV Intermittent every 24 hours      PHYSICAL EXAM:  T(C): 36.8 (10-14-21 @ 09:25), Max: 36.9 (10-13-21 @ 13:47)  HR: 67 (10-14-21 @ 09:25) (65 - 77)  BP: 113/68 (10-14-21 @ 09:25) (113/68 - 147/69)  RR: 18 (10-14-21 @ 09:25) (17 - 18)  SpO2: 94% (10-14-21 @ 09:25) (94% - 96%)  Wt(kg): --  I&O's Summary    13 Oct 2021 07:01  -  14 Oct 2021 07:00  --------------------------------------------------------  IN: 1150 mL / OUT: 1800 mL / NET: -650 mL    14 Oct 2021 07:01  -  14 Oct 2021 11:18  --------------------------------------------------------  IN: 0 mL / OUT: 150 mL / NET: -150 mL          Appearance: Awake, calm   HEENT:  PERRLA   Lymphatic: No lymphadenopathy   Cardiovascular: Normal S1 S2, no JVD  Respiratory: normal effort , clear  Gastrointestinal:  Soft, Non-tender  Skin: No rashes,  warm to touch  Psychiatry:  Mood & affect appropriate  Musculuskeletal: No edema      All labs, Imaging and EKGs personally reviewed         10-13-21 @ 07:01  -  10-14-21 @ 07:00  --------------------------------------------------------  IN: 1150 mL / OUT: 1800 mL / NET: -650 mL    10-14-21 @ 07:01  -  10-14-21 @ 11:18  --------------------------------------------------------  IN: 0 mL / OUT: 150 mL / NET: -150 mL                            10.1   29.67 )-----------( 187      ( 14 Oct 2021 07:04 )             30.4               10-14    134<L>  |  99  |  38<H>  ----------------------------<  91  4.2   |  21<L>  |  1.05    Ca    10.8<H>      14 Oct 2021 07:04    TPro  5.1<L>  /  Alb  2.8<L>  /  TBili  0.4  /  DBili  x   /  AST  73<H>  /  ALT  23  /  AlkPhos  145<H>  10-14

## 2021-10-14 NOTE — SWALLOW VFSS/MBS ASSESSMENT ADULT - DIAGNOSTIC IMPRESSIONS
Patient admitted s/p fall and fever. Noted to be pocketing food by RN and swallow evaluation ordered. Patient presents with an oropharyngeal dysphagia characterized by prolonged mastication of solids, intermittent premature spillage into the airway prior to airway closure, and laryngeal penetration with fluids. Penetration of honey thick liquids is trace and shallow. Penetration of nectar thick liquids and thin liquids is deep and appears to descend lower in the laryngeal vestibule post swallow. Pt with intermittent coughing throughout exam which does appear to clear penetrants from the airway. A chin tuck was ineffective in improving airway protection 2/2 pt inability to consistently follow multi-step commands.    Disorders: reduced lingual strength/ROM/Rate of motion, delay in trigger of the swallow reflex, reduced hyo-laryngeal elevation/excursion, reduced laryngeal closure.

## 2021-10-14 NOTE — SWALLOW VFSS/MBS ASSESSMENT ADULT - LARYNGEAL PENETRATION DURING THE SWALLOW - SILENT
over the arytenoids without complete retrieval; appears to descend deeper into laryngeal vestibule post swallow; pt cued to cough and material seems to exit the airway/Trace over the arytenoids with retrieval/Trace shallow; trace material remains on superior portion of epiglottis, but does not appear to descend deeper in laryngeal vestibule; amount of penetration is less with sips via tsp vs sips via cup/Trace

## 2021-10-14 NOTE — PROGRESS NOTE ADULT - SUBJECTIVE AND OBJECTIVE BOX
CC: f/u for  aspiration  Patient reports she is ok    REVIEW OF SYSTEMS:  All other review of systems negative (Comprehensive ROS)    Antimicrobials Day #  :3  meropenem  IVPB 1000 milliGRAM(s) IV Intermittent every 12 hours  valACYclovir 500 milliGRAM(s) Oral two times a day  vancomycin  IVPB 1000 milliGRAM(s) IV Intermittent every 24 hours    Other Medications Reviewed    T(F): 98.7 (10-14-21 @ 13:25), Max: 98.7 (10-14-21 @ 13:25)  HR: 61 (10-14-21 @ 13:25)  BP: 121/69 (10-14-21 @ 13:25)  RR: 18 (10-14-21 @ 13:25)  SpO2: 95% (10-14-21 @ 13:25)  Wt(kg): --    PHYSICAL EXAM:  General:much more  alert, no acute distress  Eyes:  anicteric, no conjunctival injection, no discharge  Oropharynx: no lesions or injection 	  Neck: supple, without adenopathy  Lungs: poor effort  to auscultation  Heart: regular rate and rhythm; no murmur, rubs or gallops  Abdomen: soft, nondistended, nontender, without mass or organomegaly  Skin: no lesions  Extremities: no clubbing, cyanosis, or edema  Neurologic: more  alert,moves all extremities    LAB RESULTS:                        10.1   29.67 )-----------( 187      ( 14 Oct 2021 07:04 )             30.4     10-14    134<L>  |  99  |  38<H>  ----------------------------<  91  4.2   |  21<L>  |  1.05    Ca    10.8<H>      14 Oct 2021 07:04    TPro  5.1<L>  /  Alb  2.8<L>  /  TBili  0.4  /  DBili  x   /  AST  73<H>  /  ALT  23  /  AlkPhos  145<H>  10-14    LIVER FUNCTIONS - ( 14 Oct 2021 07:04 )  Alb: 2.8 g/dL / Pro: 5.1 g/dL / ALK PHOS: 145 U/L / ALT: 23 U/L / AST: 73 U/L / GGT: x             MICROBIOLOGY:  RECENT CULTURES:  10-11 @ 18:36 .Blood Blood-Peripheral     No growth to date.      10-11 @ 18:25 Clean Catch Clean Catch (Midstream)     <10,000 CFU/mL Normal Urogenital Kaylyn          RADIOLOGY REVIEWED:    < from: MR Head w/wo IV Cont (10.12.21 @ 22:23) >  EXAM:  MR BRAIN WAW IC                            PROCEDURE DATE:  10/12/2021            INTERPRETATION:  INDICATIONS:  Change in mental status. History of CLL with new ovarian mass.    TECHNIQUE:  Multiplanar imaging was performed using T1 weighted, T2 weighted and FLAIR sequences.  Diffusion weighted and susceptibility sensitive images were also obtained.  Following intravenous gadolinium, multiplanar T1 weighted images were performed. 7.5 cc Gadavist were administered. 0 cc were discarded.    COMPARISON EXAMINATION:  10/4/2021    FINDINGS: The study is degraded by motion artifact. Repeat sequences were performed.  VENTRICLES AND SULCI:  Prominent in size compatible with age-appropriate volume loss  INTRA-AXIAL:  Patchy areas of white matter T2 hyperintensity are seen compatible with mild microvascular-type changes. No mass effect or abnormal enhancement is seen. No diffusion restriction is seen to suggest acute ischemia.  EXTRA-AXIAL:  There is diffuse bilateral hemispheric mild dural thickening and enhancement demonstrating increased FLAIR signal and mild nodularity.  VISUALIZED SINUSES:  Normal.  VISUALIZED MASTOIDS:  Mild nonspecific soft tissue bilaterally  CALVARIUM:  Normal.  CAROTID FLOW VOIDS:  Present.  MISCELLANEOUS:  Left intraocular lens implant      IMPRESSION:  Mild diffuse dural thickening/enhancement along the hemispheric convexities. While this may be secondary to recent lumbar puncture, other inflammatory or neoplastic etiologies are not excluded. Continued follow-up is advised.    Otherwise no mass effect or acute cerebral ischemia.    --- End of Report ---    < end of copied text >  < from: Xray Chest 1 View- PORTABLE-Urgent (Xray Chest 1 View- PORTABLE-Urgent .) (10.11.21 @ 20:51) >  EXAM:  XR CHEST PORTABLE URGENT 1V                            PROCEDURE DATE:  10/11/2021            INTERPRETATION:  EXAMINATION: XR CHEST URGENT    CLINICAL INDICATION: NGT placement. Evaluate for aspiration.    TECHNIQUE: Single portable view ofthe chest was obtained.    COMPARISON: X-ray chest from 10/6/2021.    FINDINGS:    Heart size is not well assessed on an AP film. There is patchy atelectasis at the left lung base.  Enteric tube tip is coiled inside the stomach.    IMPRESSION:  Patchy atelectasis at the left lung base.  Enteric tube tip is coiled inside the stomach.    --- End of Report ---      < end of copied text >            Assessment:  Patient had recent stay at [facility] for cardiac w/u , recent dx of CLL/small cell lymphoma, no tx. large pelvic adnexal mass concerning for ovarian ca but not biopsied, admitted about a week ago with fevers, treated for concern of a uti with ctx for 5 doses. Fever had resolved but  72 hours ago it came back, very lethargic, to obtundation  found to be pocketing foot in the mouth raising concern for possible aspiration pneumonia. vanco and meropenem commenced for presumed pneumonia. MRI shows dural enhancement and thickening. I am concerned about cns involvement with the CLL or could have immune mediated pachymeningitis .   Plan:  For now continue vanco and meropenem for presumed aspiration pneumonia  goc being addressed, i suspect this will impact how aggressive we are in further neuro w/u  await further neurology plans , dr fierro to discuss. should we do LP, should we do dural bx?

## 2021-10-14 NOTE — PROGRESS NOTE ADULT - SUBJECTIVE AND OBJECTIVE BOX
Neurology Progress Note    S: Patient seen and examined. resting in bed. doing okay. MRI with dural enhancement palliative called     Medication:  MEDICATIONS  (STANDING):  amitriptyline 10 milliGRAM(s) Oral at bedtime  ascorbic acid 500 milliGRAM(s) Oral daily  heparin   Injectable 5000 Unit(s) SubCutaneous every 8 hours  influenza   Vaccine 0.5 milliLiter(s) IntraMuscular once  lidocaine   4% Patch 1 Patch Transdermal every 24 hours  LORazepam     Tablet 2 milliGRAM(s) Oral two times a day  meropenem  IVPB 1000 milliGRAM(s) IV Intermittent every 12 hours  multivitamin 1 Tablet(s) Oral daily  perphenazine 2 milliGRAM(s) Oral at bedtime  polyethylene glycol 3350 17 Gram(s) Oral daily  senna 2 Tablet(s) Oral at bedtime  sodium chloride 0.9%. 1000 milliLiter(s) (50 mL/Hr) IV Continuous <Continuous>  sodium chloride 0.9%. 1000 milliLiter(s) (50 mL/Hr) IV Continuous <Continuous>  sodium chloride 0.9%. 1000 milliLiter(s) (50 mL/Hr) IV Continuous <Continuous>  valACYclovir 500 milliGRAM(s) Oral two times a day  vancomycin  IVPB 1000 milliGRAM(s) IV Intermittent every 24 hours    MEDICATIONS  (PRN):  acetaminophen   Tablet .. 650 milliGRAM(s) Oral every 6 hours PRN Mild Pain (1 - 3), Moderate Pain (4 - 6)  acetaminophen   Tablet .. 650 milliGRAM(s) Oral every 6 hours PRN Temp greater or equal to 38C (100.4F)  albuterol/ipratropium for Nebulization 3 milliLiter(s) Nebulizer every 6 hours PRN Shortness of Breath and/or Wheezing      Vitals:  Vital Signs Last 24 Hrs  T(C): 36.8 (10-14-21 @ 09:25), Max: 36.9 (10-13-21 @ 13:47)  T(F): 98.2 (10-14-21 @ 09:25), Max: 98.5 (10-13-21 @ 13:47)  HR: 67 (10-14-21 @ 09:25) (65 - 77)  BP: 113/68 (10-14-21 @ 09:25) (113/68 - 147/69)  BP(mean): --  RR: 18 (10-14-21 @ 09:25) (17 - 18)  SpO2: 94% (10-14-21 @ 09:25) (94% - 96%)            General Exam:   General Appearance: Appropriately dressed and in no acute distress       Head: Normocephalic, atraumatic and no dysmorphic features  Ear, Nose, and Throat: Moist mucous membranes  CVS: S1S2+  Resp: No SOB, no wheeze or rhonchi  GI: soft NT/ND  Extremities: No edema or cyanosis  Skin: No bruises or rashes     Neurological Exam:  Mental Status: Awake, alert and oriented x 1.  Able to follow simple and complex verbal commands. Able to name and repeat. fluent speech. No obvious aphasia or dysarthria noted. masked facies. + frontal release sigbns  more leathargif   Cranial Nerves: PERRL, EOMI, VFFC, sensation V1-V3 intact,  L facial asymmetry, equal elevation of palate, scm/trap 5/5, tongue is midline on protrusion. no obvious papilledema on fundoscopic exam. hearing is grossly intact.   Motor:HASTINGS but + tremor L>R moves Uppers>lowers   Sensation: Intact to light touch and pinprick throughout. no right/left confusion. no extinction to tactile on DSS.   Reflexes: 1+ throughout at biceps, brachioradialis, triceps, patellars and ankles bilaterally and equal. No clonus. R toe and L toe were both downgoing.  Coordination: no dysmetria FNF  Gait: deferred   I personally reviewed the below data/images/labs:      CBC Full  -  ( 14 Oct 2021 07:04 )  WBC Count : 29.67 K/uL  RBC Count : 3.05 M/uL  Hemoglobin : 10.1 g/dL  Hematocrit : 30.4 %  Platelet Count - Automated : 187 K/uL  Mean Cell Volume : 99.7 fl  Mean Cell Hemoglobin : 33.1 pg  Mean Cell Hemoglobin Concentration : 33.2 gm/dL  Auto Neutrophil # : x  Auto Lymphocyte # : x  Auto Monocyte # : x  Auto Eosinophil # : x  Auto Basophil # : x  Auto Neutrophil % : x  Auto Lymphocyte % : x  Auto Monocyte % : x  Auto Eosinophil % : x  Auto Basophil % : x      10-14    134<L>  |  99  |  38<H>  ----------------------------<  91  4.2   |  21<L>  |  1.05    Ca    10.8<H>      14 Oct 2021 07:04    TPro  5.1<L>  /  Alb  2.8<L>  /  TBili  0.4  /  DBili  x   /  AST  73<H>  /  ALT  23  /  AlkPhos  145<H>  10-14        < from: CT Head No Cont (10.04.21 @ 09:07) >      EXAM:  CT CERVICAL SPINE                          EXAM:  CT BRAIN                                PROCEDURE DATE:  10/04/2021            INTERPRETATION:  CLINICAL STATEMENT: Trauma..    TECHNIQUE: CT of the head and cervical spine were performed withoutIV contrast. 3-D/MIP images obtained    COMPARISON: None.    FINDINGS:  Head:  There is mild diffuse parenchymal volume loss. There are areas of low attenuation in the periventricular white matter likely related to mild chronic microvascular ischemicchanges.    There is no acute intracranial hemorrhage, parenchymal mass, mass effect or midline shift. There is no acute territorial infarct. There is no hydrocephalus. Partial empty sella    The cranium is intact.    Mild mucosal thickening right maxillary sinus    Cervical spine:  Vertebral body heights intact. Straightening of the cervical lordosis. Grade 1 retrolisthesis C3 on C4. Grade 1 anterolisthesis of C5 on C6    There is no prevertebral soft tissue swelling. The odontoid is intact.    Evaluation of the spinal canal is limited on a CT exam.  Multilevel degenerative changes noted resulting in multilevel spinal canal stenosis and neural foraminal narrowing.    Partially visualized cervical lymphadenopathy noted with multiple enlarged lymph nodes. Calcifications noted within the thyroid gland.    IMPRESSION:  No acute intracranial hemorrhage.    No acute fracture cervical spine. If pain persists, follow-up MRI exam recommended    Partially visualized cervical lymphadenopathy suspicious for lymphoma or metastatic disease. Correlate clinically    --- End of Report ---                ROBI RUTH MD; Attending Radiologist  This document has been electronically signed. Oct  4 2021  9:22AM    < end of copied text >      < from: CT Chest No Cont (10.04.21 @ 20:17) >    EXAM:  CT CHEST                            PROCEDURE DATE:  10/04/2021            INTERPRETATION:  CLINICAL INFORMATION: Shortness of breath, evaluate for pneumonia    COMPARISON: CT abdomen and pelvis 10/4/2021    CONTRAST/COMPLICATIONS:  IV Contrast: None  Oral Contrast: None  Complications: No immediate complication    PROCEDURE:  CT of the Chest was performed.  Sagittal and coronal reformats were performed.    FINDINGS:    LUNGS AND AIRWAYS: Patent central airways.  Calcified nodule abutting the left anterior pleura (series 4, image 57). 3 mm nodule in the left upper lobe (series 4, image 39). Additional 2 mm nodule in the left upper lobe (series 4, image 58).  PLEURA: No pleural effusion.  MEDIASTINUM AND ERICA: Multiple enlarged supraclavicular lymph nodes. Additional axillary and mediastinal lymphadenopathy is seen.  VESSELS: Within normal limits.  HEART: Heart size is normal. No pericardial effusion.  CHEST WALL AND LOWER NECK: Calcifications within the bilateral thyroid lobes.  VISUALIZED UPPER ABDOMEN: Please refer to prior CT abdomen/pelvis.  BONES: Degenerative changes of the spine.    IMPRESSION:  Lymphadenoapthy described above is consistent with CLL as per patient history.    No CT evidence for pneumonia.                --- End of Report ---              KADEEM YE MD; Resident Radiology  This document has been electronically signed.  REYMUNDO VARGAS MD; Attending Radiologist  This document has been electronically signed. Oct  5 2021 12:09PM    < end of copied text >  < from: CT Abdomen and Pelvis w/ IV Cont (10.04.21 @ 09:30) >    EXAM:  CT ABDOMEN AND PELVIS IC                            PROCEDURE DATE:  10/04/2021            INTERPRETATION:  CLINICAL INFORMATION: Left lower quadrant pain    COMPARISON: None.    CONTRAST/COMPLICATIONS:  IV Contrast: Omnipaque 350  90 cc administered   10 cc discarded  Oral Contrast: None  Complications: None    PROCEDURE:  CT of the Abdomen and Pelvis was performed.  Sagittal and coronal reformats were performed.    FINDINGS:  LOWER CHEST: Bibasilar atelectasis. Partially imaged enlarged right axillary lymph nodes.    LIVER: Within normal limits.  BILE DUCTS: Normal caliber.  GALLBLADDER: Within normal limits.  SPLEEN: Within normal limits.  PANCREAS: Fatty atrophy of the pancreas without cutoff. No ductal dilatation or cutoff.  ADRENALS: Within normal limits.  KIDNEYS/URETERS: Within normal limits.    BLADDER/REPRODUCTIVE ORGANS: Multiloculated right adnexal cystic mass measuring 14.6 x 9.0 cm, which abuts the appendix. Left adnexal cyst measures 2.1 x 1.4 cm. Uterus is unremarkable.    BOWEL: No bowel obstruction. Appendix is normal. Colonic diverticulosis.  PERITONEUM: 2 small nodules are present adjacent to the cystic lesion measuring 4 and 6 mm in diameter, respectively (2:75 and 79), possibly peritoneal nodules or small pericolonic lymph nodes.  VESSELS: Within normal limits.  RETROPERITONEUM/LYMPH NODES: Enlarged bilateral inguinal, iliac, mesenteric, and retroperitoneal lymph nodes.  *  Reference left inguinal lymph node measures up to 4.0 x 2.5 cm (2:93).  *  Reference left periaortic lymph node measures up to 2.5 x 2.3 cm (2:45).  ABDOMINAL WALL: Within normal limits.  BONES: Degenerative changes. Lumbar dextroscoliosis.    IMPRESSION:  Enlarged bilateral inguinal, iliac, mesenteric, and retroperitoneal lymph nodes. Partially imaged enlarged right axillary lymph nodes. Primary consideration is lymphoma. Metastatic disease is not excluded. Dedicated chest imaging is recommended for full evaluation.    Cystic right adnexal mass measuring 14.6 x 9.0 cm, concerning for epithelial neoplasm.      --- End of Report ---      WALTER ALANIS MD; Resident Interventional Radiology  This document has been electronically signed.  DAREN MOHAN MD; Attending Radiologist  This document has been electronically signed. Oct  4 2021 11:14AM    < end of copied text >        < from: MR Head w/wo IV Cont (10.12.21 @ 22:23) >    EXAM:  MR BRAIN WAW IC                            PROCEDURE DATE:  10/12/2021            INTERPRETATION:  INDICATIONS:  Change in mental status. History of CLL with new ovarian mass.    TECHNIQUE:  Multiplanar imaging was performed using T1 weighted, T2 weighted and FLAIR sequences.  Diffusion weighted and susceptibility sensitive images were also obtained.  Following intravenous gadolinium, multiplanar T1 weighted images were performed. 7.5 cc Gadavist were administered. 0 cc were discarded.    COMPARISON EXAMINATION:  10/4/2021    FINDINGS: The study is degraded by motion artifact. Repeat sequences were performed.  VENTRICLES AND SULCI:  Prominent in size compatible with age-appropriate volume loss  INTRA-AXIAL:  Patchy areas of white matter T2 hyperintensity are seen compatible with mild microvascular-type changes. No mass effect or abnormal enhancement is seen. No diffusion restriction is seen to suggest acute ischemia.  EXTRA-AXIAL:  There is diffuse bilateral hemispheric mild dural thickening and enhancement demonstrating increased FLAIR signal and mild nodularity.  VISUALIZED SINUSES:  Normal.  VISUALIZED MASTOIDS:  Mild nonspecific soft tissue bilaterally  CALVARIUM:  Normal.  CAROTID FLOW VOIDS:  Present.  MISCELLANEOUS:  Left intraocular lens implant      IMPRESSION:  Mild diffuse dural thickening/enhancement along the hemispheric convexities. While this may be secondary to recent lumbar puncture, other inflammatory or neoplastic etiologies are not excluded. Continued follow-up is advised.    Otherwise no mass effect or acute cerebral ischemia.    --- End of Report ---                UGO SZYMANSKI MD; Attending Radiologist  This document has been electronically signed. Oct 13 2021 10:25AM    < end of copied text >

## 2021-10-14 NOTE — SWALLOW VFSS/MBS ASSESSMENT ADULT - ADDITIONAL RECOMMENDATIONS
Pt is bothered and symptomatic. Recommended CE to improve visual function. Discussed procedure, risks, benefits, and lens options. Maintain good oral hygiene.  Trial of restorative swallow therapy post d/c in rehab setting as pt able.    GOALS:  1. Pt/family/caregiver will demonstrate understanding and carryover of dysphagia management (safe swallow guidelines, compensatory strategies, dysphagia diet).  2. Pt will complete dysphagia exercises to improve swallow function.  3. Pt will tolerate recommended diet with no overt, clinical s/s of aspiration.

## 2021-10-15 ENCOUNTER — OUTPATIENT (OUTPATIENT)
Dept: OUTPATIENT SERVICES | Facility: HOSPITAL | Age: 81
LOS: 1 days | Discharge: HOME | End: 2021-10-15

## 2021-10-15 DIAGNOSIS — Z41.9 ENCOUNTER FOR PROCEDURE FOR PURPOSES OTHER THAN REMEDYING HEALTH STATE, UNSPECIFIED: Chronic | ICD-10-CM

## 2021-10-15 DIAGNOSIS — D64.9 ANEMIA, UNSPECIFIED: ICD-10-CM

## 2021-10-15 LAB
ALBUMIN SERPL ELPH-MCNC: 2.8 G/DL — LOW (ref 3.3–5)
ALP SERPL-CCNC: 147 U/L — HIGH (ref 40–120)
ALT FLD-CCNC: 25 U/L — SIGNIFICANT CHANGE UP (ref 10–45)
ANION GAP SERPL CALC-SCNC: 11 MMOL/L — SIGNIFICANT CHANGE UP (ref 5–17)
AST SERPL-CCNC: 71 U/L — HIGH (ref 10–40)
BILIRUB SERPL-MCNC: 0.4 MG/DL — SIGNIFICANT CHANGE UP (ref 0.2–1.2)
BUN SERPL-MCNC: 32 MG/DL — HIGH (ref 7–23)
CA-I BLD-SCNC: 1.48 MMOL/L — HIGH (ref 1.15–1.33)
CALCIUM SERPL-MCNC: 10.2 MG/DL — SIGNIFICANT CHANGE UP (ref 8.4–10.5)
CHLORIDE SERPL-SCNC: 101 MMOL/L — SIGNIFICANT CHANGE UP (ref 96–108)
CO2 SERPL-SCNC: 22 MMOL/L — SIGNIFICANT CHANGE UP (ref 22–31)
CREAT SERPL-MCNC: 0.98 MG/DL — SIGNIFICANT CHANGE UP (ref 0.5–1.3)
GLUCOSE SERPL-MCNC: 93 MG/DL — SIGNIFICANT CHANGE UP (ref 70–99)
HCT VFR BLD CALC: 30.7 % — LOW (ref 34.5–45)
HGB BLD-MCNC: 10.3 G/DL — LOW (ref 11.5–15.5)
MCHC RBC-ENTMCNC: 33.3 PG — SIGNIFICANT CHANGE UP (ref 27–34)
MCHC RBC-ENTMCNC: 33.6 GM/DL — SIGNIFICANT CHANGE UP (ref 32–36)
MCV RBC AUTO: 99.4 FL — SIGNIFICANT CHANGE UP (ref 80–100)
NRBC # BLD: 0 /100 WBCS — SIGNIFICANT CHANGE UP (ref 0–0)
PLATELET # BLD AUTO: 178 K/UL — SIGNIFICANT CHANGE UP (ref 150–400)
POTASSIUM SERPL-MCNC: 4.5 MMOL/L — SIGNIFICANT CHANGE UP (ref 3.5–5.3)
POTASSIUM SERPL-SCNC: 4.5 MMOL/L — SIGNIFICANT CHANGE UP (ref 3.5–5.3)
PROT SERPL-MCNC: 5.1 G/DL — LOW (ref 6–8.3)
RBC # BLD: 3.09 M/UL — LOW (ref 3.8–5.2)
RBC # FLD: 22.3 % — HIGH (ref 10.3–14.5)
SODIUM SERPL-SCNC: 134 MMOL/L — LOW (ref 135–145)
WBC # BLD: 22.33 K/UL — HIGH (ref 3.8–10.5)
WBC # FLD AUTO: 22.33 K/UL — HIGH (ref 3.8–10.5)

## 2021-10-15 RX ORDER — SENNA PLUS 8.6 MG/1
2 TABLET ORAL AT BEDTIME
Refills: 0 | Status: DISCONTINUED | OUTPATIENT
Start: 2021-10-15 | End: 2021-10-19

## 2021-10-15 RX ORDER — ACETAMINOPHEN 500 MG
650 TABLET ORAL EVERY 6 HOURS
Refills: 0 | Status: DISCONTINUED | OUTPATIENT
Start: 2021-10-15 | End: 2021-10-19

## 2021-10-15 RX ORDER — POLYETHYLENE GLYCOL 3350 17 G/17G
17 POWDER, FOR SOLUTION ORAL DAILY
Refills: 0 | Status: DISCONTINUED | OUTPATIENT
Start: 2021-10-15 | End: 2021-10-19

## 2021-10-15 RX ORDER — ASCORBIC ACID 60 MG
500 TABLET,CHEWABLE ORAL DAILY
Refills: 0 | Status: DISCONTINUED | OUTPATIENT
Start: 2021-10-15 | End: 2021-10-19

## 2021-10-15 RX ORDER — VALACYCLOVIR 500 MG/1
500 TABLET, FILM COATED ORAL
Refills: 0 | Status: DISCONTINUED | OUTPATIENT
Start: 2021-10-15 | End: 2021-10-19

## 2021-10-15 RX ADMIN — Medication 10 MILLIGRAM(S): at 21:43

## 2021-10-15 RX ADMIN — HEPARIN SODIUM 5000 UNIT(S): 5000 INJECTION INTRAVENOUS; SUBCUTANEOUS at 13:34

## 2021-10-15 RX ADMIN — MEROPENEM 100 MILLIGRAM(S): 1 INJECTION INTRAVENOUS at 05:16

## 2021-10-15 RX ADMIN — Medication 250 MILLIGRAM(S): at 11:41

## 2021-10-15 RX ADMIN — VALACYCLOVIR 500 MILLIGRAM(S): 500 TABLET, FILM COATED ORAL at 17:24

## 2021-10-15 RX ADMIN — VALACYCLOVIR 500 MILLIGRAM(S): 500 TABLET, FILM COATED ORAL at 05:22

## 2021-10-15 RX ADMIN — Medication 650 MILLIGRAM(S): at 05:42

## 2021-10-15 RX ADMIN — SENNA PLUS 2 TABLET(S): 8.6 TABLET ORAL at 21:43

## 2021-10-15 RX ADMIN — Medication 650 MILLIGRAM(S): at 17:24

## 2021-10-15 RX ADMIN — PERPHENAZINE 2 MILLIGRAM(S): 8 TABLET, FILM COATED ORAL at 21:43

## 2021-10-15 RX ADMIN — HEPARIN SODIUM 5000 UNIT(S): 5000 INJECTION INTRAVENOUS; SUBCUTANEOUS at 21:43

## 2021-10-15 RX ADMIN — Medication 650 MILLIGRAM(S): at 11:41

## 2021-10-15 RX ADMIN — Medication 650 MILLIGRAM(S): at 06:15

## 2021-10-15 RX ADMIN — POLYETHYLENE GLYCOL 3350 17 GRAM(S): 17 POWDER, FOR SOLUTION ORAL at 11:41

## 2021-10-15 RX ADMIN — Medication 1 TABLET(S): at 11:41

## 2021-10-15 RX ADMIN — LIDOCAINE 1 PATCH: 4 CREAM TOPICAL at 17:24

## 2021-10-15 RX ADMIN — HEPARIN SODIUM 5000 UNIT(S): 5000 INJECTION INTRAVENOUS; SUBCUTANEOUS at 05:17

## 2021-10-15 RX ADMIN — LIDOCAINE 1 PATCH: 4 CREAM TOPICAL at 19:00

## 2021-10-15 RX ADMIN — Medication 650 MILLIGRAM(S): at 12:11

## 2021-10-15 RX ADMIN — Medication 650 MILLIGRAM(S): at 17:54

## 2021-10-15 RX ADMIN — MEROPENEM 100 MILLIGRAM(S): 1 INJECTION INTRAVENOUS at 17:23

## 2021-10-15 RX ADMIN — LIDOCAINE 1 PATCH: 4 CREAM TOPICAL at 05:40

## 2021-10-15 RX ADMIN — Medication 500 MILLIGRAM(S): at 11:41

## 2021-10-15 NOTE — PROGRESS NOTE ADULT - ASSESSMENT
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   She was hospitalized at [facility] a few months ago for a cardiac evaluation, which was negative, per daughter    Admitted to [facility] with fever and weakness associated with 2 falls on 10/03/21.  She developed fever on 10/03 with accelerated weakness and brought to ER for evaluation.   Fever to 101.4 in the ER prompted CTX.  Etiology of fever not clear although "B" symptoms of lymphoma possible but her decline in functional status seemed acute.   Reported abdominal pain and bloating but CT scan without acute pathology.   UA with 14 WBCs, urine cx with Klebsiella - doubt UTI   Blood cx NGTD   Started on empiric Ceftriaxone  CT chest without PNA  WNV serology neg  Quant TB test neg  Repeat RVP still neg   Repeated urine cx neg & blood cx NGTD  Last fever was on 10/05/21   Fevers resolved, but remains weak & deconditioned, interaction remains limited  Empiric Ceftriaxone was stopped on 10/08/21    She was seen by derm on 10/07 for new onset of clean based bilateral buttock & lower sacral area ulcers - started on Valtrex & ulcers were swabbed for HSV & CMV by derm  Wound cx of these ulcers recovered E faecalis - ulcers do not appear to be infected & E faecalis represents colonizing winter   Viral swab is negative for HSV & VZV    Fever in evening of 10/11 to 102.8  She was started on vanco and meropenem-temps are down  She has failed swallow evaluation  CXR with no clear infiltrates, MRI of brain pending.  Heme Onc note appreciated, CLL and possible Ovarian  malignancy, GYN to decide on biopsy  Hypercalcemia being treated.  MRI now with dural thickening  Suggest:  1.Valtrex per derm  2.Vanco/meropenem empiric, will limit to 5 days if blood cultures  remain negative, possible small aspiration.(day 4/5)  3.Await decision on additional neuro w/u, ? role of LP  4.Goals of care will be important  5.[place] guarded

## 2021-10-15 NOTE — PROGRESS NOTE ADULT - ASSESSMENT
Patient is a 80 PMHx of new onset CLL, with possible staging of ovarian cancer, HTN, HLD, CC s/p fall and fevers.  Per daughter, pt lives at home alone, fell two times while getting out of bed. States she fell onto both hands and knees while rolling out of bed. No LOC.  Denied any lightheadedness, dizziness at the time of fall.  Currently states she has some dizziness, like she would fall over if she had to walk. Was brought in today due to having a fever. PT complaining of SOB as well, and has pain in both knees. Denies cough, headache, chest pain, nausea, vomiting. Has not started any chemotherapy per daughter    #Leukocytosis with fever  -Patient is afebrile  -As per ID patient continue with Vancomycin and Meropenam for elevated WBC count, will monitor  -WBC count downtrending   -As per ID, may need lumbar puncture , likely outpatien tif recurrent MRI findings ater repeat imagin in 1-2 month   -culture results no growth to date   -Procalcitonin level noted   -MR brain completed 10/12, results noted   - passed S7S, oral feeding, aspiration precautions     # AMS and dizziness   CT head noted  fall precautions  orthostatics  Neurology eval appreciated follow up recs  -As per neuro brain MRI w/ and w/o IV contrast completed 10/12, results noted, as per Neurology, palliative care was consulted    - dural thickening noted on MRI. discussed with neurology. patient to repeat MRI in 1-2 month and if no improvement or recurrent findings, she may need LP to R/O leptomeningeal disease.   will discuss plan with patient's daughter     # Sepsis  R/O sepsis   UTI vs tumor fever vs other etiologies   Pan cx   Completed course of Rocephin, stopped on 10/08 as per ID  monitor clinically   ID eval appreciated   Hydration as tolerated  Pan CT noted      # HARJIT   HARJIT will monitor renal function  -Elevated calcium, as per renal Lasix is on hold due to likely dehydration, repeat BMP to monitor, and limit fluids to 50cc/hr for 1L, ionized calcium improving   Monitor renal function  I and Os   As per nephrology, monitor renal function and avoid nephrotoxic   -As per renal, monitor Na levels, free water restriction to 1L/day    #Hypercalcemia   -PTH level and Calcium levels noted. Will get endo evaluation   -As per heme/onc, IVF started BS 50cc/Hr or 24 hours  -Ionized calcium improving today. Will monitor       # Ovarian Ca, as per GYN/Onc there is no ovarian CA   likely due to her CLL   seen on CT   onc follow up   Patient follows with TRUNG, outpatient follow up     # HTN   No on an antihypertensive medication   Monitor BP      # HLD   lipid panel     #Wheezing, fluid overload likely   Cont Duonebs  will adjust diuresis, on hold for now   BNP results noted. Will follow  Echo ordered  Card wali appreciated     DVT and GI PPX

## 2021-10-15 NOTE — PROGRESS NOTE ADULT - SUBJECTIVE AND OBJECTIVE BOX
Date of Service   10-15-21 @ 12:46    Patient is a 80y old  Female who presents with a chief complaint of AMS (15 Oct 2021 09:04)      INTERVAL HISTORY: pt feels ok     TELEMETRY Personally reviewed: SB-SR 50'S-60'S     REVIEW OF SYSTEMS:   CONSTITUTIONAL: No weakness  EYES/ENT: No visual changes; No throat pain  Neck: No pain or stiffness  Respiratory: No cough, wheezing, No shortness of breath  CARDIOVASCULAR: no chest pain or palpitations  GASTROINTESTINAL: No abdominal pain, no nausea, vomiting or hematemesis  GENITOURINARY: No dysuria, frequency or hematuria  NEUROLOGICAL: No stroke like symptoms  SKIN: No rashes    	  MEDICATIONS:        PHYSICAL EXAM:  T(C): 36.4 (10-15-21 @ 09:04), Max: 37.1 (10-14-21 @ 13:25)  HR: 60 (10-15-21 @ 09:04) (60 - 69)  BP: 134/76 (10-15-21 @ 09:04) (118/53 - 135/86)  RR: 18 (10-15-21 @ 09:04) (18 - 18)  SpO2: 98% (10-15-21 @ 09:04) (94% - 98%)  Wt(kg): --  I&O's Summary    14 Oct 2021 07:01  -  15 Oct 2021 07:00  --------------------------------------------------------  IN: 865 mL / OUT: 1525 mL / NET: -660 mL    15 Oct 2021 07:01  -  15 Oct 2021 12:46  --------------------------------------------------------  IN: 0 mL / OUT: 250 mL / NET: -250 mL          Appearance: In no distress	  HEENT:    PERRL, EOMI	  Cardiovascular:  S1 S2, No JVD  Respiratory: Lungs clear to auscultation	  Gastrointestinal:  Soft, Non-tender, + BS	  Vascularature:  No edema of LE  Psychiatric: Appropriate affect   Neuro: no acute focal deficits                               10.3   22.33 )-----------( 178      ( 15 Oct 2021 07:23 )             30.7     10-15    134<L>  |  101  |  32<H>  ----------------------------<  93  4.5   |  22  |  0.98    Ca    10.2      15 Oct 2021 07:27    TPro  5.1<L>  /  Alb  2.8<L>  /  TBili  0.4  /  DBili  x   /  AST  71<H>  /  ALT  25  /  AlkPhos  147<H>  10-15        Labs personally reviewed  < from: MR Head w/wo IV Cont (10.12.21 @ 22:23) >  IMPRESSION:  Mild diffuse dural thickening/enhancement along the hemispheric convexities. While this may be secondary to recent lumbar puncture, other inflammatory or neoplastic etiologies are not excluded. Continued follow-up is advised.    Otherwise no mass effect or acute cerebral ischemia.    < end of copied text >      ASSESSMENT/PLAN: 	      Patient is a 80 PMHx of new onset CLL, with possible staging of ovarian cancer, HTN, HLD, CC s/p fall and fevers.  Per daughter, pt lives at home alone, fell two times while getting out of bed. States she fell onto both hands and knees while rolling out of bed. No LOC.  Denied any lightheadedness, dizziness at the time of fall.  Currently states she has some dizziness, like she would fall over if she had to walk. Was brought in today due to having a fever. PT complaining of SOB as well, and has pain in both knees. Denies cough, headache, chest pain, nausea, vomiting. Has not started any chemotherapy per daughter    # AMS and dizziness   check orthostatics  Neurology eval recommends MRI of brain which was negative for mass or cerebral ischemia as noted above   Unlikely cardiac in nature  barrium swallow eval done 10/14 was started on diet    palliative eval appreciated   pt febrile     # SOB  d/pablito IVF as CXR with mild pulm vasc congestion  - Recurrent SOB again this am  - Lasix 40mg IV daily on hold   - close monitor Cr given recent HARJIT    # HARJIT   per nephro:  Baseline Scr unclear  HARJIT possibly sec to contrast s/p 10/4   Has high ca as well, possibly dehydrated  Monitor renal function at present  Avoid nephrotoxics, NSAID's RCA    # HTN   continue BP meds  adjust as tolerated   BP stable     # HLD   lipid panel normal except HDL 15        Jones Kern FNP-BC   Dallas Monaco DO [facility]  Cardiovascular Medicine  [address] [place] [address], [address]  Office: [phone]  Cell: [phone]

## 2021-10-15 NOTE — PROGRESS NOTE ADULT - SUBJECTIVE AND OBJECTIVE BOX
Dr. Lowe  Office [phone]  Cell [phone]  Tanika SAMS  Cell [phone]    RENAL PROGRESS NOTE: DATE OF SERVICE 10-15-21 @ 12:52    Patient is a 80y old  Female who presents with a chief complaint of AMS (15 Oct 2021 09:04)      Patient seen and examined at bedside. No chest pain/sob    VITALS:  T(F): 97.5 (10-15-21 @ 09:04), Max: 98.7 (10-14-21 @ 13:25)  HR: 60 (10-15-21 @ 09:04)  BP: 134/76 (10-15-21 @ 09:04)  RR: 18 (10-15-21 @ 09:04)  SpO2: 98% (10-15-21 @ 09:04)  Wt(kg): --    10-14 @ 07:01  -  10-15 @ 07:00  --------------------------------------------------------  IN: 865 mL / OUT: 1525 mL / NET: -660 mL    10-15 @ 07:01  -  10-15 @ 12:52  --------------------------------------------------------  IN: 0 mL / OUT: 250 mL / NET: -250 mL          PHYSICAL EXAM:  Constitutional: NAD  Neck: No JVD  Respiratory: CTAB, no wheezes, rales or rhonchi  Cardiovascular: S1, S2, RRR  Gastrointestinal: BS+, soft, NT/ND  Extremities: No peripheral edema    Hospital Medications:   MEDICATIONS  (STANDING):  amitriptyline 10 milliGRAM(s) Oral at bedtime  ascorbic acid 500 milliGRAM(s) Oral daily  heparin   Injectable 5000 Unit(s) SubCutaneous every 8 hours  influenza   Vaccine 0.5 milliLiter(s) IntraMuscular once  lidocaine   4% Patch 1 Patch Transdermal every 24 hours  LORazepam     Tablet 2 milliGRAM(s) Oral two times a day  meropenem  IVPB 1000 milliGRAM(s) IV Intermittent every 12 hours  multivitamin 1 Tablet(s) Oral daily  perphenazine 2 milliGRAM(s) Oral at bedtime  polyethylene glycol 3350 17 Gram(s) Oral daily  senna 2 Tablet(s) Oral at bedtime  sodium chloride 0.9%. 1000 milliLiter(s) (50 mL/Hr) IV Continuous <Continuous>  sodium chloride 0.9%. 1000 milliLiter(s) (50 mL/Hr) IV Continuous <Continuous>  sodium chloride 0.9%. 1000 milliLiter(s) (50 mL/Hr) IV Continuous <Continuous>  valACYclovir 500 milliGRAM(s) Oral two times a day  vancomycin  IVPB 1000 milliGRAM(s) IV Intermittent every 24 hours      LABS:  10-15    134<L>  |  101  |  32<H>  ----------------------------<  93  4.5   |  22  |  0.98    Ca    10.2      15 Oct 2021 07:27    TPro  5.1<L>  /  Alb  2.8<L>  /  TBili  0.4  /  DBili      /  AST  71<H>  /  ALT  25  /  AlkPhos  147<H>  10-15    Creatinine Trend: 0.98 <--, 1.05 <--, 1.40 <--, 1.60 <--, 1.55 <--, 1.55 <--, 1.55 <--    Albumin, Serum: 2.8 g/dL (10-15 @ 07:27)                              10.3   22.33 )-----------( 178      ( 15 Oct 2021 07:23 )             30.7     Urine Studies:  Urinalysis - [10-05-21 @ 17:05]      Color Yellow / Appearance Clear / SG 1.033 / pH 6.0      Gluc Negative / Ketone Negative  / Bili Negative / Urobili Negative       Blood Moderate / Protein 100 / Leuk Est Negative / Nitrite Negative      RBC 2 / WBC 3 / Hyaline 1 / Gran  / Sq Epi  / Non Sq Epi 2 / Bacteria Negative    Urine Creatinine 79      [10-10-21 @ 17:00]  Urine Urea Nitrogen 357      [10-10-21 @ 21:55]    PTH -- (Ca 10.4)      [10-14-21 @ 11:40]   12  PTH -- (Ca 12.2)      [10-11-21 @ 08:51]   8  Vitamin D (25OH) 65.1      [10-12-21 @ 08:57]  TSH 1.15      [10-05-21 @ 16:30]  Lipid: chol 87, , HDL 15, LDL --      [10-05-21 @ 16:30]      Free Light Chains: kappa 22.16, lambda 2.82, ratio = 7.86      [10-07 @ 17:52]    RADIOLOGY & ADDITIONAL STUDIES:

## 2021-10-15 NOTE — PROGRESS NOTE ADULT - SUBJECTIVE AND OBJECTIVE BOX
Name of Patient : PREM KAY  MRN: [id number]  Date of visit: 10-15-21     Subjective: Patient seen and examined. No new events except as noted.   Alhaji gomes   more awake     MEDICATIONS:  MEDICATIONS  (STANDING):  amitriptyline 10 milliGRAM(s) Oral at bedtime  ascorbic acid 500 milliGRAM(s) Oral daily  heparin   Injectable 5000 Unit(s) SubCutaneous every 8 hours  influenza   Vaccine 0.5 milliLiter(s) IntraMuscular once  lidocaine   4% Patch 1 Patch Transdermal every 24 hours  LORazepam     Tablet 2 milliGRAM(s) Oral two times a day  meropenem  IVPB 1000 milliGRAM(s) IV Intermittent every 12 hours  multivitamin 1 Tablet(s) Oral daily  perphenazine 2 milliGRAM(s) Oral at bedtime  polyethylene glycol 3350 17 Gram(s) Oral daily  senna 2 Tablet(s) Oral at bedtime  sodium chloride 0.9%. 1000 milliLiter(s) (50 mL/Hr) IV Continuous <Continuous>  sodium chloride 0.9%. 1000 milliLiter(s) (50 mL/Hr) IV Continuous <Continuous>  sodium chloride 0.9%. 1000 milliLiter(s) (50 mL/Hr) IV Continuous <Continuous>  valACYclovir 500 milliGRAM(s) Oral two times a day  vancomycin  IVPB 1000 milliGRAM(s) IV Intermittent every 24 hours      PHYSICAL EXAM:  T(C): 36.4 (10-15-21 @ 09:04), Max: 37 (10-14-21 @ 21:08)  HR: 60 (10-15-21 @ 09:04) (60 - 69)  BP: 134/76 (10-15-21 @ 09:04) (124/67 - 135/86)  RR: 18 (10-15-21 @ 09:04) (18 - 18)  SpO2: 98% (10-15-21 @ 09:04) (95% - 98%)  Wt(kg): --  I&O's Summary    14 Oct 2021 07:01  -  15 Oct 2021 07:00  --------------------------------------------------------  IN: 865 mL / OUT: 1525 mL / NET: -660 mL    15 Oct 2021 07:01  -  15 Oct 2021 17:33  --------------------------------------------------------  IN: 610 mL / OUT: 500 mL / NET: 110 mL          Appearance: awake, clam, follows simple commands   HEENT:  PERRLA   Lymphatic: No lymphadenopathy   Cardiovascular: Normal S1 S2, no JVD  Respiratory: normal effort , clear  Gastrointestinal:  Soft, Non-tender  Skin: No rashes,  warm to touch  Psychiatry:  Mood & affect appropriate  Musculuskeletal: No edema      All labs, Imaging and EKGs personally reviewed     10-14-21 @ 07:01  -  10-15-21 @ 07:00  --------------------------------------------------------  IN: 865 mL / OUT: 1525 mL / NET: -660 mL    10-15-21 @ 07:01  -  10-15-21 @ 17:33  --------------------------------------------------------  IN: 610 mL / OUT: 500 mL / NET: 110 mL                          10.3   22.33 )-----------( 178      ( 15 Oct 2021 07:23 )             30.7               10-15    134<L>  |  101  |  32<H>  ----------------------------<  93  4.5   |  22  |  0.98    Ca    10.2      15 Oct 2021 07:27    TPro  5.1<L>  /  Alb  2.8<L>  /  TBili  0.4  /  DBili  x   /  AST  71<H>  /  ALT  25  /  AlkPhos  147<H>  10-15

## 2021-10-15 NOTE — PROGRESS NOTE ADULT - SUBJECTIVE AND OBJECTIVE BOX
Neurology Progress Note    S: Patient seen and examined. resting in bed. doing okay. MRI with dural enhancement palliative called     Medication:  MEDICATIONS  (STANDING):  amitriptyline 10 milliGRAM(s) Oral at bedtime  ascorbic acid 500 milliGRAM(s) Oral daily  heparin   Injectable 5000 Unit(s) SubCutaneous every 8 hours  influenza   Vaccine 0.5 milliLiter(s) IntraMuscular once  lidocaine   4% Patch 1 Patch Transdermal every 24 hours  LORazepam     Tablet 2 milliGRAM(s) Oral two times a day  meropenem  IVPB 1000 milliGRAM(s) IV Intermittent every 12 hours  multivitamin 1 Tablet(s) Oral daily  perphenazine 2 milliGRAM(s) Oral at bedtime  polyethylene glycol 3350 17 Gram(s) Oral daily  senna 2 Tablet(s) Oral at bedtime  sodium chloride 0.9%. 1000 milliLiter(s) (50 mL/Hr) IV Continuous <Continuous>  sodium chloride 0.9%. 1000 milliLiter(s) (50 mL/Hr) IV Continuous <Continuous>  sodium chloride 0.9%. 1000 milliLiter(s) (50 mL/Hr) IV Continuous <Continuous>  valACYclovir 500 milliGRAM(s) Oral two times a day  vancomycin  IVPB 1000 milliGRAM(s) IV Intermittent every 24 hours    MEDICATIONS  (PRN):  acetaminophen   Tablet .. 650 milliGRAM(s) Oral every 6 hours PRN Temp greater or equal to 38C (100.4F)  acetaminophen   Tablet .. 650 milliGRAM(s) Oral every 6 hours PRN Mild Pain (1 - 3), Moderate Pain (4 - 6)  albuterol/ipratropium for Nebulization 3 milliLiter(s) Nebulizer every 6 hours PRN Shortness of Breath and/or Wheezing        Vitals:  Vital Signs Last 24 Hrs  T(C): 36.4 (10-15-21 @ 09:04), Max: 37.1 (10-14-21 @ 13:25)  T(F): 97.5 (10-15-21 @ 09:04), Max: 98.7 (10-14-21 @ 13:25)  HR: 60 (10-15-21 @ 09:04) (60 - 69)  BP: 134/76 (10-15-21 @ 09:04) (118/53 - 135/86)  BP(mean): --  RR: 18 (10-15-21 @ 09:04) (18 - 18)  SpO2: 98% (10-15-21 @ 09:04) (94% - 98%)        General Exam:   General Appearance: Appropriately dressed and in no acute distress       Head: Normocephalic, atraumatic and no dysmorphic features  Ear, Nose, and Throat: Moist mucous membranes  CVS: S1S2+  Resp: No SOB, no wheeze or rhonchi  GI: soft NT/ND  Extremities: No edema or cyanosis  Skin: No bruises or rashes     Neurological Exam:  Mental Status: Awake, alert and oriented x 1-2.  Able to follow simple and complex verbal commands. Able to name and repeat. fluent speech. No obvious aphasia or dysarthria noted. masked facies. + frontal release sigbns  more leathargif   Cranial Nerves: PERRL, EOMI, VFFC, sensation V1-V3 intact,  L facial asymmetry, equal elevation of palate, scm/trap 5/5, tongue is midline on protrusion. no obvious papilledema on fundoscopic exam. hearing is grossly intact.   Motor:HASTINGS but + tremor L>R moves Uppers>lowers   Sensation: Intact to light touch and pinprick throughout. no right/left confusion. no extinction to tactile on DSS.   Reflexes: 1+ throughout at biceps, brachioradialis, triceps, patellars and ankles bilaterally and equal. No clonus. R toe and L toe were both downgoing.  Coordination: no dysmetria FNF  Gait: deferred   I personally reviewed the below data/images/labs:      CBC Full  -  ( 15 Oct 2021 07:23 )  WBC Count : 22.33 K/uL  RBC Count : 3.09 M/uL  Hemoglobin : 10.3 g/dL  Hematocrit : 30.7 %  Platelet Count - Automated : 178 K/uL  Mean Cell Volume : 99.4 fl  Mean Cell Hemoglobin : 33.3 pg  Mean Cell Hemoglobin Concentration : 33.6 gm/dL  Auto Neutrophil # : x  Auto Lymphocyte # : x  Auto Monocyte # : x  Auto Eosinophil # : x  Auto Basophil # : x  Auto Neutrophil % : x  Auto Lymphocyte % : x  Auto Monocyte % : x  Auto Eosinophil % : x  Auto Basophil % : x    10-15    134<L>  |  101  |  32<H>  ----------------------------<  93  4.5   |  22  |  0.98    Ca    10.2      15 Oct 2021 07:27    TPro  5.1<L>  /  Alb  2.8<L>  /  TBili  0.4  /  DBili  x   /  AST  71<H>  /  ALT  25  /  AlkPhos  147<H>  10-15          < from: CT Head No Cont (10.04.21 @ 09:07) >      EXAM:  CT CERVICAL SPINE                          EXAM:  CT BRAIN                                PROCEDURE DATE:  10/04/2021            INTERPRETATION:  CLINICAL STATEMENT: Trauma..    TECHNIQUE: CT of the head and cervical spine were performed withoutIV contrast. 3-D/MIP images obtained    COMPARISON: None.    FINDINGS:  Head:  There is mild diffuse parenchymal volume loss. There are areas of low attenuation in the periventricular white matter likely related to mild chronic microvascular ischemicchanges.    There is no acute intracranial hemorrhage, parenchymal mass, mass effect or midline shift. There is no acute territorial infarct. There is no hydrocephalus. Partial empty sella    The cranium is intact.    Mild mucosal thickening right maxillary sinus    Cervical spine:  Vertebral body heights intact. Straightening of the cervical lordosis. Grade 1 retrolisthesis C3 on C4. Grade 1 anterolisthesis of C5 on C6    There is no prevertebral soft tissue swelling. The odontoid is intact.    Evaluation of the spinal canal is limited on a CT exam.  Multilevel degenerative changes noted resulting in multilevel spinal canal stenosis and neural foraminal narrowing.    Partially visualized cervical lymphadenopathy noted with multiple enlarged lymph nodes. Calcifications noted within the thyroid gland.    IMPRESSION:  No acute intracranial hemorrhage.    No acute fracture cervical spine. If pain persists, follow-up MRI exam recommended    Partially visualized cervical lymphadenopathy suspicious for lymphoma or metastatic disease. Correlate clinically    --- End of Report ---                ROBI RUTH MD; Attending Radiologist  This document has been electronically signed. Oct  4 2021  9:22AM    < end of copied text >      < from: CT Chest No Cont (10.04.21 @ 20:17) >    EXAM:  CT CHEST                            PROCEDURE DATE:  10/04/2021            INTERPRETATION:  CLINICAL INFORMATION: Shortness of breath, evaluate for pneumonia    COMPARISON: CT abdomen and pelvis 10/4/2021    CONTRAST/COMPLICATIONS:  IV Contrast: None  Oral Contrast: None  Complications: No immediate complication    PROCEDURE:  CT of the Chest was performed.  Sagittal and coronal reformats were performed.    FINDINGS:    LUNGS AND AIRWAYS: Patent central airways.  Calcified nodule abutting the left anterior pleura (series 4, image 57). 3 mm nodule in the left upper lobe (series 4, image 39). Additional 2 mm nodule in the left upper lobe (series 4, image 58).  PLEURA: No pleural effusion.  MEDIASTINUM AND ERICA: Multiple enlarged supraclavicular lymph nodes. Additional axillary and mediastinal lymphadenopathy is seen.  VESSELS: Within normal limits.  HEART: Heart size is normal. No pericardial effusion.  CHEST WALL AND LOWER NECK: Calcifications within the bilateral thyroid lobes.  VISUALIZED UPPER ABDOMEN: Please refer to prior CT abdomen/pelvis.  BONES: Degenerative changes of the spine.    IMPRESSION:  Lymphadenoapthy described above is consistent with CLL as per patient history.    No CT evidence for pneumonia.                --- End of Report ---              KADEEM YE MD; Resident Radiology  This document has been electronically signed.  REYMUNDO VARGAS MD; Attending Radiologist  This document has been electronically signed. Oct  5 2021 12:09PM    < end of copied text >  < from: CT Abdomen and Pelvis w/ IV Cont (10.04.21 @ 09:30) >    EXAM:  CT ABDOMEN AND PELVIS IC                            PROCEDURE DATE:  10/04/2021            INTERPRETATION:  CLINICAL INFORMATION: Left lower quadrant pain    COMPARISON: None.    CONTRAST/COMPLICATIONS:  IV Contrast: Omnipaque 350  90 cc administered   10 cc discarded  Oral Contrast: None  Complications: None    PROCEDURE:  CT of the Abdomen and Pelvis was performed.  Sagittal and coronal reformats were performed.    FINDINGS:  LOWER CHEST: Bibasilar atelectasis. Partially imaged enlarged right axillary lymph nodes.    LIVER: Within normal limits.  BILE DUCTS: Normal caliber.  GALLBLADDER: Within normal limits.  SPLEEN: Within normal limits.  PANCREAS: Fatty atrophy of the pancreas without cutoff. No ductal dilatation or cutoff.  ADRENALS: Within normal limits.  KIDNEYS/URETERS: Within normal limits.    BLADDER/REPRODUCTIVE ORGANS: Multiloculated right adnexal cystic mass measuring 14.6 x 9.0 cm, which abuts the appendix. Left adnexal cyst measures 2.1 x 1.4 cm. Uterus is unremarkable.    BOWEL: No bowel obstruction. Appendix is normal. Colonic diverticulosis.  PERITONEUM: 2 small nodules are present adjacent to the cystic lesion measuring 4 and 6 mm in diameter, respectively (2:75 and 79), possibly peritoneal nodules or small pericolonic lymph nodes.  VESSELS: Within normal limits.  RETROPERITONEUM/LYMPH NODES: Enlarged bilateral inguinal, iliac, mesenteric, and retroperitoneal lymph nodes.  *  Reference left inguinal lymph node measures up to 4.0 x 2.5 cm (2:93).  *  Reference left periaortic lymph node measures up to 2.5 x 2.3 cm (2:45).  ABDOMINAL WALL: Within normal limits.  BONES: Degenerative changes. Lumbar dextroscoliosis.    IMPRESSION:  Enlarged bilateral inguinal, iliac, mesenteric, and retroperitoneal lymph nodes. Partially imaged enlarged right axillary lymph nodes. Primary consideration is lymphoma. Metastatic disease is not excluded. Dedicated chest imaging is recommended for full evaluation.    Cystic right adnexal mass measuring 14.6 x 9.0 cm, concerning for epithelial neoplasm.      --- End of Report ---      WALTER ALANIS MD; Resident Interventional Radiology  This document has been electronically signed.  DAREN MOHAN MD; Attending Radiologist  This document has been electronically signed. Oct  4 2021 11:14AM    < end of copied text >        < from: MR Head w/wo IV Cont (10.12.21 @ 22:23) >    EXAM:  MR BRAIN WAW IC                            PROCEDURE DATE:  10/12/2021            INTERPRETATION:  INDICATIONS:  Change in mental status. History of CLL with new ovarian mass.    TECHNIQUE:  Multiplanar imaging was performed using T1 weighted, T2 weighted and FLAIR sequences.  Diffusion weighted and susceptibility sensitive images were also obtained.  Following intravenous gadolinium, multiplanar T1 weighted images were performed. 7.5 cc Gadavist were administered. 0 cc were discarded.    COMPARISON EXAMINATION:  10/4/2021    FINDINGS: The study is degraded by motion artifact. Repeat sequences were performed.  VENTRICLES AND SULCI:  Prominent in size compatible with age-appropriate volume loss  INTRA-AXIAL:  Patchy areas of white matter T2 hyperintensity are seen compatible with mild microvascular-type changes. No mass effect or abnormal enhancement is seen. No diffusion restriction is seen to suggest acute ischemia.  EXTRA-AXIAL:  There is diffuse bilateral hemispheric mild dural thickening and enhancement demonstrating increased FLAIR signal and mild nodularity.  VISUALIZED SINUSES:  Normal.  VISUALIZED MASTOIDS:  Mild nonspecific soft tissue bilaterally  CALVARIUM:  Normal.  CAROTID FLOW VOIDS:  Present.  MISCELLANEOUS:  Left intraocular lens implant      IMPRESSION:  Mild diffuse dural thickening/enhancement along the hemispheric convexities. While this may be secondary to recent lumbar puncture, other inflammatory or neoplastic etiologies are not excluded. Continued follow-up is advised.    Otherwise no mass effect or acute cerebral ischemia.    --- End of Report ---                UGO SZYMANSKI MD; Attending Radiologist  This document has been electronically signed. Oct 13 2021 10:25AM    < end of copied text >

## 2021-10-15 NOTE — PROGRESS NOTE ADULT - ASSESSMENT
79yo F with  new onset CLL, with possible staging of ovarian cancer, HTN, HLD, RA, anxiety, s/p fall and fevers.   AMS likely 2/2 infection.  dizziness better + UTI  CTH and CT c spien unremarkable   CT C A P  LDL 44, A1c 5.2  b12 WNL  MRI brain w/ adn w/o shows dural enhancement   10/12 tmax 102.8 started vanco/toby, more lethargic   10/13 MRI as above   - MRI findings c/w possible neoplastic CNS process from CLL vs meningitis. never had LP.  clinically she is improving on vanco/toby for presumed aspiration PNA.  need to clarify GOC with family.  would consider holding off agressive workup with brain bx or LP until repeat imaging in 2-4weeks with and w/o for evolution and after GOC discussion especially given clinical improvement in mental status   - infectious workup on vanco/toby. pervsiously ctx for uti.   - heme/onc for CLL. recs appreciated   - HARJIT, IVF  - check orthostatics   - PT/OT/SS/SLP, OOBC  - check FS, glucose control <180  - GI/DVT ppx  - Counseling on diet, exercise, and medication adherence was done  - Counseling on smoking cessation and alcohol consumption offered when appropriate.  - Pain assessed and judicious use of narcotics when appropriate was discussed.    - Stroke education given when appropriate.  - Importance of fall prevention discussed.   - Differential diagnosis and plan of care discussed with patient and/or family and primary team  - Thank you for allowing me to participate in the care of this patient. Call with questions.   - d/c planning after infectious workup  RA Thompson MD  Vascular Neurology  Office: [phone]

## 2021-10-15 NOTE — PROGRESS NOTE ADULT - SUBJECTIVE AND OBJECTIVE BOX
CC: f/u for fever and weakness    Patient reports: she seems more awake this AM and is attentive    REVIEW OF SYSTEMS:  All other review of systems negative (Comprehensive ROS)    Antimicrobials Day #  :day 4  meropenem  IVPB 1000 milliGRAM(s) IV Intermittent every 12 hours  valACYclovir 500 milliGRAM(s) Oral two times a day  vancomycin  IVPB 1000 milliGRAM(s) IV Intermittent every 24 hours    Other Medications Reviewed  MEDICATIONS  (STANDING):  amitriptyline 10 milliGRAM(s) Oral at bedtime  ascorbic acid 500 milliGRAM(s) Oral daily  heparin   Injectable 5000 Unit(s) SubCutaneous every 8 hours  influenza   Vaccine 0.5 milliLiter(s) IntraMuscular once  lidocaine   4% Patch 1 Patch Transdermal every 24 hours  LORazepam     Tablet 2 milliGRAM(s) Oral two times a day  meropenem  IVPB 1000 milliGRAM(s) IV Intermittent every 12 hours  multivitamin 1 Tablet(s) Oral daily  perphenazine 2 milliGRAM(s) Oral at bedtime  polyethylene glycol 3350 17 Gram(s) Oral daily  senna 2 Tablet(s) Oral at bedtime  sodium chloride 0.9%. 1000 milliLiter(s) (50 mL/Hr) IV Continuous <Continuous>  sodium chloride 0.9%. 1000 milliLiter(s) (50 mL/Hr) IV Continuous <Continuous>  sodium chloride 0.9%. 1000 milliLiter(s) (50 mL/Hr) IV Continuous <Continuous>  valACYclovir 500 milliGRAM(s) Oral two times a day  vancomycin  IVPB 1000 milliGRAM(s) IV Intermittent every 24 hours    T(F): 97.5 (10-15-21 @ 05:19), Max: 98.7 (10-14-21 @ 13:25)  HR: 64 (10-15-21 @ 05:19)  BP: 128/70 (10-15-21 @ 05:19)  RR: 18 (10-15-21 @ 05:19)  SpO2: 95% (10-15-21 @ 05:19)  Wt(kg): --    PHYSICAL EXAM:  General: attentive, no acute distress  Eyes:  anicteric, no conjunctival injection, no discharge  Oropharynx: no lesions or injection 	  Neck: supple, without adenopathy  Lungs: few ronchi  Heart: regular rate and rhythm; no murmur, rubs or gallops  Abdomen: soft, nondistended, nontender, without mass or organomegaly  Skin: no lesions  Extremities: no clubbing, cyanosis, + edema  Neurologic: alert, ? oriented, moves all extremities    LAB RESULTS:                        10.3   22.33 )-----------( 178      ( 15 Oct 2021 07:23 )             30.7     10-14    134<L>  |  99  |  38<H>  ----------------------------<  91  4.2   |  21<L>  |  1.05    Ca    10.8<H>      14 Oct 2021 07:04    TPro  5.1<L>  /  Alb  2.8<L>  /  TBili  0.4  /  DBili  x   /  AST  73<H>  /  ALT  23  /  AlkPhos  145<H>  10-14    LIVER FUNCTIONS - ( 14 Oct 2021 07:04 )  Alb: 2.8 g/dL / Pro: 5.1 g/dL / ALK PHOS: 145 U/L / ALT: 23 U/L / AST: 73 U/L / GGT: x             MICROBIOLOGY:  RECENT CULTURES:  10-11 @ 18:36 .Blood Blood-Peripheral     No growth to date.      10-11 @ 18:25 Clean Catch Clean Catch (Midstream)     <10,000 CFU/mL Normal Urogenital Kaylyn          RADIOLOGY REVIEWED:  < from: MR Head w/wo IV Cont (10.12.21 @ 22:23) >  IMPRESSION:  Mild diffuse dural thickening/enhancement along the hemispheric convexities. While this may be secondary to recent lumbar puncture, other inflammatory or neoplastic etiologies are not excluded. Continued follow-up is advised.    Otherwise no mass effect or acute cerebral ischemia.    < end of copied text >  < from: Xray Chest 1 View- PORTABLE-Urgent (Xray Chest 1 View- PORTABLE-Urgent .) (10.11.21 @ 20:51) >  IMPRESSION:  Patchy atelectasis at the left lung base.  Enteric tube tip is coiled inside the stomach.

## 2021-10-16 LAB
ANION GAP SERPL CALC-SCNC: 8 MMOL/L — SIGNIFICANT CHANGE UP (ref 5–17)
BUN SERPL-MCNC: 22 MG/DL — SIGNIFICANT CHANGE UP (ref 7–23)
CALCIUM SERPL-MCNC: 9.7 MG/DL — SIGNIFICANT CHANGE UP (ref 8.4–10.5)
CHLORIDE SERPL-SCNC: 104 MMOL/L — SIGNIFICANT CHANGE UP (ref 96–108)
CO2 SERPL-SCNC: 24 MMOL/L — SIGNIFICANT CHANGE UP (ref 22–31)
CREAT SERPL-MCNC: 0.84 MG/DL — SIGNIFICANT CHANGE UP (ref 0.5–1.3)
CULTURE RESULTS: SIGNIFICANT CHANGE UP
CULTURE RESULTS: SIGNIFICANT CHANGE UP
GLUCOSE SERPL-MCNC: 88 MG/DL — SIGNIFICANT CHANGE UP (ref 70–99)
HCT VFR BLD CALC: 33.2 % — LOW (ref 34.5–45)
HGB BLD-MCNC: 10.9 G/DL — LOW (ref 11.5–15.5)
MCHC RBC-ENTMCNC: 32.8 GM/DL — SIGNIFICANT CHANGE UP (ref 32–36)
MCHC RBC-ENTMCNC: 33.3 PG — SIGNIFICANT CHANGE UP (ref 27–34)
MCV RBC AUTO: 101.5 FL — HIGH (ref 80–100)
NRBC # BLD: 0 /100 WBCS — SIGNIFICANT CHANGE UP (ref 0–0)
PLATELET # BLD AUTO: 181 K/UL — SIGNIFICANT CHANGE UP (ref 150–400)
POTASSIUM SERPL-MCNC: 4.5 MMOL/L — SIGNIFICANT CHANGE UP (ref 3.5–5.3)
POTASSIUM SERPL-SCNC: 4.5 MMOL/L — SIGNIFICANT CHANGE UP (ref 3.5–5.3)
RBC # BLD: 3.27 M/UL — LOW (ref 3.8–5.2)
RBC # FLD: 22.9 % — HIGH (ref 10.3–14.5)
SODIUM SERPL-SCNC: 136 MMOL/L — SIGNIFICANT CHANGE UP (ref 135–145)
SPECIMEN SOURCE: SIGNIFICANT CHANGE UP
SPECIMEN SOURCE: SIGNIFICANT CHANGE UP
WBC # BLD: 22.34 K/UL — HIGH (ref 3.8–10.5)
WBC # FLD AUTO: 22.34 K/UL — HIGH (ref 3.8–10.5)

## 2021-10-16 RX ADMIN — HEPARIN SODIUM 5000 UNIT(S): 5000 INJECTION INTRAVENOUS; SUBCUTANEOUS at 21:45

## 2021-10-16 RX ADMIN — Medication 2 MILLIGRAM(S): at 06:00

## 2021-10-16 RX ADMIN — MEROPENEM 100 MILLIGRAM(S): 1 INJECTION INTRAVENOUS at 05:58

## 2021-10-16 RX ADMIN — HEPARIN SODIUM 5000 UNIT(S): 5000 INJECTION INTRAVENOUS; SUBCUTANEOUS at 13:07

## 2021-10-16 RX ADMIN — Medication 2 MILLIGRAM(S): at 17:26

## 2021-10-16 RX ADMIN — LIDOCAINE 1 PATCH: 4 CREAM TOPICAL at 19:43

## 2021-10-16 RX ADMIN — Medication 650 MILLIGRAM(S): at 06:27

## 2021-10-16 RX ADMIN — HEPARIN SODIUM 5000 UNIT(S): 5000 INJECTION INTRAVENOUS; SUBCUTANEOUS at 05:58

## 2021-10-16 RX ADMIN — PERPHENAZINE 2 MILLIGRAM(S): 8 TABLET, FILM COATED ORAL at 21:45

## 2021-10-16 RX ADMIN — Medication 650 MILLIGRAM(S): at 05:57

## 2021-10-16 RX ADMIN — Medication 500 MILLIGRAM(S): at 11:02

## 2021-10-16 RX ADMIN — SENNA PLUS 2 TABLET(S): 8.6 TABLET ORAL at 21:45

## 2021-10-16 RX ADMIN — VALACYCLOVIR 500 MILLIGRAM(S): 500 TABLET, FILM COATED ORAL at 17:26

## 2021-10-16 RX ADMIN — Medication 650 MILLIGRAM(S): at 15:30

## 2021-10-16 RX ADMIN — POLYETHYLENE GLYCOL 3350 17 GRAM(S): 17 POWDER, FOR SOLUTION ORAL at 11:02

## 2021-10-16 RX ADMIN — Medication 650 MILLIGRAM(S): at 14:57

## 2021-10-16 RX ADMIN — Medication 10 MILLIGRAM(S): at 21:45

## 2021-10-16 RX ADMIN — LIDOCAINE 1 PATCH: 4 CREAM TOPICAL at 17:26

## 2021-10-16 RX ADMIN — VALACYCLOVIR 500 MILLIGRAM(S): 500 TABLET, FILM COATED ORAL at 05:58

## 2021-10-16 RX ADMIN — MEROPENEM 100 MILLIGRAM(S): 1 INJECTION INTRAVENOUS at 17:26

## 2021-10-16 RX ADMIN — Medication 1 TABLET(S): at 11:01

## 2021-10-16 RX ADMIN — Medication 250 MILLIGRAM(S): at 11:02

## 2021-10-16 RX ADMIN — LIDOCAINE 1 PATCH: 4 CREAM TOPICAL at 06:07

## 2021-10-16 NOTE — PROGRESS NOTE ADULT - SUBJECTIVE AND OBJECTIVE BOX
[facility] [place] [facility]   MD LAN RIVERO MD RUORU WONG, PA    TEL:  OFFICE: [phone]  DR VILLASENOR CELL: [phone]  HERMELINDA COLEMAN CELL: [phone]  DR. CAMARA CELL: [phone]      FROM 5 PM - 7 AM PLEASE CALL ANSWERING SERVICE: [phone]    RENAL FOLLOW UP NOTE--Date of Service 10-16-21 @ 08:07  --------------------------------------------------------------------------------  HPI:      Pt seen and examined at bedside.   Denies SOB, chest pain     PAST HISTORY  --------------------------------------------------------------------------------  No significant changes to PMH, PSH, FHx, SHx, unless otherwise noted    ALLERGIES & MEDICATIONS  --------------------------------------------------------------------------------  Allergies    penicillin (Unknown)    Intolerances    Biaxin (Other)    Standing Inpatient Medications  amitriptyline 10 milliGRAM(s) Oral at bedtime  ascorbic acid 500 milliGRAM(s) Oral daily  heparin   Injectable 5000 Unit(s) SubCutaneous every 8 hours  influenza   Vaccine 0.5 milliLiter(s) IntraMuscular once  lidocaine   4% Patch 1 Patch Transdermal every 24 hours  LORazepam     Tablet 2 milliGRAM(s) Oral two times a day  meropenem  IVPB 1000 milliGRAM(s) IV Intermittent every 12 hours  multivitamin 1 Tablet(s) Oral daily  perphenazine 2 milliGRAM(s) Oral at bedtime  polyethylene glycol 3350 17 Gram(s) Oral daily  senna 2 Tablet(s) Oral at bedtime  sodium chloride 0.9%. 1000 milliLiter(s) IV Continuous <Continuous>  sodium chloride 0.9%. 1000 milliLiter(s) IV Continuous <Continuous>  sodium chloride 0.9%. 1000 milliLiter(s) IV Continuous <Continuous>  valACYclovir 500 milliGRAM(s) Oral two times a day  vancomycin  IVPB 1000 milliGRAM(s) IV Intermittent every 24 hours    PRN Inpatient Medications  acetaminophen   Tablet .. 650 milliGRAM(s) Oral every 6 hours PRN  acetaminophen   Tablet .. 650 milliGRAM(s) Oral every 6 hours PRN  albuterol/ipratropium for Nebulization 3 milliLiter(s) Nebulizer every 6 hours PRN      REVIEW OF SYSTEMS  --------------------------------------------------------------------------------  General: no fever,  MSK: no edema     VITALS/PHYSICAL EXAM  --------------------------------------------------------------------------------  T(C): 36.8 (10-16-21 @ 05:46), Max: 36.8 (10-16-21 @ 05:46)  HR: 64 (10-16-21 @ 05:46) (59 - 64)  BP: 136/74 (10-16-21 @ 05:46) (113/74 - 136/74)  RR: 18 (10-16-21 @ 05:46) (17 - 18)  SpO2: 97% (10-16-21 @ 05:46) (97% - 100%)  Wt(kg): --        10-15-21 @ 07:01  -  10-16-21 @ 07:00  --------------------------------------------------------  IN: 1300 mL / OUT: 1725 mL / NET: -425 mL      Physical Exam:  	Gen: NAD  	HEENT: MMM  	Pulm: CTA B/L  	CV: S1S2  	Abd: Soft, +BS  	Ext: No LE edema B/L                      Neuro: Awake   	Skin: Warm and Dry   	Vascular access: NO HD catheter            majano  LABS/STUDIES  --------------------------------------------------------------------------------              10.9   22.34 >-----------<  181      [10-16-21 @ 07:30]              33.2     134  |  101  |  32  ----------------------------<  93      [10-15-21 @ 07:27]  4.5   |  22  |  0.98        Ca     10.2     [10-15-21 @ 07:27]      iCa    1.48     [10-15 @ 07:31]    TPro  5.1  /  Alb  2.8  /  TBili  0.4  /  DBili  x   /  AST  71  /  ALT  25  /  AlkPhos  147  [10-15-21 @ 07:27]          Creatinine Trend:  SCr 0.98 [10-15 @ 07:27]  SCr 1.05 [10-14 @ 07:04]  SCr 1.40 [10-13 @ 07:22]  SCr 1.60 [10-12 @ 06:46]  SCr 1.55 [10-11 @ 07:42]    Urinalysis - [10-05-21 @ 17:05]      Color Yellow / Appearance Clear / SG 1.033 / pH 6.0      Gluc Negative / Ketone Negative  / Bili Negative / Urobili Negative       Blood Moderate / Protein 100 / Leuk Est Negative / Nitrite Negative      RBC 2 / WBC 3 / Hyaline 1 / Gran  / Sq Epi  / Non Sq Epi 2 / Bacteria Negative    Urine Creatinine 79      [10-10-21 @ 17:00]  Urine Urea Nitrogen 357      [10-10-21 @ 21:55]    PTH -- (Ca 10.4)      [10-14-21 @ 11:40]   12  PTH -- (Ca 12.2)      [10-11-21 @ 08:51]   8  Vitamin D (25OH) 65.1      [10-12-21 @ 08:57]  TSH 1.15      [10-05-21 @ 16:30]  Lipid: chol 87, , HDL 15, LDL --      [10-05-21 @ 16:30]      Free Light Chains: kappa 22.16, lambda 2.82, ratio = 7.86      [10-07 @ 17:52]

## 2021-10-16 NOTE — PROGRESS NOTE ADULT - ASSESSMENT
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   She was hospitalized at [facility] a few months ago for a cardiac evaluation, which was negative, per daughter    Admitted to [facility] with fever and weakness associated with 2 falls on 10/03/21.  She developed fever on 10/03 with accelerated weakness and brought to ER for evaluation.   Fever to 101.4 in the ER prompted CTX.  Etiology of fever not clear although "B" symptoms of lymphoma possible but her decline in functional status seemed acute.   Reported abdominal pain and bloating but CT scan without acute pathology.   UA with 14 WBCs, urine cx with Klebsiella - doubt UTI   Blood cx NGTD   Started on empiric Ceftriaxone  CT chest without PNA  WNV serology neg  Quant TB test neg  Repeat RVP still neg   Repeated urine cx neg & blood cx NGTD  Last fever was on 10/05/21   Fevers resolved, but remains weak & deconditioned, interaction remains limited  Empiric Ceftriaxone was stopped on 10/08/21    She was seen by derm on 10/07 for new onset of clean based bilateral buttock & lower sacral area ulcers - started on Valtrex & ulcers were swabbed for HSV & CMV by derm  Wound cx of these ulcers recovered E faecalis - ulcers do not appear to be infected & E faecalis represents colonizing winter   Viral swab is negative for HSV & VZV    Fever in evening of 10/11 to 102.8  She was started on vanco and meropenem-temps are down  She has failed swallow evaluation  CXR with no clear infiltrates, MRI of brain pending.  Heme Onc note appreciated, CLL and possible Ovarian  malignancy, GYN to decide on biopsy  Hypercalcemia being treated.  MRI now with dural thickening, appreciate neurology  input-hold off on LP as mental statis  improved/  Suggest:  1.Valtrex per derm  2.Vanco/meropenem empiric, will limit to 5 days if blood cultures  remain negative, possible small aspiration.(day 5/5)  3.? 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   She was hospitalized at [facility] a few months ago for a cardiac evaluation, which was negative, per daughter    Admitted to [facility] with fever and weakness associated with 2 falls on 10/03/21.  She developed fever on 10/03 with accelerated weakness and brought to ER for evaluation.   Fever to 101.4 in the ER prompted CTX.  Etiology of fever not clear although "B" symptoms of lymphoma possible but her decline in functional status seemed acute.   Reported abdominal pain and bloating but CT scan without acute pathology.   UA with 14 WBCs, urine cx with Klebsiella - doubt UTI   Blood cx NGTD   Started on empiric Ceftriaxone  CT chest without PNA  WNV serology neg  Quant TB test neg  Repeat RVP still neg   Repeated urine cx neg & blood cx NGTD  Last fever was on 10/05/21   Fevers resolved, but remains weak & deconditioned, interaction remains limited  Empiric Ceftriaxone was stopped on 10/08/21    She was seen by derm on 10/07 for new onset of clean based bilateral buttock & lower sacral area ulcers - started on Valtrex & ulcers were swabbed for HSV & CMV by derm  Wound cx of these ulcers recovered E faecalis - ulcers do not appear to be infected & E faecalis represents colonizing winter   Viral swab is negative for HSV & VZV    Fever in evening of 10/11 to 102.8  She was started on vanco and meropenem-temps are down  She has failed swallow evaluation  CXR with no clear infiltrates, MRI of brain pending.  Heme Onc note appreciated, CLL and possible Ovarian  malignancy, GYN to decide on biopsy  Hypercalcemia being treated.  MRI now with dural thickening, appreciate neurology  input-hold off on LP as mental statis  improved.  She appears to have improved with antibiotics-no fever, more alert, coherent.  It is not entirely clear what we are treating, perhaps a small aspiration  Suggest:  1.Valtrex per derm  2.Vanco/meropenem empiric, will limit to 5 days if blood cultures  remain negative, possible small aspiration.(day 5/5)  3.? Resolved toxic metabolic encephalopathy  4.Goals of care will be important  5.[place] guarded

## 2021-10-16 NOTE — PROGRESS NOTE ADULT - ASSESSMENT
Patient is a 80 PMHx of new onset CLL, with possible staging of ovarian cancer, HTN, HLD, CC s/p fall and fevers.  Per daughter, pt lives at home alone, fell two times while getting out of bed. States she fell onto both hands and knees while rolling out of bed. No LOC.  Denied any lightheadedness, dizziness at the time of fall.  Currently states she has some dizziness, like she would fall over if she had to walk. Was brought in today due to having a fever. PT complaining of SOB as well, and has pain in both knees. Denies cough, headache, chest pain, nausea, vomiting. Has not started any chemotherapy per daughter    #Leukocytosis with fever  -Patient is afebrile  -As per ID patient continue with Vancomycin and Meropenam for elevated WBC count, will monitor  -WBC count downtrending   -As per ID, may need lumbar puncture , likely outpatien tif recurrent MRI findings ater repeat imagin in 1-2 month   -culture results no growth to date   -Procalcitonin level noted   -MR brain completed 10/12, results noted   - passed S&S, oral feeding, aspiration precautions     # AMS and dizziness   CT head noted  fall precautions  orthostatics  Neurology eval appreciated follow up recs  -As per neuro brain MRI w/ and w/o IV contrast completed 10/12, results noted, as per Neurology, palliative care was consulted    - dural thickening noted on MRI. discussed with neurology. patient to repeat MRI in 1-2 month and if no improvement or recurrent findings, she may need LP to R/O leptomeningeal disease.   will discuss plan with patient's daughter     # Sepsis  R/O sepsis   UTI vs tumor fever vs other etiologies   Pan cx   Completed course of Rocephin, stopped on 10/08 as per ID  monitor clinically   ID eval appreciated   Hydration as tolerated  Pan CT noted      # HARJIT   HARJIT will monitor renal function  -Elevated calcium, as per renal Lasix is on hold due to likely dehydration, repeat BMP to monitor, and limit fluids to 50cc/hr for 1L, ionized calcium improving   Monitor renal function  I and Os   As per nephrology, monitor renal function and avoid nephrotoxic   -As per renal, monitor Na levels, free water restriction to 1L/day    #Hypercalcemia   -PTH level and Calcium levels noted. Will get endo evaluation   -As per heme/onc, IVF started BS 50cc/Hr or 24 hours  -Ionized calcium improving today. Will monitor       # Ovarian Ca, as per GYN/Onc there is no ovarian CA   likely due to her CLL   seen on CT   onc follow up   Patient follows with TRUNG, outpatient follow up     # HTN   No on an antihypertensive medication   Monitor BP      # HLD   lipid panel     #Wheezing, fluid overload likely   Cont Duonebs  will adjust diuresis, on hold for now   BNP results noted. Will follow  Echo ordered  Card eval appreciated     DVT and GI PPX

## 2021-10-16 NOTE — PROGRESS NOTE ADULT - ASSESSMENT
Patient is a 80 PMHx of new onset CLL, with possible staging of ovarian cancer, HTN, HLD, CC s/p fall and fevers.  Per daughter, pt lives at home alone, fell two times while getting out of bed. States she fell onto both hands and knees while rolling out of bed. No LOC.  Denied any lightheadedness, dizziness at the time of fall.  Was brought in today due to having a fever. PT complaining of SOB as well, and has pain in both knees. Denies cough, headache, chest pain, nausea, vomiting. Has not started any chemotherapy per daughter. Has renal failure, denied h/o renal failure      A/P:  HARJIT  Baseline Scr unclear  HARJIT possibly sec to contrast s/p 10/4   Renal function was improving but worsened and now improving again  FEUrea 30-suggested pre-renal state s/p 2L NS  Renal function stable  getting vanco-monitor vanco level  Monitor renal function at present  Monitor I/O  Avoid nephrotoxics, NSAIds RCA    Hypercalcemia:  PTH appropriately low  Vitamin D 1-025 elevated-- suggestive of granulomatous disease  Check ACE   s/p Pamidronate on 10/12 by Heme   Oncology follow up    Hyponatremia  Initial Urine lytes suggestive of dehydration   Repeat Urine lytes suggestive of SIADH  Monitor Na level daily   Free water restriction 1l/day                    change antibiotics in  NS instead of D5     HTN:  not on antihypertensive meds  monitor at present     Proteinuria/Hematuria  in setting of UTI  Repeat UA post tx   Ct A/P with no renal mass

## 2021-10-16 NOTE — PROGRESS NOTE ADULT - SUBJECTIVE AND OBJECTIVE BOX
CC: f/u for fever    Patient reports: she is alert and attentive, offers no complaints    REVIEW OF SYSTEMS:  All other review of systems negative (Comprehensive ROS)    Antimicrobials Day #  :day 5  meropenem  IVPB 1000 milliGRAM(s) IV Intermittent every 12 hours  valACYclovir 500 milliGRAM(s) Oral two times a day  vancomycin  IVPB 1000 milliGRAM(s) IV Intermittent every 24 hours    Other Medications Reviewed  MEDICATIONS  (STANDING):  amitriptyline 10 milliGRAM(s) Oral at bedtime  ascorbic acid 500 milliGRAM(s) Oral daily  heparin   Injectable 5000 Unit(s) SubCutaneous every 8 hours  influenza   Vaccine 0.5 milliLiter(s) IntraMuscular once  lidocaine   4% Patch 1 Patch Transdermal every 24 hours  LORazepam     Tablet 2 milliGRAM(s) Oral two times a day  meropenem  IVPB 1000 milliGRAM(s) IV Intermittent every 12 hours  multivitamin 1 Tablet(s) Oral daily  perphenazine 2 milliGRAM(s) Oral at bedtime  polyethylene glycol 3350 17 Gram(s) Oral daily  senna 2 Tablet(s) Oral at bedtime  sodium chloride 0.9%. 1000 milliLiter(s) (50 mL/Hr) IV Continuous <Continuous>  sodium chloride 0.9%. 1000 milliLiter(s) (50 mL/Hr) IV Continuous <Continuous>  sodium chloride 0.9%. 1000 milliLiter(s) (50 mL/Hr) IV Continuous <Continuous>  valACYclovir 500 milliGRAM(s) Oral two times a day  vancomycin  IVPB 1000 milliGRAM(s) IV Intermittent every 24 hours    T(F): 98.2 (10-16-21 @ 05:46), Max: 98.2 (10-16-21 @ 05:46)  HR: 60 (10-16-21 @ 01:06)  BP: 136/74 (10-16-21 @ 05:46)  RR: 18 (10-16-21 @ 05:46)  SpO2: 97% (10-16-21 @ 05:46)  Wt(kg): --    PHYSICAL EXAM:  General: alert, no acute distress  Eyes:  anicteric, no conjunctival injection, no discharge  Oropharynx: no lesions or injection 	  Neck: supple, without adenopathy  Lungs: clear to auscultation, decreased at bases  Heart: regular rate and rhythm; no murmur, rubs or gallops  Abdomen: soft, nondistended, nontender, without mass or organomegaly  Skin: no lesions  Extremities: no clubbing, cyanosis, trace edema  Neurologic: alert, oriented, moves all extremities    LAB RESULTS:                        10.3   22.33 )-----------( 178      ( 15 Oct 2021 07:23 )             30.7     10-15    134<L>  |  101  |  32<H>  ----------------------------<  93  4.5   |  22  |  0.98    Ca    10.2      15 Oct 2021 07:27    TPro  5.1<L>  /  Alb  2.8<L>  /  TBili  0.4  /  DBili  x   /  AST  71<H>  /  ALT  25  /  AlkPhos  147<H>  10-15    LIVER FUNCTIONS - ( 15 Oct 2021 07:27 )  Alb: 2.8 g/dL / Pro: 5.1 g/dL / ALK PHOS: 147 U/L / ALT: 25 U/L / AST: 71 U/L / GGT: x             MICROBIOLOGY:  RECENT CULTURES:  10-11 @ 18:36 .Blood Blood-Peripheral     No growth to date.      10-11 @ 18:25 Clean Catch Clean Catch (Midstream)     <10,000 CFU/mL Normal Urogenital Kaylyn          RADIOLOGY REVIEWED:  < from: Xray Cinesophagram Swallow Function w/ Contrast (10.14.21 @ 12:44) >  IMPRESSION:  Penetration with incomplete retrieval was seen. Mild aspiration was seen with physiologic cough response. A subsequent report will be issued by the department of speech and hearing.    --- End of Report ---    < end of copied text >

## 2021-10-16 NOTE — PROGRESS NOTE ADULT - ASSESSMENT
81yo F with  new onset CLL, with possible staging of ovarian cancer, HTN, HLD, RA, anxiety, s/p fall and fevers.   AMS likely 2/2 infection.  dizziness better + UTI  CTH and CT c spien unremarkable   CT C A P  LDL 44, A1c 5.2  b12 WNL  MRI brain w/ adn w/o shows dural enhancement   10/12 tmax 102.8 started vanco/toby, more lethargic   10/13 MRI as above   10/16 clinically improving   - MRI findings c/w possible neoplastic CNS process from CLL vs meningitis. never had LP.  clinically she is improving on vanco/toby for presumed aspiration PNA.  need to clarify GOC with family.  would consider holding off agressive workup with brain bx or LP until repeat imaging in 2-4weeks with and w/o for evolution and after GOC discussion especially given clinical improvement in mental status   - infectious workup on vanco/toby. pervsiously ctx for uti.   - heme/onc for CLL. recs appreciated   - HARJIT, IVF  - check orthostatics   - PT/OT/SS/SLP, OOBC  - check FS, glucose control <180  - GI/DVT ppx  - Counseling on diet, exercise, and medication adherence was done  - Counseling on smoking cessation and alcohol consumption offered when appropriate.  - Pain assessed and judicious use of narcotics when appropriate was discussed.    - Stroke education given when appropriate.  - Importance of fall prevention discussed.   - Differential diagnosis and plan of care discussed with patient and/or family and primary team  - Thank you for allowing me to participate in the care of this patient. Call with questions.   - d/c planning after infectious workup  RA Thompson MD  Vascular Neurology  Office: [phone]

## 2021-10-16 NOTE — PROGRESS NOTE ADULT - TIME BILLING
assessment/plan and coordinating care
assessment/plan and coordination of care
assessment/plan and coordinating care
assessment/plan and coordinating care
A/P
assessment/plan and coordinating care
assessment/plan and coordination of care
assessment/plan and coordination of care

## 2021-10-16 NOTE — PROGRESS NOTE ADULT - SUBJECTIVE AND OBJECTIVE BOX
Name of Patient : PREM KAY  MRN: [id number]  Date of visit: 10-16-21       Subjective: Patient seen and examined. No new events except as noted.   doing better  sitting down      MEDICATIONS:  MEDICATIONS  (STANDING):  amitriptyline 10 milliGRAM(s) Oral at bedtime  ascorbic acid 500 milliGRAM(s) Oral daily  heparin   Injectable 5000 Unit(s) SubCutaneous every 8 hours  influenza   Vaccine 0.5 milliLiter(s) IntraMuscular once  lidocaine   4% Patch 1 Patch Transdermal every 24 hours  LORazepam     Tablet 2 milliGRAM(s) Oral two times a day  meropenem  IVPB 1000 milliGRAM(s) IV Intermittent every 12 hours  multivitamin 1 Tablet(s) Oral daily  perphenazine 2 milliGRAM(s) Oral at bedtime  polyethylene glycol 3350 17 Gram(s) Oral daily  senna 2 Tablet(s) Oral at bedtime  sodium chloride 0.9%. 1000 milliLiter(s) (50 mL/Hr) IV Continuous <Continuous>  sodium chloride 0.9%. 1000 milliLiter(s) (50 mL/Hr) IV Continuous <Continuous>  sodium chloride 0.9%. 1000 milliLiter(s) (50 mL/Hr) IV Continuous <Continuous>  valACYclovir 500 milliGRAM(s) Oral two times a day  vancomycin  IVPB 1000 milliGRAM(s) IV Intermittent every 24 hours      PHYSICAL EXAM:  T(C): 36.5 (10-16-21 @ 17:02), Max: 36.8 (10-16-21 @ 05:46)  HR: 60 (10-16-21 @ 17:02) (60 - 64)  BP: 116/60 (10-16-21 @ 17:02) (112/70 - 136/74)  RR: 18 (10-16-21 @ 17:02) (17 - 18)  SpO2: 98% (10-16-21 @ 17:02) (97% - 100%)  Wt(kg): --  I&O's Summary    15 Oct 2021 07:01  -  16 Oct 2021 07:00  --------------------------------------------------------  IN: 1300 mL / OUT: 1725 mL / NET: -425 mL    16 Oct 2021 07:01  -  16 Oct 2021 18:33  --------------------------------------------------------  IN: 270 mL / OUT: 525 mL / NET: -255 mL          Appearance: calm, follows simple commands   HEENT:  PERRLA   Lymphatic: No lymphadenopathy   Cardiovascular: Normal S1 S2, no JVD  Respiratory: normal effort , clear  Gastrointestinal:  Soft, Non-tender  Skin: No rashes,  warm to touch  Psychiatry:  Mood & affect appropriate  Musculuskeletal: No edema      All labs, Imaging and EKGs personally reviewed   10-15-21 @ 07:01  -  10-16-21 @ 07:00  --------------------------------------------------------  IN: 1300 mL / OUT: 1725 mL / NET: -425 mL    10-16-21 @ 07:01  -  10-16-21 @ 18:33  --------------------------------------------------------  IN: 270 mL / OUT: 525 mL / NET: -255 mL                          10.9   22.34 )-----------( 181      ( 16 Oct 2021 07:30 )             33.2               10-16    136  |  104  |  22  ----------------------------<  88  4.5   |  24  |  0.84    Ca    9.7      16 Oct 2021 07:30    TPro  5.1<L>  /  Alb  2.8<L>  /  TBili  0.4  /  DBili  x   /  AST  71<H>  /  ALT  25  /  AlkPhos  147<H>  10-15

## 2021-10-16 NOTE — PROGRESS NOTE ADULT - SUBJECTIVE AND OBJECTIVE BOX
Neurology Progress Note    S: Patient seen and examined. resting in bed. doing okay. clinically a bit better     Medication:  MEDICATIONS  (STANDING):  amitriptyline 10 milliGRAM(s) Oral at bedtime  ascorbic acid 500 milliGRAM(s) Oral daily  heparin   Injectable 5000 Unit(s) SubCutaneous every 8 hours  influenza   Vaccine 0.5 milliLiter(s) IntraMuscular once  lidocaine   4% Patch 1 Patch Transdermal every 24 hours  LORazepam     Tablet 2 milliGRAM(s) Oral two times a day  meropenem  IVPB 1000 milliGRAM(s) IV Intermittent every 12 hours  multivitamin 1 Tablet(s) Oral daily  perphenazine 2 milliGRAM(s) Oral at bedtime  polyethylene glycol 3350 17 Gram(s) Oral daily  senna 2 Tablet(s) Oral at bedtime  sodium chloride 0.9%. 1000 milliLiter(s) (50 mL/Hr) IV Continuous <Continuous>  sodium chloride 0.9%. 1000 milliLiter(s) (50 mL/Hr) IV Continuous <Continuous>  sodium chloride 0.9%. 1000 milliLiter(s) (50 mL/Hr) IV Continuous <Continuous>  valACYclovir 500 milliGRAM(s) Oral two times a day  vancomycin  IVPB 1000 milliGRAM(s) IV Intermittent every 24 hours    MEDICATIONS  (PRN):  acetaminophen   Tablet .. 650 milliGRAM(s) Oral every 6 hours PRN Temp greater or equal to 38C (100.4F)  acetaminophen   Tablet .. 650 milliGRAM(s) Oral every 6 hours PRN Mild Pain (1 - 3), Moderate Pain (4 - 6)  albuterol/ipratropium for Nebulization 3 milliLiter(s) Nebulizer every 6 hours PRN Shortness of Breath and/or Wheezing        Vitals:  Vital Signs Last 24 Hrs  T(C): 36.8 (10-16-21 @ 05:46), Max: 36.8 (10-16-21 @ 05:46)  T(F): 98.2 (10-16-21 @ 05:46), Max: 98.2 (10-16-21 @ 05:46)  HR: 64 (10-16-21 @ 05:46) (59 - 64)  BP: 136/74 (10-16-21 @ 05:46) (113/74 - 136/74)  BP(mean): --  RR: 18 (10-16-21 @ 05:46) (17 - 18)  SpO2: 97% (10-16-21 @ 05:46) (97% - 100%)        General Exam:   General Appearance: Appropriately dressed and in no acute distress       Head: Normocephalic, atraumatic and no dysmorphic features  Ear, Nose, and Throat: Moist mucous membranes  CVS: S1S2+  Resp: No SOB, no wheeze or rhonchi  GI: soft NT/ND  Extremities: No edema or cyanosis  Skin: No bruises or rashes     Neurological Exam:  Mental Status: Awake, alert and oriented x 1-2.  Able to follow simple and complex verbal commands. Able to name and repeat. fluent speech. No obvious aphasia or dysarthria noted. masked facies. + frontal release sigbns  more leathargif   Cranial Nerves: PERRL, EOMI, VFFC, sensation V1-V3 intact,  L facial asymmetry, equal elevation of palate, scm/trap 5/5, tongue is midline on protrusion. no obvious papilledema on fundoscopic exam. hearing is grossly intact.   Motor:HASTINGS but + tremor L>R moves Uppers>lowers   Sensation: Intact to light touch and pinprick throughout. no right/left confusion. no extinction to tactile on DSS.   Reflexes: 1+ throughout at biceps, brachioradialis, triceps, patellars and ankles bilaterally and equal. No clonus. R toe and L toe were both downgoing.  Coordination: no dysmetria FNF  Gait: deferred   I personally reviewed the below data/images/labs:      CBC Full  -  ( 16 Oct 2021 07:30 )  WBC Count : 22.34 K/uL  RBC Count : 3.27 M/uL  Hemoglobin : 10.9 g/dL  Hematocrit : 33.2 %  Platelet Count - Automated : 181 K/uL  Mean Cell Volume : 101.5 fl  Mean Cell Hemoglobin : 33.3 pg  Mean Cell Hemoglobin Concentration : 32.8 gm/dL  Auto Neutrophil # : x  Auto Lymphocyte # : x  Auto Monocyte # : x  Auto Eosinophil # : x  Auto Basophil # : x  Auto Neutrophil % : x  Auto Lymphocyte % : x  Auto Monocyte % : x  Auto Eosinophil % : x  Auto Basophil % : x        10-16    136  |  104  |  22  ----------------------------<  88  4.5   |  24  |  0.84    Ca    9.7      16 Oct 2021 07:30    TPro  5.1<L>  /  Alb  2.8<L>  /  TBili  0.4  /  DBili  x   /  AST  71<H>  /  ALT  25  /  AlkPhos  147<H>  10-15          < from: CT Head No Cont (10.04.21 @ 09:07) >      EXAM:  CT CERVICAL SPINE                          EXAM:  CT BRAIN                                PROCEDURE DATE:  10/04/2021            INTERPRETATION:  CLINICAL STATEMENT: Trauma..    TECHNIQUE: CT of the head and cervical spine were performed withoutIV contrast. 3-D/MIP images obtained    COMPARISON: None.    FINDINGS:  Head:  There is mild diffuse parenchymal volume loss. There are areas of low attenuation in the periventricular white matter likely related to mild chronic microvascular ischemicchanges.    There is no acute intracranial hemorrhage, parenchymal mass, mass effect or midline shift. There is no acute territorial infarct. There is no hydrocephalus. Partial empty sella    The cranium is intact.    Mild mucosal thickening right maxillary sinus    Cervical spine:  Vertebral body heights intact. Straightening of the cervical lordosis. Grade 1 retrolisthesis C3 on C4. Grade 1 anterolisthesis of C5 on C6    There is no prevertebral soft tissue swelling. The odontoid is intact.    Evaluation of the spinal canal is limited on a CT exam.  Multilevel degenerative changes noted resulting in multilevel spinal canal stenosis and neural foraminal narrowing.    Partially visualized cervical lymphadenopathy noted with multiple enlarged lymph nodes. Calcifications noted within the thyroid gland.    IMPRESSION:  No acute intracranial hemorrhage.    No acute fracture cervical spine. If pain persists, follow-up MRI exam recommended    Partially visualized cervical lymphadenopathy suspicious for lymphoma or metastatic disease. Correlate clinically    --- End of Report ---                ROBI RUTH MD; Attending Radiologist  This document has been electronically signed. Oct  4 2021  9:22AM    < end of copied text >      < from: CT Chest No Cont (10.04.21 @ 20:17) >    EXAM:  CT CHEST                            PROCEDURE DATE:  10/04/2021            INTERPRETATION:  CLINICAL INFORMATION: Shortness of breath, evaluate for pneumonia    COMPARISON: CT abdomen and pelvis 10/4/2021    CONTRAST/COMPLICATIONS:  IV Contrast: None  Oral Contrast: None  Complications: No immediate complication    PROCEDURE:  CT of the Chest was performed.  Sagittal and coronal reformats were performed.    FINDINGS:    LUNGS AND AIRWAYS: Patent central airways.  Calcified nodule abutting the left anterior pleura (series 4, image 57). 3 mm nodule in the left upper lobe (series 4, image 39). Additional 2 mm nodule in the left upper lobe (series 4, image 58).  PLEURA: No pleural effusion.  MEDIASTINUM AND ERICA: Multiple enlarged supraclavicular lymph nodes. Additional axillary and mediastinal lymphadenopathy is seen.  VESSELS: Within normal limits.  HEART: Heart size is normal. No pericardial effusion.  CHEST WALL AND LOWER NECK: Calcifications within the bilateral thyroid lobes.  VISUALIZED UPPER ABDOMEN: Please refer to prior CT abdomen/pelvis.  BONES: Degenerative changes of the spine.    IMPRESSION:  Lymphadenoapthy described above is consistent with CLL as per patient history.    No CT evidence for pneumonia.                --- End of Report ---              KADEEM YE MD; Resident Radiology  This document has been electronically signed.  REYMUNDO VARGAS MD; Attending Radiologist  This document has been electronically signed. Oct  5 2021 12:09PM    < end of copied text >  < from: CT Abdomen and Pelvis w/ IV Cont (10.04.21 @ 09:30) >    EXAM:  CT ABDOMEN AND PELVIS IC                            PROCEDURE DATE:  10/04/2021            INTERPRETATION:  CLINICAL INFORMATION: Left lower quadrant pain    COMPARISON: None.    CONTRAST/COMPLICATIONS:  IV Contrast: Omnipaque 350  90 cc administered   10 cc discarded  Oral Contrast: None  Complications: None    PROCEDURE:  CT of the Abdomen and Pelvis was performed.  Sagittal and coronal reformats were performed.    FINDINGS:  LOWER CHEST: Bibasilar atelectasis. Partially imaged enlarged right axillary lymph nodes.    LIVER: Within normal limits.  BILE DUCTS: Normal caliber.  GALLBLADDER: Within normal limits.  SPLEEN: Within normal limits.  PANCREAS: Fatty atrophy of the pancreas without cutoff. No ductal dilatation or cutoff.  ADRENALS: Within normal limits.  KIDNEYS/URETERS: Within normal limits.    BLADDER/REPRODUCTIVE ORGANS: Multiloculated right adnexal cystic mass measuring 14.6 x 9.0 cm, which abuts the appendix. Left adnexal cyst measures 2.1 x 1.4 cm. Uterus is unremarkable.    BOWEL: No bowel obstruction. Appendix is normal. Colonic diverticulosis.  PERITONEUM: 2 small nodules are present adjacent to the cystic lesion measuring 4 and 6 mm in diameter, respectively (2:75 and 79), possibly peritoneal nodules or small pericolonic lymph nodes.  VESSELS: Within normal limits.  RETROPERITONEUM/LYMPH NODES: Enlarged bilateral inguinal, iliac, mesenteric, and retroperitoneal lymph nodes.  *  Reference left inguinal lymph node measures up to 4.0 x 2.5 cm (2:93).  *  Reference left periaortic lymph node measures up to 2.5 x 2.3 cm (2:45).  ABDOMINAL WALL: Within normal limits.  BONES: Degenerative changes. Lumbar dextroscoliosis.    IMPRESSION:  Enlarged bilateral inguinal, iliac, mesenteric, and retroperitoneal lymph nodes. Partially imaged enlarged right axillary lymph nodes. Primary consideration is lymphoma. Metastatic disease is not excluded. Dedicated chest imaging is recommended for full evaluation.    Cystic right adnexal mass measuring 14.6 x 9.0 cm, concerning for epithelial neoplasm.      --- End of Report ---      WALTER ALANIS MD; Resident Interventional Radiology  This document has been electronically signed.  DAREN MOHAN MD; Attending Radiologist  This document has been electronically signed. Oct  4 2021 11:14AM    < end of copied text >        < from: MR Head w/wo IV Cont (10.12.21 @ 22:23) >    EXAM:  MR BRAIN WAW IC                            PROCEDURE DATE:  10/12/2021            INTERPRETATION:  INDICATIONS:  Change in mental status. History of CLL with new ovarian mass.    TECHNIQUE:  Multiplanar imaging was performed using T1 weighted, T2 weighted and FLAIR sequences.  Diffusion weighted and susceptibility sensitive images were also obtained.  Following intravenous gadolinium, multiplanar T1 weighted images were performed. 7.5 cc Gadavist were administered. 0 cc were discarded.    COMPARISON EXAMINATION:  10/4/2021    FINDINGS: The study is degraded by motion artifact. Repeat sequences were performed.  VENTRICLES AND SULCI:  Prominent in size compatible with age-appropriate volume loss  INTRA-AXIAL:  Patchy areas of white matter T2 hyperintensity are seen compatible with mild microvascular-type changes. No mass effect or abnormal enhancement is seen. No diffusion restriction is seen to suggest acute ischemia.  EXTRA-AXIAL:  There is diffuse bilateral hemispheric mild dural thickening and enhancement demonstrating increased FLAIR signal and mild nodularity.  VISUALIZED SINUSES:  Normal.  VISUALIZED MASTOIDS:  Mild nonspecific soft tissue bilaterally  CALVARIUM:  Normal.  CAROTID FLOW VOIDS:  Present.  MISCELLANEOUS:  Left intraocular lens implant      IMPRESSION:  Mild diffuse dural thickening/enhancement along the hemispheric convexities. While this may be secondary to recent lumbar puncture, other inflammatory or neoplastic etiologies are not excluded. Continued follow-up is advised.    Otherwise no mass effect or acute cerebral ischemia.    --- End of Report ---                UGO SZYMANSKI MD; Attending Radiologist  This document has been electronically signed. Oct 13 2021 10:25AM    < end of copied text >

## 2021-10-17 LAB — SARS-COV-2 RNA SPEC QL NAA+PROBE: SIGNIFICANT CHANGE UP

## 2021-10-17 RX ADMIN — MEROPENEM 100 MILLIGRAM(S): 1 INJECTION INTRAVENOUS at 05:03

## 2021-10-17 RX ADMIN — Medication 500 MILLIGRAM(S): at 11:01

## 2021-10-17 RX ADMIN — HEPARIN SODIUM 5000 UNIT(S): 5000 INJECTION INTRAVENOUS; SUBCUTANEOUS at 14:15

## 2021-10-17 RX ADMIN — HEPARIN SODIUM 5000 UNIT(S): 5000 INJECTION INTRAVENOUS; SUBCUTANEOUS at 05:02

## 2021-10-17 RX ADMIN — PERPHENAZINE 2 MILLIGRAM(S): 8 TABLET, FILM COATED ORAL at 21:28

## 2021-10-17 RX ADMIN — LIDOCAINE 1 PATCH: 4 CREAM TOPICAL at 19:55

## 2021-10-17 RX ADMIN — Medication 650 MILLIGRAM(S): at 19:45

## 2021-10-17 RX ADMIN — Medication 2 MILLIGRAM(S): at 05:04

## 2021-10-17 RX ADMIN — LIDOCAINE 1 PATCH: 4 CREAM TOPICAL at 17:33

## 2021-10-17 RX ADMIN — Medication 1 TABLET(S): at 11:00

## 2021-10-17 RX ADMIN — Medication 2 MILLIGRAM(S): at 17:33

## 2021-10-17 RX ADMIN — Medication 650 MILLIGRAM(S): at 05:33

## 2021-10-17 RX ADMIN — LIDOCAINE 1 PATCH: 4 CREAM TOPICAL at 05:29

## 2021-10-17 RX ADMIN — Medication 650 MILLIGRAM(S): at 05:03

## 2021-10-17 RX ADMIN — HEPARIN SODIUM 5000 UNIT(S): 5000 INJECTION INTRAVENOUS; SUBCUTANEOUS at 21:28

## 2021-10-17 RX ADMIN — VALACYCLOVIR 500 MILLIGRAM(S): 500 TABLET, FILM COATED ORAL at 17:33

## 2021-10-17 RX ADMIN — Medication 10 MILLIGRAM(S): at 21:28

## 2021-10-17 RX ADMIN — VALACYCLOVIR 500 MILLIGRAM(S): 500 TABLET, FILM COATED ORAL at 05:03

## 2021-10-17 RX ADMIN — POLYETHYLENE GLYCOL 3350 17 GRAM(S): 17 POWDER, FOR SOLUTION ORAL at 11:01

## 2021-10-17 RX ADMIN — SENNA PLUS 2 TABLET(S): 8.6 TABLET ORAL at 21:27

## 2021-10-17 NOTE — PROGRESS NOTE ADULT - SUBJECTIVE AND OBJECTIVE BOX
Name of Patient : PREM KAY  MRN: [id number]  Date of visit: 10-17-21       Subjective: Patient seen and examined. No new events except as noted.   Doing okay     REVIEW OF SYSTEMS:    CONSTITUTIONAL: No weakness, fevers or chills  EYES/ENT: No visual changes;  No vertigo or throat pain   NECK: No pain or stiffness  RESPIRATORY: No cough, wheezing, hemoptysis; No shortness of breath  CARDIOVASCULAR: No chest pain or palpitations  GASTROINTESTINAL: No abdominal or epigastric pain.   GENITOURINARY: No dysuria, frequency or hematuria  NEUROLOGICAL: No numbness or weakness  SKIN: No itching, burning, rashes, or lesions   All other review of systems is negative unless indicated above.    MEDICATIONS:  MEDICATIONS  (STANDING):  amitriptyline 10 milliGRAM(s) Oral at bedtime  ascorbic acid 500 milliGRAM(s) Oral daily  heparin   Injectable 5000 Unit(s) SubCutaneous every 8 hours  influenza   Vaccine 0.5 milliLiter(s) IntraMuscular once  lidocaine   4% Patch 1 Patch Transdermal every 24 hours  LORazepam     Tablet 2 milliGRAM(s) Oral two times a day  multivitamin 1 Tablet(s) Oral daily  perphenazine 2 milliGRAM(s) Oral at bedtime  polyethylene glycol 3350 17 Gram(s) Oral daily  senna 2 Tablet(s) Oral at bedtime  valACYclovir 500 milliGRAM(s) Oral two times a day      PHYSICAL EXAM:  T(C): 36.7 (10-17-21 @ 20:51), Max: 37.1 (10-17-21 @ 05:00)  HR: 60 (10-17-21 @ 20:51) (56 - 69)  BP: 126/78 (10-17-21 @ 20:51) (102/55 - 130/76)  RR: 18 (10-17-21 @ 20:51) (16 - 18)  SpO2: 95% (10-17-21 @ 20:51) (94% - 98%)  Wt(kg): --  I&O's Summary    16 Oct 2021 07:01  -  17 Oct 2021 07:00  --------------------------------------------------------  IN: 630 mL / OUT: 2225 mL / NET: -1595 mL    17 Oct 2021 07:01  -  17 Oct 2021 21:54  --------------------------------------------------------  IN: 720 mL / OUT: 900 mL / NET: -180 mL          Appearance: Normal	  HEENT:  PERRLA   Lymphatic: No lymphadenopathy   Cardiovascular: Normal S1 S2, no JVD  Respiratory: normal effort , clear  Gastrointestinal:  Soft, Non-tender  Skin: No rashes,  warm to touch  Psychiatry:  Mood & affect appropriate  Musculuskeletal: No edema      All labs, Imaging and EKGs personally reviewed     10-16-21 @ 07:01  -  10-17-21 @ 07:00  --------------------------------------------------------  IN: 630 mL / OUT: 2225 mL / NET: -1595 mL    10-17-21 @ 07:01  -  10-17-21 @ 21:54  --------------------------------------------------------  IN: 720 mL / OUT: 900 mL / NET: -180 mL                          10.9   22.34 )-----------( 181      ( 16 Oct 2021 07:30 )             33.2               10-16    136  |  104  |  22  ----------------------------<  88  4.5   |  24  |  0.84    Ca    9.7      16 Oct 2021 07:30

## 2021-10-17 NOTE — PROGRESS NOTE ADULT - ASSESSMENT
Patient is a 80 PMHx of new onset CLL, with possible staging of ovarian cancer, HTN, HLD, CC s/p fall and fevers.  Per daughter, pt lives at home alone, fell two times while getting out of bed. States she fell onto both hands and knees while rolling out of bed. No LOC.  Denied any lightheadedness, dizziness at the time of fall.  Was brought in today due to having a fever. PT complaining of SOB as well, and has pain in both knees. Denies cough, headache, chest pain, nausea, vomiting. Has not started any chemotherapy per daughter. Has renal failure, denied h/o renal failure      A/P:  HARJIT  Baseline Scr unclear  HARJIT possibly sec to contrast s/p 10/4   Renal function stable  Monitor renal function at present  Monitor I/O  Avoid nephrotoxics, NSAIds RCA    Hypercalcemia:  PTH appropriately low  Vitamin D 1-025 elevated-- suggestive of granulomatous disease  Check ACE   s/p Pamidronate on 10/12 by Heme   Oncology follow up    Hyponatremia  Initial Urine lytes suggestive of dehydration   Repeat Urine lytes suggestive of SIADH  Monitor Na level daily   Free water restriction 1l/day                    change antibiotics in  NS instead of D5     HTN:  not on antihypertensive meds  monitor at present     Proteinuria/Hematuria  in setting of UTI  Repeat UA post tx   Ct A/P with no renal mass

## 2021-10-17 NOTE — PROGRESS NOTE ADULT - ASSESSMENT
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   She was hospitalized at [facility] a few months ago for a cardiac evaluation, which was negative, per daughter    Admitted to [facility] with fever and weakness associated with 2 falls on 10/03/21.  She developed fever on 10/03 with accelerated weakness and brought to ER for evaluation.   Fever to 101.4 in the ER prompted CTX.  Etiology of fever not clear although "B" symptoms of lymphoma possible but her decline in functional status seemed acute.   Reported abdominal pain and bloating but CT scan without acute pathology.   UA with 14 WBCs, urine cx with Klebsiella - doubt UTI   Blood cx NGTD   Started on empiric Ceftriaxone  CT chest without PNA  WNV serology neg  Quant TB test neg  Repeat RVP still neg   Repeated urine cx neg & blood cx NGTD  Last fever was on 10/05/21   Fevers resolved, but remains weak & deconditioned, interaction remains limited  Empiric Ceftriaxone was stopped on 10/08/21    She was seen by derm on 10/07 for new onset of clean based bilateral buttock & lower sacral area ulcers - started on Valtrex & ulcers were swabbed for HSV & CMV by derm  Wound cx of these ulcers recovered E faecalis - ulcers do not appear to be infected & E faecalis represents colonizing winter   Viral swab is negative for HSV & VZV    Fever in evening of 10/11 to 102.8  She was started on vanco and meropenem-temps are down  She has failed swallow evaluation  CXR with no clear infiltrates, MRI of brain pending.  Heme Onc note appreciated, CLL and possible Ovarian  malignancy, GYN to decide on biopsy  Hypercalcemia being treated.  MRI now with dural thickening, appreciate neurology  input-hold off on LP as mental statis  improved.  She appears to have improved with antibiotics-no fever, more alert, coherent.  It is not entirely clear what we are treating, perhaps a small aspiration  Suggest:  1.Valtrex per derm  2.Vanco/meropenem empiric, will limit to 5 days if blood cultures  remain negative, possible small aspiration, will d/c antibiotics  3.? Resolved toxic metabolic encephalopathy  4.Goals of care will be important  5. If she spikes another fever I would favor LP as pasrt of fever w/u

## 2021-10-17 NOTE — PROGRESS NOTE ADULT - SUBJECTIVE AND OBJECTIVE BOX
[facility]   MD LAN RIVERO MD RUORU WONG, PA    TEL:  OFFICE: [phone]  DR VILLASENOR CELL: [phone]  HERMELINDA COLEMAN CELL: [phone]  DR. CAMARA CELL: [phone]      FROM 5 PM - 7 AM PLEASE CALL ANSWERING SERVICE: [phone]    RENAL FOLLOW UP NOTE--Date of Service 10-17-21 @ 07:44  --------------------------------------------------------------------------------  HPI:      Pt seen and examined at bedside.       PAST HISTORY  --------------------------------------------------------------------------------  No significant changes to PMH, PSH, FHx, SHx, unless otherwise noted    ALLERGIES & MEDICATIONS  --------------------------------------------------------------------------------  Allergies    penicillin (Unknown)    Intolerances    Biaxin (Other)    Standing Inpatient Medications  amitriptyline 10 milliGRAM(s) Oral at bedtime  ascorbic acid 500 milliGRAM(s) Oral daily  heparin   Injectable 5000 Unit(s) SubCutaneous every 8 hours  influenza   Vaccine 0.5 milliLiter(s) IntraMuscular once  lidocaine   4% Patch 1 Patch Transdermal every 24 hours  LORazepam     Tablet 2 milliGRAM(s) Oral two times a day  multivitamin 1 Tablet(s) Oral daily  perphenazine 2 milliGRAM(s) Oral at bedtime  polyethylene glycol 3350 17 Gram(s) Oral daily  senna 2 Tablet(s) Oral at bedtime  sodium chloride 0.9%. 1000 milliLiter(s) IV Continuous <Continuous>  sodium chloride 0.9%. 1000 milliLiter(s) IV Continuous <Continuous>  sodium chloride 0.9%. 1000 milliLiter(s) IV Continuous <Continuous>  valACYclovir 500 milliGRAM(s) Oral two times a day    PRN Inpatient Medications  acetaminophen   Tablet .. 650 milliGRAM(s) Oral every 6 hours PRN  acetaminophen   Tablet .. 650 milliGRAM(s) Oral every 6 hours PRN  albuterol/ipratropium for Nebulization 3 milliLiter(s) Nebulizer every 6 hours PRN      REVIEW OF SYSTEMS  --------------------------------------------------------------------------------  General: no fever  MSK: no edema     VITALS/PHYSICAL EXAM  --------------------------------------------------------------------------------  T(C): 37.1 (10-17-21 @ 05:00), Max: 37.1 (10-17-21 @ 05:00)  HR: 69 (10-17-21 @ 05:00) (60 - 69)  BP: 129/58 (10-17-21 @ 05:00) (102/55 - 140/74)  RR: 18 (10-17-21 @ 05:00) (18 - 18)  SpO2: 98% (10-17-21 @ 05:00) (96% - 99%)  Wt(kg): --        10-16-21 @ 07:01  -  10-17-21 @ 07:00  --------------------------------------------------------  IN: 630 mL / OUT: 2225 mL / NET: -1595 mL      Physical Exam:  	Gen: NAD  	HEENT: MMM  	Pulm: CTA B/L  	CV: S1S2  	Abd: Soft, +BS  	Ext: No LE edema B/L                      Neuro: Awake   	Skin: Warm and Dry   	Vascular access: NO HD catheter            majano  LABS/STUDIES  --------------------------------------------------------------------------------              10.9   22.34 >-----------<  181      [10-16-21 @ 07:30]              33.2     136  |  104  |  22  ----------------------------<  88      [10-16-21 @ 07:30]  4.5   |  24  |  0.84        Ca     9.7     [10-16-21 @ 07:30]            Creatinine Trend:  SCr 0.84 [10-16 @ 07:30]  SCr 0.98 [10-15 @ 07:27]  SCr 1.05 [10-14 @ 07:04]  SCr 1.40 [10-13 @ 07:22]  SCr 1.60 [10-12 @ 06:46]    Urinalysis - [10-05-21 @ 17:05]      Color Yellow / Appearance Clear / SG 1.033 / pH 6.0      Gluc Negative / Ketone Negative  / Bili Negative / Urobili Negative       Blood Moderate / Protein 100 / Leuk Est Negative / Nitrite Negative      RBC 2 / WBC 3 / Hyaline 1 / Gran  / Sq Epi  / Non Sq Epi 2 / Bacteria Negative    Urine Creatinine 79      [10-10-21 @ 17:00]  Urine Urea Nitrogen 357      [10-10-21 @ 21:55]    PTH -- (Ca 10.4)      [10-14-21 @ 11:40]   12  PTH -- (Ca 12.2)      [10-11-21 @ 08:51]   8  Vitamin D (25OH) 65.1      [10-12-21 @ 08:57]  TSH 1.15      [10-05-21 @ 16:30]  Lipid: chol 87, , HDL 15, LDL --      [10-05-21 @ 16:30]      Free Light Chains: kappa 22.16, lambda 2.82, ratio = 7.86      [10-07 @ 17:52]

## 2021-10-17 NOTE — PROGRESS NOTE ADULT - ASSESSMENT
79yo F with  new onset CLL, with possible staging of ovarian cancer, HTN, HLD, RA, anxiety, s/p fall and fevers.   AMS likely 2/2 infection.  dizziness better + UTI  CTH and CT c spien unremarkable   CT C A P  LDL 44, A1c 5.2  b12 WNL  MRI brain w/ adn w/o shows dural enhancement   10/12 tmax 102.8 started vanco/toby, more lethargic   10/13 MRI as above   10/16 clinically improving   10/17 afebrile off abx   - MRI findings c/w possible neoplastic CNS process from CLL vs meningitis. never had LP.  clinically she is improving on vanco/toby for presumed aspiration PNA.  need to clarify GOC with family.  would consider holding off agressive workup with brain bx or LP until repeat imaging in 2-4weeks with and w/o for evolution and after GOC discussion especially given clinical improvement in mental status.   - infectious workup on vanco/toby. pervsiously ctx for uti. now completed.  will consider LP per ID if another fever   - heme/onc for CLL. recs appreciated   - HARJIT, IVF  - check orthostatics   - PT/OT/SS/SLP, OOBC  - check FS, glucose control <180  - GI/DVT ppx  - Counseling on diet, exercise, and medication adherence was done  - Counseling on smoking cessation and alcohol consumption offered when appropriate.  - Pain assessed and judicious use of narcotics when appropriate was discussed.    - Stroke education given when appropriate.  - Importance of fall prevention discussed.   - Differential diagnosis and plan of care discussed with patient and/or family and primary team  - Thank you for allowing me to participate in the care of this patient. Call with questions.   - d/c planning   RA Thompson MD  Vascular Neurology  Office: [phone]

## 2021-10-17 NOTE — PROGRESS NOTE ADULT - SUBJECTIVE AND OBJECTIVE BOX
CC: f/u for fever    Patient reports: she is comfortable on nasal cannula, more alert, tolerating diet.    REVIEW OF SYSTEMS:  All other review of systems negative (Comprehensive ROS)    Antimicrobials Day #  :day 6  meropenem  IVPB 1000 milliGRAM(s) IV Intermittent every 12 hours  valACYclovir 500 milliGRAM(s) Oral two times a day  vancomycin  IVPB 1000 milliGRAM(s) IV Intermittent every 24 hours    Other Medications Reviewed  MEDICATIONS  (STANDING):  amitriptyline 10 milliGRAM(s) Oral at bedtime  ascorbic acid 500 milliGRAM(s) Oral daily  heparin   Injectable 5000 Unit(s) SubCutaneous every 8 hours  influenza   Vaccine 0.5 milliLiter(s) IntraMuscular once  lidocaine   4% Patch 1 Patch Transdermal every 24 hours  LORazepam     Tablet 2 milliGRAM(s) Oral two times a day  meropenem  IVPB 1000 milliGRAM(s) IV Intermittent every 12 hours  multivitamin 1 Tablet(s) Oral daily  perphenazine 2 milliGRAM(s) Oral at bedtime  polyethylene glycol 3350 17 Gram(s) Oral daily  senna 2 Tablet(s) Oral at bedtime  sodium chloride 0.9%. 1000 milliLiter(s) (50 mL/Hr) IV Continuous <Continuous>  sodium chloride 0.9%. 1000 milliLiter(s) (50 mL/Hr) IV Continuous <Continuous>  sodium chloride 0.9%. 1000 milliLiter(s) (50 mL/Hr) IV Continuous <Continuous>  valACYclovir 500 milliGRAM(s) Oral two times a day  vancomycin  IVPB 1000 milliGRAM(s) IV Intermittent every 24 hours    T(F): 98.7 (10-17-21 @ 05:00), Max: 98.7 (10-17-21 @ 05:00)  HR: 69 (10-17-21 @ 05:00)  BP: 129/58 (10-17-21 @ 05:00)  RR: 18 (10-17-21 @ 05:00)  SpO2: 98% (10-17-21 @ 05:00)  Wt(kg): --    PHYSICAL EXAM:  General: alert, no acute distress  Eyes:  anicteric, no conjunctival injection, no discharge  Oropharynx: no lesions or injection 	  Neck: supple, without adenopathy  Lungs: few ronchi  Heart: regular rate and rhythm; no murmur, rubs or gallops  Abdomen: soft, nondistended, nontender, without mass or organomegaly  Skin: no lesions  Extremities: no clubbing, cyanosis,trace edema  Neurologic: alert, oriented, moves all extremities    LAB RESULTS:                        10.9   22.34 )-----------( 181      ( 16 Oct 2021 07:30 )             33.2     10-16    136  |  104  |  22  ----------------------------<  88  4.5   |  24  |  0.84    Ca    9.7      16 Oct 2021 07:30          MICROBIOLOGY:  RECENT CULTURES:      RADIOLOGY REVIEWED:    < from: MR Head w/wo IV Cont (10.12.21 @ 22:23) >    IMPRESSION:  Mild diffuse dural thickening/enhancement along the hemispheric convexities. While this may be secondary to recent lumbar puncture, other inflammatory or neoplastic etiologies are not excluded. Continued follow-up is advised.    Otherwise no mass effect or acute cerebral ischemia.    < end of copied text >

## 2021-10-17 NOTE — PROGRESS NOTE ADULT - SUBJECTIVE AND OBJECTIVE BOX
DATE OF SERVICE: 10-17-21 @ 21:47    Patient is a 80y old  Female who presents with a chief complaint of AMS (17 Oct 2021 09:34)      INTERVAL HISTORY: feels ok    PHYSICAL EXAM:  T(C): 36.7 (10-17-21 @ 20:51), Max: 37.1 (10-17-21 @ 05:00)  HR: 60 (10-17-21 @ 20:51) (56 - 69)  BP: 126/78 (10-17-21 @ 20:51) (102/55 - 130/76)  RR: 18 (10-17-21 @ 20:51) (16 - 18)  SpO2: 95% (10-17-21 @ 20:51) (94% - 98%)  Wt(kg): --  I&O's Summary    16 Oct 2021 07:01  -  17 Oct 2021 07:00  --------------------------------------------------------  IN: 630 mL / OUT: 2225 mL / NET: -1595 mL    17 Oct 2021 07:01  -  17 Oct 2021 21:47  --------------------------------------------------------  IN: 720 mL / OUT: 900 mL / NET: -180 mL          Appearance: In no distress	  HEENT:    PERRL, EOMI	  Cardiovascular:  S1 S2, No JVD  Respiratory: Lungs clear to auscultation	  Gastrointestinal:  Soft, Non-tender, + BS	  Vascularature:  No edema of LE  Psychiatric: Appropriate affect   Neuro: no acute focal deficits                               10.9   22.34 )-----------( 181      ( 16 Oct 2021 07:30 )             33.2     10-16    136  |  104  |  22  ----------------------------<  88  4.5   |  24  |  0.84    Ca    9.7      16 Oct 2021 07:30          Labs personally reviewed      ASSESSMENT/PLAN: 	      Patient is a 80 PMHx of new onset CLL, with possible staging of ovarian cancer, HTN, HLD, CC s/p fall and fevers.  Per daughter, pt lives at home alone, fell two times while getting out of bed. States she fell onto both hands and knees while rolling out of bed. No LOC.  Denied any lightheadedness, dizziness at the time of fall.  Currently states she has some dizziness, like she would fall over if she had to walk. Was brought in today due to having a fever. PT complaining of SOB as well, and has pain in both knees. Denies cough, headache, chest pain, nausea, vomiting. Has not started any chemotherapy per daughter    # AMS and dizziness   Neurology eval recommends MRI of brain which was negative for mass or cerebral ischemia as noted above   Unlikely cardiac in nature  barrium swallow eval done 10/14 was started on diet    palliative eval appreciated   pt febrile     # SOB  d/pablito IVF as CXR with mild pulm vasc congestion  - Recurrent SOB again this am  - Lasix 40mg IV daily on hold   - close monitor Cr given recent HARJIT    # HARJIT   per nephro:  Baseline Scr unclear  HARJIT possibly sec to contrast s/p 10/4   Has high ca as well, possibly dehydrated  Monitor renal function at present  Avoid nephrotoxics, NSAID's RCA    # HTN   continue BP meds  adjust as tolerated   BP stable     # HLD   lipid panel normal except HDL 15      Dallas Monaco DO [facility]  Cardiovascular Medicine  [address], [address]  Office: [phone]  Cell: [phone]

## 2021-10-18 ENCOUNTER — APPOINTMENT (OUTPATIENT)
Dept: HEMATOLOGY ONCOLOGY | Facility: CLINIC | Age: 81
End: 2021-10-18

## 2021-10-18 LAB
ALBUMIN SERPL ELPH-MCNC: 3 G/DL — LOW (ref 3.3–5)
ALP SERPL-CCNC: 130 U/L — HIGH (ref 40–120)
ALT FLD-CCNC: 27 U/L — SIGNIFICANT CHANGE UP (ref 10–45)
ANION GAP SERPL CALC-SCNC: 11 MMOL/L — SIGNIFICANT CHANGE UP (ref 5–17)
AST SERPL-CCNC: 54 U/L — HIGH (ref 10–40)
BASOPHILS # BLD AUTO: 0.19 K/UL — SIGNIFICANT CHANGE UP (ref 0–0.2)
BASOPHILS NFR BLD AUTO: 0.9 % — SIGNIFICANT CHANGE UP (ref 0–2)
BILIRUB SERPL-MCNC: 0.3 MG/DL — SIGNIFICANT CHANGE UP (ref 0.2–1.2)
BUN SERPL-MCNC: 15 MG/DL — SIGNIFICANT CHANGE UP (ref 7–23)
CALCIUM SERPL-MCNC: 9.5 MG/DL — SIGNIFICANT CHANGE UP (ref 8.4–10.5)
CHLORIDE SERPL-SCNC: 103 MMOL/L — SIGNIFICANT CHANGE UP (ref 96–108)
CO2 SERPL-SCNC: 24 MMOL/L — SIGNIFICANT CHANGE UP (ref 22–31)
CREAT SERPL-MCNC: 0.77 MG/DL — SIGNIFICANT CHANGE UP (ref 0.5–1.3)
EOSINOPHIL # BLD AUTO: 0.74 K/UL — HIGH (ref 0–0.5)
EOSINOPHIL NFR BLD AUTO: 3.6 % — SIGNIFICANT CHANGE UP (ref 0–6)
GLUCOSE SERPL-MCNC: 109 MG/DL — HIGH (ref 70–99)
HCT VFR BLD CALC: 34.4 % — LOW (ref 34.5–45)
HGB BLD-MCNC: 11.1 G/DL — LOW (ref 11.5–15.5)
LYMPHOCYTES # BLD AUTO: 15.81 K/UL — HIGH (ref 1–3.3)
LYMPHOCYTES # BLD AUTO: 76.8 % — HIGH (ref 13–44)
MCHC RBC-ENTMCNC: 32.3 GM/DL — SIGNIFICANT CHANGE UP (ref 32–36)
MCHC RBC-ENTMCNC: 33.3 PG — SIGNIFICANT CHANGE UP (ref 27–34)
MCV RBC AUTO: 103.3 FL — HIGH (ref 80–100)
MONOCYTES # BLD AUTO: 1.46 K/UL — HIGH (ref 0–0.9)
MONOCYTES NFR BLD AUTO: 7.1 % — SIGNIFICANT CHANGE UP (ref 2–14)
NEUTROPHILS # BLD AUTO: 2.39 K/UL — SIGNIFICANT CHANGE UP (ref 1.8–7.4)
NEUTROPHILS NFR BLD AUTO: 11.6 % — LOW (ref 43–77)
PLATELET # BLD AUTO: 245 K/UL — SIGNIFICANT CHANGE UP (ref 150–400)
POTASSIUM SERPL-MCNC: 4 MMOL/L — SIGNIFICANT CHANGE UP (ref 3.5–5.3)
POTASSIUM SERPL-SCNC: 4 MMOL/L — SIGNIFICANT CHANGE UP (ref 3.5–5.3)
PROT SERPL-MCNC: 5.2 G/DL — LOW (ref 6–8.3)
RBC # BLD: 3.33 M/UL — LOW (ref 3.8–5.2)
RBC # FLD: 22.3 % — HIGH (ref 10.3–14.5)
SODIUM SERPL-SCNC: 138 MMOL/L — SIGNIFICANT CHANGE UP (ref 135–145)
WBC # BLD: 20.59 K/UL — HIGH (ref 3.8–10.5)
WBC # FLD AUTO: 20.59 K/UL — HIGH (ref 3.8–10.5)

## 2021-10-18 RX ADMIN — POLYETHYLENE GLYCOL 3350 17 GRAM(S): 17 POWDER, FOR SOLUTION ORAL at 12:30

## 2021-10-18 RX ADMIN — Medication 1 TABLET(S): at 12:37

## 2021-10-18 RX ADMIN — HEPARIN SODIUM 5000 UNIT(S): 5000 INJECTION INTRAVENOUS; SUBCUTANEOUS at 14:56

## 2021-10-18 RX ADMIN — Medication 3 MILLILITER(S): at 06:31

## 2021-10-18 RX ADMIN — VALACYCLOVIR 500 MILLIGRAM(S): 500 TABLET, FILM COATED ORAL at 06:31

## 2021-10-18 RX ADMIN — Medication 10 MILLIGRAM(S): at 21:41

## 2021-10-18 RX ADMIN — SENNA PLUS 2 TABLET(S): 8.6 TABLET ORAL at 21:42

## 2021-10-18 RX ADMIN — Medication 500 MILLIGRAM(S): at 12:37

## 2021-10-18 RX ADMIN — HEPARIN SODIUM 5000 UNIT(S): 5000 INJECTION INTRAVENOUS; SUBCUTANEOUS at 21:42

## 2021-10-18 RX ADMIN — VALACYCLOVIR 500 MILLIGRAM(S): 500 TABLET, FILM COATED ORAL at 18:13

## 2021-10-18 RX ADMIN — LIDOCAINE 1 PATCH: 4 CREAM TOPICAL at 19:47

## 2021-10-18 RX ADMIN — PERPHENAZINE 2 MILLIGRAM(S): 8 TABLET, FILM COATED ORAL at 21:41

## 2021-10-18 RX ADMIN — HEPARIN SODIUM 5000 UNIT(S): 5000 INJECTION INTRAVENOUS; SUBCUTANEOUS at 06:30

## 2021-10-18 RX ADMIN — LIDOCAINE 1 PATCH: 4 CREAM TOPICAL at 05:41

## 2021-10-18 RX ADMIN — Medication 2 MILLIGRAM(S): at 06:30

## 2021-10-18 RX ADMIN — Medication 650 MILLIGRAM(S): at 14:56

## 2021-10-18 RX ADMIN — Medication 2 MILLIGRAM(S): at 18:13

## 2021-10-18 RX ADMIN — Medication 650 MILLIGRAM(S): at 15:40

## 2021-10-18 NOTE — PROGRESS NOTE ADULT - SUBJECTIVE AND OBJECTIVE BOX
Date of Service   10-18-21 @ 13:34    Patient is a 81y old  Female who presents with a chief complaint of fever (18 Oct 2021 11:51)      INTERVAL HISTORY:     TELEMETRY Personally reviewed:    REVIEW OF SYSTEMS:   CONSTITUTIONAL: No weakness  EYES/ENT: No visual changes; No throat pain  Neck: No pain or stiffness  Respiratory: No cough, wheezing, No shortness of breath  CARDIOVASCULAR: no chest pain or palpitations  GASTROINTESTINAL: No abdominal pain, no nausea, vomiting or hematemesis  GENITOURINARY: No dysuria, frequency or hematuria  NEUROLOGICAL: No stroke like symptoms  SKIN: No rashes    	  MEDICATIONS:        PHYSICAL EXAM:  T(C): 36.4 (10-18-21 @ 09:43), Max: 36.7 (10-17-21 @ 17:27)  HR: 71 (10-18-21 @ 09:43) (56 - 71)  BP: 130/74 (10-18-21 @ 09:43) (124/56 - 136/85)  RR: 18 (10-18-21 @ 09:43) (17 - 18)  SpO2: 97% (10-18-21 @ 09:43) (94% - 98%)  Wt(kg): --  I&O's Summary    17 Oct 2021 07:01  -  18 Oct 2021 07:00  --------------------------------------------------------  IN: 720 mL / OUT: 2250 mL / NET: -1530 mL    18 Oct 2021 07:01  -  18 Oct 2021 13:34  --------------------------------------------------------  IN: 460 mL / OUT: 250 mL / NET: 210 mL          Appearance: In no distress	  HEENT:    PERRL, EOMI	  Cardiovascular:  S1 S2, No JVD  Respiratory: Lungs clear to auscultation	  Gastrointestinal:  Soft, Non-tender, + BS	  Vascularature:  No edema of LE  Psychiatric: Appropriate affect   Neuro: no acute focal deficits                               11.1   20.59 )-----------( 245      ( 18 Oct 2021 11:13 )             34.4     10-18    138  |  103  |  15  ----------------------------<  109<H>  4.0   |  24  |  0.77    Ca    9.5      18 Oct 2021 11:13    TPro  5.2<L>  /  Alb  3.0<L>  /  TBili  0.3  /  DBili  x   /  AST  54<H>  /  ALT  27  /  AlkPhos  130<H>  10-18        Labs personally reviewed      ASSESSMENT/PLAN: 	          Jones Kern FNP-BC   Dallas Monaco DO [facility]  Cardiovascular Medicine  [address] [place] [address], [facility] [address]  Office: [phone]  Cell: [phone] Date of Service   10-18-21 @ 13:34    Patient is a 81y old  Female who presents with a chief complaint of fever (18 Oct 2021 11:51)      INTERVAL HISTORY: pt feels ok     REVIEW OF SYSTEMS:   CONSTITUTIONAL: No weakness  EYES/ENT: No visual changes; No throat pain  Neck: No pain or stiffness  Respiratory: No cough, wheezing, No shortness of breath  CARDIOVASCULAR: no chest pain or palpitations  GASTROINTESTINAL: No abdominal pain, no nausea, vomiting or hematemesis  GENITOURINARY: No dysuria, frequency or hematuria  NEUROLOGICAL: No stroke like symptoms  SKIN: No rashes    	  MEDICATIONS:        PHYSICAL EXAM:  T(C): 36.4 (10-18-21 @ 09:43), Max: 36.7 (10-17-21 @ 17:27)  HR: 71 (10-18-21 @ 09:43) (56 - 71)  BP: 130/74 (10-18-21 @ 09:43) (124/56 - 136/85)  RR: 18 (10-18-21 @ 09:43) (17 - 18)  SpO2: 97% (10-18-21 @ 09:43) (94% - 98%)  Wt(kg): --  I&O's Summary    17 Oct 2021 07:01  -  18 Oct 2021 07:00  --------------------------------------------------------  IN: 720 mL / OUT: 2250 mL / NET: -1530 mL    18 Oct 2021 07:01  -  18 Oct 2021 13:34  --------------------------------------------------------  IN: 460 mL / OUT: 250 mL / NET: 210 mL          Appearance: In no distress	  HEENT:    PERRL, EOMI	  Cardiovascular:  S1 S2, No JVD  Respiratory: Lungs clear to auscultation	  Gastrointestinal:  Soft, Non-tender, + BS	  Vascularature:  No edema of LE  Psychiatric: Appropriate affect   Neuro: no acute focal deficits                               11.1   20.59 )-----------( 245      ( 18 Oct 2021 11:13 )             34.4     10-18    138  |  103  |  15  ----------------------------<  109<H>  4.0   |  24  |  0.77    Ca    9.5      18 Oct 2021 11:13    TPro  5.2<L>  /  Alb  3.0<L>  /  TBili  0.3  /  DBili  x   /  AST  54<H>  /  ALT  27  /  AlkPhos  130<H>  10-18        Labs personally reviewed      ASSESSMENT/PLAN: 	        Patient is a 80 PMHx of new onset CLL, with possible staging of ovarian cancer, HTN, HLD, CC s/p fall and fevers.  Per daughter, pt lives at home alone, fell two times while getting out of bed. States she fell onto both hands and knees while rolling out of bed. No LOC.  Denied any lightheadedness, dizziness at the time of fall.  Currently states she has some dizziness, like she would fall over if she had to walk. Was brought in today due to having a fever. PT complaining of SOB as well, and has pain in both knees. Denies cough, headache, chest pain, nausea, vomiting. Has not started any chemotherapy per daughter    # AMS and dizziness   Neurology eval recommends MRI of brain which was negative for mass or cerebral ischemia as noted above   Unlikely cardiac in nature  barrium swallow eval done 10/14 was started on diet    palliative eval appreciated   pt febrile   ID following     # SOB  d/pablito IVF as CXR with mild pulm vasc congestion  - Lasix 40mg IV daily on hold   - close monitor Cr given recent HARJIT    # HARJIT   per nephro:  Baseline Scr unclear  HARJIT possibly sec to contrast s/p 10/4   Has high ca as well, possibly dehydrated  Monitor renal function at present  Avoid nephrotoxics, NSAID's RCA    # HTN   continue BP meds  adjust as tolerated   BP stable     # HLD   lipid panel normal except HDL 15        Jones MARQUEZ-JOSESITO Monaco DO [facility]  Cardiovascular Medicine  [address] [facility], [address]  Office: [phone]  Cell: [phone]

## 2021-10-18 NOTE — PROGRESS NOTE ADULT - SUBJECTIVE AND OBJECTIVE BOX
Name of Patient : PREM KAY  MRN: [id number]  Date of visit: 10-18-21 @ 13:50      Subjective: Patient seen and examined. No new events except as noted.   stable  d/c planning     REVIEW OF SYSTEMS:    CONSTITUTIONAL: No weakness, fevers or chills  EYES/ENT: No visual changes;  No vertigo or throat pain   NECK: No pain or stiffness  RESPIRATORY: No cough, wheezing, hemoptysis; No shortness of breath  CARDIOVASCULAR: No chest pain or palpitations  GASTROINTESTINAL: No abdominal or epigastric pain. No nausea, vomiting, or hematemesis; No diarrhea or constipation. No melena or hematochezia.  GENITOURINARY: No dysuria, frequency or hematuria  NEUROLOGICAL: No numbness or weakness  SKIN: No itching, burning, rashes, or lesions   All other review of systems is negative unless indicated above.    MEDICATIONS:  MEDICATIONS  (STANDING):  amitriptyline 10 milliGRAM(s) Oral at bedtime  ascorbic acid 500 milliGRAM(s) Oral daily  heparin   Injectable 5000 Unit(s) SubCutaneous every 8 hours  influenza   Vaccine 0.5 milliLiter(s) IntraMuscular once  lidocaine   4% Patch 1 Patch Transdermal every 24 hours  LORazepam     Tablet 2 milliGRAM(s) Oral two times a day  multivitamin 1 Tablet(s) Oral daily  perphenazine 2 milliGRAM(s) Oral at bedtime  polyethylene glycol 3350 17 Gram(s) Oral daily  senna 2 Tablet(s) Oral at bedtime  valACYclovir 500 milliGRAM(s) Oral two times a day      PHYSICAL EXAM:  T(C): 36.6 (10-18-21 @ 13:48), Max: 36.7 (10-17-21 @ 17:27)  HR: 71 (10-18-21 @ 13:48) (56 - 71)  BP: 125/78 (10-18-21 @ 13:48) (124/56 - 136/85)  RR: 16 (10-18-21 @ 13:48) (16 - 18)  SpO2: 97% (10-18-21 @ 13:48) (94% - 98%)  Wt(kg): --  I&O's Summary    17 Oct 2021 07:01  -  18 Oct 2021 07:00  --------------------------------------------------------  IN: 720 mL / OUT: 2250 mL / NET: -1530 mL    18 Oct 2021 07:01  -  18 Oct 2021 13:50  --------------------------------------------------------  IN: 460 mL / OUT: 250 mL / NET: 210 mL          Appearance: Normal	  HEENT:  PERRLA   Lymphatic: No lymphadenopathy   Cardiovascular: Normal S1 S2, no JVD  Respiratory: normal effort , clear  Gastrointestinal:  Soft, Non-tender  Skin: No rashes,  warm to touch  Psychiatry:  Mood & affect appropriate  Musculuskeletal: No edema      All labs, Imaging and EKGs personally reviewed       10-17-21 @ 07:01  -  10-18-21 @ 07:00  --------------------------------------------------------  IN: 720 mL / OUT: 2250 mL / NET: -1530 mL    10-18-21 @ 07:01  -  10-18-21 @ 13:50  --------------------------------------------------------  IN: 460 mL / OUT: 250 mL / NET: 210 mL                            11.1   20.59 )-----------( 245      ( 18 Oct 2021 11:13 )             34.4               10-18    138  |  103  |  15  ----------------------------<  109<H>  4.0   |  24  |  0.77    Ca    9.5      18 Oct 2021 11:13    TPro  5.2<L>  /  Alb  3.0<L>  /  TBili  0.3  /  DBili  x   /  AST  54<H>  /  ALT  27  /  AlkPhos  130<H>  10-18

## 2021-10-18 NOTE — PROGRESS NOTE ADULT - SUBJECTIVE AND OBJECTIVE BOX
Neurology Progress Note    S: Patient seen and examined. resting in bed. doing okay. clinically a bit better now off abx afebrile      Medication:  MEDICATIONS  (STANDING):  amitriptyline 10 milliGRAM(s) Oral at bedtime  ascorbic acid 500 milliGRAM(s) Oral daily  heparin   Injectable 5000 Unit(s) SubCutaneous every 8 hours  influenza   Vaccine 0.5 milliLiter(s) IntraMuscular once  lidocaine   4% Patch 1 Patch Transdermal every 24 hours  LORazepam     Tablet 2 milliGRAM(s) Oral two times a day  multivitamin 1 Tablet(s) Oral daily  perphenazine 2 milliGRAM(s) Oral at bedtime  polyethylene glycol 3350 17 Gram(s) Oral daily  senna 2 Tablet(s) Oral at bedtime  valACYclovir 500 milliGRAM(s) Oral two times a day    MEDICATIONS  (PRN):  acetaminophen   Tablet .. 650 milliGRAM(s) Oral every 6 hours PRN Temp greater or equal to 38C (100.4F)  acetaminophen   Tablet .. 650 milliGRAM(s) Oral every 6 hours PRN Mild Pain (1 - 3), Moderate Pain (4 - 6)  albuterol/ipratropium for Nebulization 3 milliLiter(s) Nebulizer every 6 hours PRN Shortness of Breath and/or Wheezing      Vitals:  Vital Signs Last 24 Hrs  T(C): 36.7 (10-18-21 @ 17:04), Max: 36.7 (10-17-21 @ 20:51)  T(F): 98 (10-18-21 @ 17:04), Max: 98 (10-17-21 @ 20:51)  HR: 71 (10-18-21 @ 17:04) (60 - 71)  BP: 126/81 (10-18-21 @ 17:04) (125/78 - 136/85)  BP(mean): --  RR: 17 (10-18-21 @ 17:04) (16 - 18)  SpO2: 99% (10-18-21 @ 17:04) (95% - 99%)          General Exam:   General Appearance: Appropriately dressed and in no acute distress       Head: Normocephalic, atraumatic and no dysmorphic features  Ear, Nose, and Throat: Moist mucous membranes  CVS: S1S2+  Resp: No SOB, no wheeze or rhonchi  GI: soft NT/ND  Extremities: No edema or cyanosis  Skin: No bruises or rashes     Neurological Exam:  Mental Status: Awake, alert and oriented x 1-2.  Able to follow simple and complex verbal commands. Able to name and repeat. fluent speech. No obvious aphasia or dysarthria noted. masked facies. + frontal release sigbns  more leathargif   Cranial Nerves: PERRL, EOMI, VFFC, sensation V1-V3 intact,  L facial asymmetry, equal elevation of palate, scm/trap 5/5, tongue is midline on protrusion. no obvious papilledema on fundoscopic exam. hearing is grossly intact.   Motor:HASTINGS but + tremor L>R moves Uppers>lowers   Sensation: Intact to light touch and pinprick throughout. no right/left confusion. no extinction to tactile on DSS.   Reflexes: 1+ throughout at biceps, brachioradialis, triceps, patellars and ankles bilaterally and equal. No clonus. R toe and L toe were both downgoing.  Coordination: no dysmetria FNF  Gait: deferred   I personally reviewed the below data/images/labs:    CBC Full  -  ( 18 Oct 2021 11:13 )  WBC Count : 20.59 K/uL  RBC Count : 3.33 M/uL  Hemoglobin : 11.1 g/dL  Hematocrit : 34.4 %  Platelet Count - Automated : 245 K/uL  Mean Cell Volume : 103.3 fl  Mean Cell Hemoglobin : 33.3 pg  Mean Cell Hemoglobin Concentration : 32.3 gm/dL  Auto Neutrophil # : 2.39 K/uL  Auto Lymphocyte # : 15.81 K/uL  Auto Monocyte # : 1.46 K/uL  Auto Eosinophil # : 0.74 K/uL  Auto Basophil # : 0.19 K/uL  Auto Neutrophil % : 11.6 %  Auto Lymphocyte % : 76.8 %  Auto Monocyte % : 7.1 %  Auto Eosinophil % : 3.6 %  Auto Basophil % : 0.9 %    10-18    138  |  103  |  15  ----------------------------<  109<H>  4.0   |  24  |  0.77    Ca    9.5      18 Oct 2021 11:13    TPro  5.2<L>  /  Alb  3.0<L>  /  TBili  0.3  /  DBili  x   /  AST  54<H>  /  ALT  27  /  AlkPhos  130<H>  10-18        < from: CT Head No Cont (10.04.21 @ 09:07) >      EXAM:  CT CERVICAL SPINE                          EXAM:  CT BRAIN                                PROCEDURE DATE:  10/04/2021            INTERPRETATION:  CLINICAL STATEMENT: Trauma..    TECHNIQUE: CT of the head and cervical spine were performed withoutIV contrast. 3-D/MIP images obtained    COMPARISON: None.    FINDINGS:  Head:  There is mild diffuse parenchymal volume loss. There are areas of low attenuation in the periventricular white matter likely related to mild chronic microvascular ischemicchanges.    There is no acute intracranial hemorrhage, parenchymal mass, mass effect or midline shift. There is no acute territorial infarct. There is no hydrocephalus. Partial empty sella    The cranium is intact.    Mild mucosal thickening right maxillary sinus    Cervical spine:  Vertebral body heights intact. Straightening of the cervical lordosis. Grade 1 retrolisthesis C3 on C4. Grade 1 anterolisthesis of C5 on C6    There is no prevertebral soft tissue swelling. The odontoid is intact.    Evaluation of the spinal canal is limited on a CT exam.  Multilevel degenerative changes noted resulting in multilevel spinal canal stenosis and neural foraminal narrowing.    Partially visualized cervical lymphadenopathy noted with multiple enlarged lymph nodes. Calcifications noted within the thyroid gland.    IMPRESSION:  No acute intracranial hemorrhage.    No acute fracture cervical spine. If pain persists, follow-up MRI exam recommended    Partially visualized cervical lymphadenopathy suspicious for lymphoma or metastatic disease. Correlate clinically    --- End of Report ---                ROBI RUTH MD; Attending Radiologist  This document has been electronically signed. Oct  4 2021  9:22AM    < end of copied text >      < from: CT Chest No Cont (10.04.21 @ 20:17) >    EXAM:  CT CHEST                            PROCEDURE DATE:  10/04/2021            INTERPRETATION:  CLINICAL INFORMATION: Shortness of breath, evaluate for pneumonia    COMPARISON: CT abdomen and pelvis 10/4/2021    CONTRAST/COMPLICATIONS:  IV Contrast: None  Oral Contrast: None  Complications: No immediate complication    PROCEDURE:  CT of the Chest was performed.  Sagittal and coronal reformats were performed.    FINDINGS:    LUNGS AND AIRWAYS: Patent central airways.  Calcified nodule abutting the left anterior pleura (series 4, image 57). 3 mm nodule in the left upper lobe (series 4, image 39). Additional 2 mm nodule in the left upper lobe (series 4, image 58).  PLEURA: No pleural effusion.  MEDIASTINUM AND ERICA: Multiple enlarged supraclavicular lymph nodes. Additional axillary and mediastinal lymphadenopathy is seen.  VESSELS: Within normal limits.  HEART: Heart size is normal. No pericardial effusion.  CHEST WALL AND LOWER NECK: Calcifications within the bilateral thyroid lobes.  VISUALIZED UPPER ABDOMEN: Please refer to prior CT abdomen/pelvis.  BONES: Degenerative changes of the spine.    IMPRESSION:  Lymphadenoapthy described above is consistent with CLL as per patient history.    No CT evidence for pneumonia.                --- End of Report ---              KADEEM YE MD; Resident Radiology  This document has been electronically signed.  REYMUNDO VARGAS MD; Attending Radiologist  This document has been electronically signed. Oct  5 2021 12:09PM    < end of copied text >  < from: CT Abdomen and Pelvis w/ IV Cont (10.04.21 @ 09:30) >    EXAM:  CT ABDOMEN AND PELVIS IC                            PROCEDURE DATE:  10/04/2021            INTERPRETATION:  CLINICAL INFORMATION: Left lower quadrant pain    COMPARISON: None.    CONTRAST/COMPLICATIONS:  IV Contrast: Omnipaque 350  90 cc administered   10 cc discarded  Oral Contrast: None  Complications: None    PROCEDURE:  CT of the Abdomen and Pelvis was performed.  Sagittal and coronal reformats were performed.    FINDINGS:  LOWER CHEST: Bibasilar atelectasis. Partially imaged enlarged right axillary lymph nodes.    LIVER: Within normal limits.  BILE DUCTS: Normal caliber.  GALLBLADDER: Within normal limits.  SPLEEN: Within normal limits.  PANCREAS: Fatty atrophy of the pancreas without cutoff. No ductal dilatation or cutoff.  ADRENALS: Within normal limits.  KIDNEYS/URETERS: Within normal limits.    BLADDER/REPRODUCTIVE ORGANS: Multiloculated right adnexal cystic mass measuring 14.6 x 9.0 cm, which abuts the appendix. Left adnexal cyst measures 2.1 x 1.4 cm. Uterus is unremarkable.    BOWEL: No bowel obstruction. Appendix is normal. Colonic diverticulosis.  PERITONEUM: 2 small nodules are present adjacent to the cystic lesion measuring 4 and 6 mm in diameter, respectively (2:75 and 79), possibly peritoneal nodules or small pericolonic lymph nodes.  VESSELS: Within normal limits.  RETROPERITONEUM/LYMPH NODES: Enlarged bilateral inguinal, iliac, mesenteric, and retroperitoneal lymph nodes.  *  Reference left inguinal lymph node measures up to 4.0 x 2.5 cm (2:93).  *  Reference left periaortic lymph node measures up to 2.5 x 2.3 cm (2:45).  ABDOMINAL WALL: Within normal limits.  BONES: Degenerative changes. Lumbar dextroscoliosis.    IMPRESSION:  Enlarged bilateral inguinal, iliac, mesenteric, and retroperitoneal lymph nodes. Partially imaged enlarged right axillary lymph nodes. Primary consideration is lymphoma. Metastatic disease is not excluded. Dedicated chest imaging is recommended for full evaluation.    Cystic right adnexal mass measuring 14.6 x 9.0 cm, concerning for epithelial neoplasm.      --- End of Report ---      WALTER ALANIS MD; Resident Interventional Radiology  This document has been electronically signed.  DAREN MOHAN MD; Attending Radiologist  This document has been electronically signed. Oct  4 2021 11:14AM    < end of copied text >        < from: MR Head w/wo IV Cont (10.12.21 @ 22:23) >    EXAM:  MR BRAIN WAW IC                            PROCEDURE DATE:  10/12/2021            INTERPRETATION:  INDICATIONS:  Change in mental status. History of CLL with new ovarian mass.    TECHNIQUE:  Multiplanar imaging was performed using T1 weighted, T2 weighted and FLAIR sequences.  Diffusion weighted and susceptibility sensitive images were also obtained.  Following intravenous gadolinium, multiplanar T1 weighted images were performed. 7.5 cc Gadavist were administered. 0 cc were discarded.    COMPARISON EXAMINATION:  10/4/2021    FINDINGS: The study is degraded by motion artifact. Repeat sequences were performed.  VENTRICLES AND SULCI:  Prominent in size compatible with age-appropriate volume loss  INTRA-AXIAL:  Patchy areas of white matter T2 hyperintensity are seen compatible with mild microvascular-type changes. No mass effect or abnormal enhancement is seen. No diffusion restriction is seen to suggest acute ischemia.  EXTRA-AXIAL:  There is diffuse bilateral hemispheric mild dural thickening and enhancement demonstrating increased FLAIR signal and mild nodularity.  VISUALIZED SINUSES:  Normal.  VISUALIZED MASTOIDS:  Mild nonspecific soft tissue bilaterally  CALVARIUM:  Normal.  CAROTID FLOW VOIDS:  Present.  MISCELLANEOUS:  Left intraocular lens implant      IMPRESSION:  Mild diffuse dural thickening/enhancement along the hemispheric convexities. While this may be secondary to recent lumbar puncture, other inflammatory or neoplastic etiologies are not excluded. Continued follow-up is advised.    Otherwise no mass effect or acute cerebral ischemia.    --- End of Report ---                UGO SZYMANSKI MD; Attending Radiologist  This document has been electronically signed. Oct 13 2021 10:25AM    < end of copied text >

## 2021-10-18 NOTE — PROGRESS NOTE ADULT - ATTENDING COMMENTS
Pt care and plan discussed and reviewed with NP. Plan as outlined above edited by me to reflect our discussion.
Above noted. Patient will require treatment of hypercalcemia; recommend pamidronate;  and biopsy of the ovarian mass. Recommend reconsultation with GYN and assessment if they recommend IR biopsy. Patient remains confused and requiring assistance with all activities.  We are hold off on treatment of CLL due poor performance status
Pt care and plan discussed and reviewed with NP. Plan as outlined above edited by me to reflect our discussion.

## 2021-10-18 NOTE — PROGRESS NOTE ADULT - ASSESSMENT
79yo F with  new onset CLL, with possible staging of ovarian cancer, HTN, HLD, RA, anxiety, s/p fall and fevers.   AMS likely 2/2 infection.  dizziness better + UTI  CTH and CT c spien unremarkable   CT C A P  LDL 44, A1c 5.2  b12 WNL  MRI brain w/ adn w/o shows dural enhancement   10/12 tmax 102.8 started vanco/toby, more lethargic   10/13 MRI as above   10/16 clinically improving   10/18 afebrile off abx   - MRI findings c/w possible neoplastic CNS process from CLL vs meningitis. never had LP.  clinically she is improving on vanco/toby for presumed aspiration PNA.  need to clarify GOC with family.  would consider holding off agressive workup with brain bx or LP until repeat imaging in 2-4weeks with and w/o for evolution and after GOC discussion especially given clinical improvement in mental status.   - infectious workup on vanco/toby. pervsiously ctx for uti. now completed.  will consider LP per ID if another fever   - heme/onc for CLL. recs appreciated   - HARJIT, IVF  - check orthostatics   - PT/OT/SS/SLP, OOBC  - check FS, glucose control <180  - GI/DVT ppx  - Counseling on diet, exercise, and medication adherence was done  - Counseling on smoking cessation and alcohol consumption offered when appropriate.  - Pain assessed and judicious use of narcotics when appropriate was discussed.    - Stroke education given when appropriate.  - Importance of fall prevention discussed.   - Differential diagnosis and plan of care discussed with patient and/or family and primary team  - Thank you for allowing me to participate in the care of this patient. Call with questions.   - d/c planning   RA Thompson MD  Vascular Neurology  Office: [phone]

## 2021-10-18 NOTE — PROGRESS NOTE ADULT - SUBJECTIVE AND OBJECTIVE BOX
CC: f/u for fever    Patient reports  she is ok  REVIEW OF SYSTEMS:  All other review of systems negative (Comprehensive ROS)    Antimicrobials Day #  :  valACYclovir 500 milliGRAM(s) Oral two times a day    Other Medications Reviewed    T(F): 97.6 (10-18-21 @ 09:43), Max: 98 (10-17-21 @ 17:27)  HR: 71 (10-18-21 @ 09:43)  BP: 130/74 (10-18-21 @ 09:43)  RR: 18 (10-18-21 @ 09:43)  SpO2: 97% (10-18-21 @ 09:43)  Wt(kg): --    PHYSICAL EXAM:  General: more alert, no acute distress  Eyes:  anicteric, no conjunctival injection, no discharge  Oropharynx: no lesions or injection 	  Neck: supple, without adenopathy  Lungs: clear to auscultation  Heart: regular rate and rhythm; no murmur, rubs or gallops  Abdomen: soft, nondistended, nontender, without mass or organomegaly  Skin: no lesions  Extremities: no clubbing, cyanosis, or edema  Neurologic: , moves all extremities    LAB RESULTS:                        11.1   20.59 )-----------( 245      ( 18 Oct 2021 11:13 )             34.4               MICROBIOLOGY:  RECENT CULTURES:      RADIOLOGY REVIEWED:  < from: MR Head w/wo IV Cont (10.12.21 @ 22:23) >    EXAM:  MR BRAIN WAW IC                            PROCEDURE DATE:  10/12/2021            INTERPRETATION:  INDICATIONS:  Change in mental status. History of CLL with new ovarian mass.    TECHNIQUE:  Multiplanar imaging was performed using T1 weighted, T2 weighted and FLAIR sequences.  Diffusion weighted and susceptibility sensitive images were also obtained.  Following intravenous gadolinium, multiplanar T1 weighted images were performed. 7.5 cc Gadavist were administered. 0 cc were discarded.    COMPARISON EXAMINATION:  10/4/2021    FINDINGS: The study is degraded by motion artifact. Repeat sequences were performed.  VENTRICLES AND SULCI:  Prominent in size compatible with age-appropriate volume loss  INTRA-AXIAL:  Patchy areas of white matter T2 hyperintensity are seen compatible with mild microvascular-type changes. No mass effect or abnormal enhancement is seen. No diffusion restriction is seen to suggest acute ischemia.  EXTRA-AXIAL:  There is diffuse bilateral hemispheric mild dural thickening and enhancement demonstrating increased FLAIR signal and mild nodularity.  VISUALIZED SINUSES:  Normal.  VISUALIZED MASTOIDS:  Mild nonspecific soft tissue bilaterally  CALVARIUM:  Normal.  CAROTID FLOW VOIDS:  Present.  MISCELLANEOUS:  Left intraocular lens implant      IMPRESSION:  Mild diffuse dural thickening/enhancement along the hemispheric convexities. While this may be secondary to recent lumbar puncture, other inflammatory or neoplastic etiologies are not excluded. Continued follow-up is advised.    Otherwise no mass effect or acute cerebral ischemia.    --- End of Report ---                UGO SZYMANSKI MD; Attending Radiologist  This document has been electronically signed. Oct 13 2021 10:25AM    < end of copied text >    < from: CT Abdomen and Pelvis w/ IV Cont (10.04.21 @ 09:30) >    EXAM:  CT ABDOMEN AND PELVIS IC                            PROCEDURE DATE:  10/04/2021            INTERPRETATION:  CLINICAL INFORMATION: Left lower quadrant pain    COMPARISON: None.    CONTRAST/COMPLICATIONS:  IV Contrast: Omnipaque 350  90 cc administered   10 cc discarded  Oral Contrast: None  Complications: None    PROCEDURE:  CT of the Abdomen and Pelvis was performed.  Sagittal and coronal reformats were performed.    FINDINGS:  LOWER CHEST: Bibasilar atelectasis. Partially imaged enlarged right axillary lymph nodes.    LIVER: Within normal limits.  BILE DUCTS: Normal caliber.  GALLBLADDER: Within normal limits.  SPLEEN: Within normal limits.  PANCREAS: Fatty atrophy of the pancreas without cutoff. No ductal dilatation or cutoff.  ADRENALS: Within normal limits.  KIDNEYS/URETERS: Within normal limits.    BLADDER/REPRODUCTIVE ORGANS: Multiloculated right adnexal cystic mass measuring 14.6 x 9.0 cm, which abuts the appendix. Left adnexal cyst measures 2.1 x 1.4 cm. Uterus is unremarkable.    BOWEL: No bowel obstruction. Appendix is normal. Colonic diverticulosis.  PERITONEUM: 2 small nodules are present adjacent to the cystic lesion measuring 4 and 6 mm in diameter, respectively (2:75 and 79), possibly peritoneal nodules or small pericolonic lymph nodes.  VESSELS: Within normal limits.  RETROPERITONEUM/LYMPH NODES: Enlarged bilateral inguinal, iliac, mesenteric, and retroperitoneal lymph nodes.  *  Reference left inguinal lymph node measures up to 4.0 x 2.5 cm (2:93).  *  Reference left periaortic lymph node measures up to 2.5 x 2.3 cm (2:45).  ABDOMINAL WALL: Within normal limits.  BONES: Degenerative changes. Lumbar dextroscoliosis.    IMPRESSION:  Enlarged bilateral inguinal, iliac, mesenteric, and retroperitoneal lymph nodes. Partially imaged enlarged right axillary lymph nodes. Primary consideration is lymphoma. Metastatic disease is not excluded. Dedicated chest imaging is recommended for full evaluation.    Cystic right adnexal mass measuring 14.6 x 9.0 cm, concerning for epithelial neoplasm.              --- End of Report ---      < end of copied text >            Assessment:  Elderly woman admitted a few weeks ago with fever, FTT, had recent dx of CLL but not tx, ovarian mass not biopsied,  initially got a course of ceftiaxone for possible uti. Fever resolved. Last week became obtuneded, febrile again, had a course of vanco and meropenem for concern of aspiration pneumonia. Had mri showing dural enhancement for further w/u as out pt. No further fever, leukocytosis c/w CLL. No ID contraindication to rehab d/c  Plan:  Monitor off antibiotics  for f/u mri head in a month and neuro will decide on further w/u  f/u with gyn for pelvic mass  no ID objection to d/c

## 2021-10-18 NOTE — PROGRESS NOTE ADULT - ASSESSMENT
Patient is a 80 PMHx of new onset CLL, with possible staging of ovarian cancer, HTN, HLD, CC s/p fall and fevers.  Per daughter, pt lives at home alone, fell two times while getting out of bed. States she fell onto both hands and knees while rolling out of bed. No LOC.  Denied any lightheadedness, dizziness at the time of fall.  Currently states she has some dizziness, like she would fall over if she had to walk. Was brought in today due to having a fever. PT complaining of SOB as well, and has pain in both knees. Denies cough, headache, chest pain, nausea, vomiting. Has not started any chemotherapy per daughter    #Leukocytosis with fever  -Patient is afebrile  -As per ID, Vancomycin and Meropenam for elevated WBC count D/C on 10/17, will monitor  -WBC count downtrending, as per ID montor off of Abx   -As per ID, may need lumbar puncture , likely out patient if recurrent MRI findings ater repeat imaging in 1-2 month   -culture results no growth to date   -Procalcitonin level noted   -MR brain completed 10/12, results noted   - passed S&S, oral feeding, aspiration precautions     # AMS and dizziness   CT head noted  fall precautions  orthostatics  Neurology eval appreciated follow up recs  -As per neuro brain MRI w/ and w/o IV contrast completed 10/12, results noted, as per Neurology, palliative care was consulted    - dural thickening noted on MRI. discussed with neurology. patient to repeat MRI in 1-2 month and if no improvement or recurrent findings, she may need LP to R/O leptomeningeal disease.   will discuss plan with patient's daughter     # Sepsis  R/O sepsis   UTI vs tumor fever vs other etiologies   Pan cx   Completed course of Rocephin, stopped on 10/08 as per ID  monitor clinically   ID eval appreciated   Hydration as tolerated  Pan CT noted      # HARJIT   HARJIT will monitor renal function  -Elevated calcium, as per renal Lasix is on hold due to likely dehydration, repeat BMP to monitor, and limit fluids to 50cc/hr for 1L, ionized calcium improving   Monitor renal function  I and Os   As per nephrology, monitor renal function and avoid nephrotoxic   -As per renal, monitor Na levels, free water restriction to 1L/day    #Hypercalcemia   -PTH level and Calcium levels noted. Will get endo evaluation   -As per heme/onc, IVF started BS 50cc/Hr or 24 hours  -Ionized calcium improving today. Will monitor       # Ovarian Ca, as per GYN/Onc there is no ovarian CA   likely due to her CLL   seen on CT   onc follow up   Patient follows with TRUNG, outpatient follow up     # HTN   No on an antihypertensive medication   Monitor BP      # HLD   lipid panel     #Wheezing, fluid overload likely   Cont Duonebs  will adjust diuresis, on hold for now   BNP results noted. Will follow  Echo ordered  Card wali appreciated     DVT and GI PPX       D/C planning

## 2021-10-18 NOTE — PROGRESS NOTE ADULT - SUBJECTIVE AND OBJECTIVE BOX
[facility]   MD LAN RIVERO MD RUORU WONG, PA    TEL:  OFFICE: [phone]  DR VILLASENOR CELL: [phone]  HERMELINDA COLEMAN CELL: [phone]  DR. CAMARA CELL: [phone]      FROM 5 PM - 7 AM PLEASE CALL ANSWERING SERVICE: [phone]    RENAL FOLLOW UP NOTE--Date of Service 10-18-21 @ 08:10  --------------------------------------------------------------------------------  HPI:      Pt seen and examined at bedside.       PAST HISTORY  --------------------------------------------------------------------------------  No significant changes to PMH, PSH, FHx, SHx, unless otherwise noted    ALLERGIES & MEDICATIONS  --------------------------------------------------------------------------------  Allergies    penicillin (Unknown)    Intolerances    Biaxin (Other)    Standing Inpatient Medications  amitriptyline 10 milliGRAM(s) Oral at bedtime  ascorbic acid 500 milliGRAM(s) Oral daily  heparin   Injectable 5000 Unit(s) SubCutaneous every 8 hours  influenza   Vaccine 0.5 milliLiter(s) IntraMuscular once  lidocaine   4% Patch 1 Patch Transdermal every 24 hours  LORazepam     Tablet 2 milliGRAM(s) Oral two times a day  multivitamin 1 Tablet(s) Oral daily  perphenazine 2 milliGRAM(s) Oral at bedtime  polyethylene glycol 3350 17 Gram(s) Oral daily  senna 2 Tablet(s) Oral at bedtime  valACYclovir 500 milliGRAM(s) Oral two times a day    PRN Inpatient Medications  acetaminophen   Tablet .. 650 milliGRAM(s) Oral every 6 hours PRN  acetaminophen   Tablet .. 650 milliGRAM(s) Oral every 6 hours PRN  albuterol/ipratropium for Nebulization 3 milliLiter(s) Nebulizer every 6 hours PRN      REVIEW OF SYSTEMS  --------------------------------------------------------------------------------  General: no fever  MSK: no edema     VITALS/PHYSICAL EXAM  --------------------------------------------------------------------------------  T(C): 36.4 (10-18-21 @ 05:12), Max: 36.7 (10-17-21 @ 17:27)  HR: 65 (10-18-21 @ 05:12) (56 - 67)  BP: 136/85 (10-18-21 @ 05:12) (124/56 - 136/85)  RR: 18 (10-18-21 @ 05:12) (16 - 18)  SpO2: 98% (10-18-21 @ 05:12) (94% - 98%)  Wt(kg): --        10-17-21 @ 07:01  -  10-18-21 @ 07:00  --------------------------------------------------------  IN: 720 mL / OUT: 2250 mL / NET: -1530 mL      Physical Exam:  	Gen: NAD  	HEENT: MMM  	Pulm: CTA B/L  	CV: S1S2  	Abd: Soft, +BS  	Ext: No LE edema B/L                      Neuro: Awake   	Skin: Warm and Dry   	Vascular access: NO HD catheter           ALFREDO majano  LABS/STUDIES  --------------------------------------------------------------------------------                Creatinine Trend:  SCr 0.84 [10-16 @ 07:30]  SCr 0.98 [10-15 @ 07:27]  SCr 1.05 [10-14 @ 07:04]  SCr 1.40 [10-13 @ 07:22]  SCr 1.60 [10-12 @ 06:46]    Urinalysis - [10-05-21 @ 17:05]      Color Yellow / Appearance Clear / SG 1.033 / pH 6.0      Gluc Negative / Ketone Negative  / Bili Negative / Urobili Negative       Blood Moderate / Protein 100 / Leuk Est Negative / Nitrite Negative      RBC 2 / WBC 3 / Hyaline 1 / Gran  / Sq Epi  / Non Sq Epi 2 / Bacteria Negative      PTH -- (Ca 10.4)      [10-14-21 @ 11:40]   12  PTH -- (Ca 12.2)      [10-11-21 @ 08:51]   8  Vitamin D (25OH) 65.1      [10-12-21 @ 08:57]  TSH 1.15      [10-05-21 @ 16:30]  Lipid: chol 87, , HDL 15, LDL --      [10-05-21 @ 16:30]      Free Light Chains: kappa 22.16, lambda 2.82, ratio = 7.86      [10-07 @ 17:52]

## 2021-10-19 ENCOUNTER — TRANSCRIPTION ENCOUNTER (OUTPATIENT)
Age: 81
End: 2021-10-19

## 2021-10-19 VITALS
TEMPERATURE: 98 F | SYSTOLIC BLOOD PRESSURE: 135 MMHG | OXYGEN SATURATION: 99 % | HEART RATE: 58 BPM | RESPIRATION RATE: 18 BRPM | DIASTOLIC BLOOD PRESSURE: 56 MMHG

## 2021-10-19 LAB — PTH RELATED PROT SERPL-MCNC: <2 PMOL/L — SIGNIFICANT CHANGE UP

## 2021-10-19 RX ORDER — MELOXICAM 15 MG/1
1 TABLET ORAL
Qty: 0 | Refills: 0 | DISCHARGE

## 2021-10-19 RX ORDER — PERPHENAZINE/AMITRIPTYLINE HCL 2 MG-10 MG
2 TABLET ORAL
Qty: 0 | Refills: 0 | DISCHARGE

## 2021-10-19 RX ORDER — PERPHENAZINE 8 MG/1
1 TABLET, FILM COATED ORAL
Qty: 0 | Refills: 0 | DISCHARGE
Start: 2021-10-19

## 2021-10-19 RX ORDER — ACETAMINOPHEN 500 MG
2 TABLET ORAL
Qty: 0 | Refills: 0 | DISCHARGE
Start: 2021-10-19

## 2021-10-19 RX ORDER — AMITRIPTYLINE HCL 25 MG
1 TABLET ORAL
Qty: 0 | Refills: 0 | DISCHARGE
Start: 2021-10-19

## 2021-10-19 RX ORDER — SENNA PLUS 8.6 MG/1
2 TABLET ORAL
Qty: 0 | Refills: 0 | DISCHARGE
Start: 2021-10-19

## 2021-10-19 RX ORDER — ASCORBIC ACID 60 MG
1 TABLET,CHEWABLE ORAL
Qty: 0 | Refills: 0 | DISCHARGE
Start: 2021-10-19

## 2021-10-19 RX ORDER — POLYETHYLENE GLYCOL 3350 17 G/17G
17 POWDER, FOR SOLUTION ORAL
Qty: 0 | Refills: 0 | DISCHARGE
Start: 2021-10-19

## 2021-10-19 RX ADMIN — LIDOCAINE 1 PATCH: 4 CREAM TOPICAL at 07:48

## 2021-10-19 RX ADMIN — Medication 2 MILLIGRAM(S): at 06:35

## 2021-10-19 RX ADMIN — HEPARIN SODIUM 5000 UNIT(S): 5000 INJECTION INTRAVENOUS; SUBCUTANEOUS at 06:33

## 2021-10-19 RX ADMIN — Medication 1 TABLET(S): at 12:24

## 2021-10-19 RX ADMIN — Medication 500 MILLIGRAM(S): at 12:25

## 2021-10-19 RX ADMIN — VALACYCLOVIR 500 MILLIGRAM(S): 500 TABLET, FILM COATED ORAL at 06:33

## 2021-10-19 RX ADMIN — VALACYCLOVIR 500 MILLIGRAM(S): 500 TABLET, FILM COATED ORAL at 17:27

## 2021-10-19 RX ADMIN — Medication 2 MILLIGRAM(S): at 17:31

## 2021-10-19 RX ADMIN — HEPARIN SODIUM 5000 UNIT(S): 5000 INJECTION INTRAVENOUS; SUBCUTANEOUS at 14:13

## 2021-10-19 RX ADMIN — POLYETHYLENE GLYCOL 3350 17 GRAM(S): 17 POWDER, FOR SOLUTION ORAL at 12:24

## 2021-10-19 NOTE — CHART NOTE - NSCHARTNOTESELECT_GEN_ALL_CORE
Event Note
Event Note
Palliative CAre/Event Note
Speech and Swallow
Hematology Chart Note
Nutrition Services
Palliative care/Event Note
wheezing/Event Note

## 2021-10-19 NOTE — DISCHARGE NOTE PROVIDER - NSDCCPCAREPLAN_GEN_ALL_CORE_FT
PRINCIPAL DISCHARGE DIAGNOSIS  Diagnosis: Fever  Assessment and Plan of Treatment: - Patient is afebrile  - Seen by ID  - Completed antibiotic course as per ID  - Now monitor off antibiotics  - ID recommends to hold off on Lumbar puncture for now as mental statis improved.  - Cultures results no growth to date   - passed swallow test oral feeding, aspiration precautions         SECONDARY DISCHARGE DIAGNOSES  Diagnosis: AMS (altered mental status)  Assessment and Plan of Treatment: - CTH and CT C-spine unremarkable   -MRI w/ and w/o IV contrast completed 10/12, results shows dural thickening possible neoplastic CNS process from CLL vs meningitis. Clinically she is improving on antibiotics. Hold off lumbar puncture   - Outpatient follow up       Diagnosis: CLL (chronic lymphocytic leukemia)  Assessment and Plan of Treatment: - Seen by onc, treatment of CLL on hold due poor performance status   - Outpatient follow up with Dr. Ayoub at [facility]      Diagnosis: Pelvic mass  Assessment and Plan of Treatment: - As per GYN/Onc there is no ovarian CA   - Likely due to her CLL   - Outpatient gynecology follo wup    Diagnosis: HARJIT (acute kidney injury)  Assessment and Plan of Treatment: - Seen by renal  -As per renal, monitor Na levels, free water restriction to 1L/day  - HARJIT resolved      Diagnosis: Hypercalcemia  Assessment and Plan of Treatment: - Received IVF, pamidronate  - Resolved    Diagnosis: Wheezing  Assessment and Plan of Treatment: - CXR with mild pulm vasc congestion  - Received IV lasix now on hold given recent HARJIT  - Seen by cardiology       PRINCIPAL DISCHARGE DIAGNOSIS  Diagnosis: Fever  Assessment and Plan of Treatment: - Patient is afebrile  - Seen by ID  - Completed antibiotic course as per ID  - Now monitor off antibiotics  - ID recommends to hold off on Lumbar puncture for now as mental statis improved.  - Cultures results no growth to date   - passed swallow test oral feeding, aspiration precautions         SECONDARY DISCHARGE DIAGNOSES  Diagnosis: AMS (altered mental status)  Assessment and Plan of Treatment: - CTH and CT C-spine unremarkable   -MRI w/ and w/o IV contrast completed 10/12, results shows dural thickening possible neoplastic CNS process from CLL vs meningitis. Clinically she is improving on antibiotics. Hold off lumbar puncture   - Outpatient follow up with neuro to repeat MRI with 2-4 weeks      Diagnosis: CLL (chronic lymphocytic leukemia)  Assessment and Plan of Treatment: - Seen by onc, treatment of CLL on hold due poor performance status   - Outpatient follow up with Dr. Ayoub at [facility]      Diagnosis: Pelvic mass  Assessment and Plan of Treatment: - As per GYN/Onc there is no ovarian CA   - Likely due to her CLL   - Outpatient gynecology follo wup    Diagnosis: HARJIT (acute kidney injury)  Assessment and Plan of Treatment: - Seen by renal  -As per renal, monitor Na levels, free water restriction to 1L/day  - HARJIT resolved      Diagnosis: Hypercalcemia  Assessment and Plan of Treatment: - Received IVF, pamidronate  - Resolved    Diagnosis: Wheezing  Assessment and Plan of Treatment: - CXR with mild pulm vasc congestion  - Received IV lasix now on hold given recent HARJIT  - Seen by cardiology      Diagnosis: Urinary retention  Assessment and Plan of Treatment: - Lopez in place  - TOV in the rehab

## 2021-10-19 NOTE — DISCHARGE NOTE NURSING/CASE MANAGEMENT/SOCIAL WORK - PATIENT PORTAL LINK FT
You can access the FollowMyHealth Patient Portal offered by [facility] by registering at the following website: [email or web address]. By joining [facility] FollowMyHealth portal, you will also be able to view your health information using other applications (apps) compatible with our system.

## 2021-10-19 NOTE — PROGRESS NOTE ADULT - REASON FOR ADMISSION
AMS
fever and weakness
fever and weakness
AMS
fever
fever and failure to thrive
fever and weakness
fever and weakness
weak
AMS
fever
fever, fall

## 2021-10-19 NOTE — DISCHARGE NOTE PROVIDER - HOSPITAL COURSE
80 PMHx of new onset CLL, with possible staging of ovarian cancer, HTN, HLD, CC s/p fall and fevers.  Per daughter, pt lives at home alone, fell two times while getting out of bed. States she fell onto both hands and knees while rolling out of bed. No LOC.  Denied any lightheadedness, dizziness at the time of fall.  Currently states she has some dizziness, like she would fall over if she had to walk. Was brought in today due to having a fever. PT complaining of SOB as well, and has pain in both knees. Denies cough, headache, chest pain, nausea, vomiting. Has not started any chemotherapy per daughter    #Leukocytosis with fever  -Patient is afebrile  -As per ID, Vancomycin and Meropenam for elevated WBC count D/C on 10/17, will monitor  -WBC count downtrending, as per ID montor off of Abx   -As per ID, may need lumbar puncture , likely out patient if recurrent MRI findings after repeat imaging in 1-2 month   -culture results no growth to date   -Procalcitonin level noted   -MR brain completed 10/12, results noted   - passed S&S, oral feeding, aspiration precautions     # AMS and dizziness   CT head noted  fall precautions  orthostatics  Neurology eval appreciated follow up recs  -As per neuro brain MRI w/ and w/o IV contrast completed 10/12, results noted, as per Neurology, palliative care was consulted    - dural thickening noted on MRI. discussed with neurology. patient to repeat MRI in 1-2 month and if no improvement or recurrent findings, she may need LP to R/O leptomeningeal disease.   will discuss plan with patient's daughter     # Sepsis  R/O sepsis   UTI vs tumor fever vs other etiologies   Pan cx   Completed course of Rocephin, stopped on 10/08 as per ID  monitor clinically   ID eval appreciated   Hydration as tolerated  Pan CT noted      # HARJIT   HARJIT will monitor renal function  -Elevated calcium, as per renal Lasix is on hold due to likely dehydration, repeat BMP to monitor, and limit fluids to 50cc/hr for 1L, ionized calcium improving   Monitor renal function  I and Os   As per nephrology, monitor renal function and avoid nephrotoxic   -As per renal, monitor Na levels, free water restriction to 1L/day    #Hypercalcemia   -PTH level and Calcium levels noted. Will get endo evaluation   -As per heme/onc, IVF started BS 50cc/Hr or 24 hours  -Ionized calcium improving today. Will monitor       # Ovarian Ca, as per GYN/Onc there is no ovarian CA   likely due to her CLL   seen on CT   onc follow up   Patient follows with TRUNG, outpatient follow up     # HTN   No on an antihypertensive medication   Monitor BP      # HLD   lipid panel     #Wheezing, fluid overload likely   Cont Duonebs  will adjust diuresis, on hold for now   BNP results noted. Will follow  Echo ordered  Card eval appreciated     Pt is medically stable and optimized to DC RA as per Dr. Smith, s/p pfizer x2.

## 2021-10-19 NOTE — DISCHARGE NOTE PROVIDER - DETAILS OF MALNUTRITION DIAGNOSIS/DIAGNOSES
This patient has been assessed with a concern for Malnutrition and was treated during this hospitalization for the following Nutrition diagnosis/diagnoses:     -  10/05/2021: Moderate protein-calorie malnutrition

## 2021-10-19 NOTE — DISCHARGE NOTE PROVIDER - CARE PROVIDERS DIRECT ADDRESSES
,[email or web address],DirectAddress_Unknown

## 2021-10-19 NOTE — PROGRESS NOTE ADULT - ASSESSMENT
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   She was hospitalized at [facility] a few months ago for a cardiac evaluation, which was negative, per daughter    Admitted to [facility] with fever and weakness associated with 2 falls on 10/03/21.  She developed fever on 10/03 with accelerated weakness and brought to ER for evaluation.   Fever to 101.4 in the ER prompted CTX.  Etiology of fever not clear although "B" symptoms of lymphoma possible but her decline in functional status seemed acute.   Reported abdominal pain and bloating but CT scan without acute pathology.   UA with 14 WBCs, urine cx with Klebsiella - doubt UTI   Blood cx NGTD   Started on empiric Ceftriaxone  CT chest without PNA  WNV serology neg  Quant TB test neg  Repeat RVP still neg   Repeated urine cx neg & blood cx NGTD  Last fever was on 10/05/21   Fevers resolved, but remains weak & deconditioned, interaction remains limited  Empiric Ceftriaxone was stopped on 10/08/21    She was seen by derm on 10/07 for new onset of clean based bilateral buttock & lower sacral area ulcers - started on Valtrex & ulcers were swabbed for HSV & CMV by derm  Wound cx of these ulcers recovered E faecalis - ulcers do not appear to be infected & E faecalis represents colonizing winter   Viral swab is negative for HSV & VZV    Fever in evening of 10/11 to 102.8  She was started on vanco and meropenem-temps are down  She has failed swallow evaluation  CXR with no clear infiltrates, MRI of brain pending.  Heme Onc note appreciated, CLL and possible Ovarian  malignancy, GYN to decide on biopsy  Hypercalcemia being treated.  MRI now with dural thickening, appreciate neurology  input-hold off on LP as mental statis  improved.  She appears to have improved with antibiotics-no fever, more alert, coherent.  It is not entirely clear what we are treating, perhaps a small aspiration  Vanco and meropenem stopped 10/17 after a 5 day course  Suggest:  1.Valtrex per derm  2.observe off antibiotics  3.? Resolved toxic metabolic encephalopathy  4.Goals of care will be important  5. If she spikes another fever I would favor LP as part of fever w/u

## 2021-10-19 NOTE — DISCHARGE NOTE PROVIDER - PROVIDER TOKENS
PROVIDER:[TOKEN:[[id number]],FOLLOWUP:[2 weeks]],PROVIDER:[TOKEN:[[id number]],FOLLOWUP:[2 weeks]] PROVIDER:[TOKEN:[[id number]],FOLLOWUP:[2 weeks]],PROVIDER:[TOKEN:[[id number]],FOLLOWUP:[2 weeks]],PROVIDER:[TOKEN:[[id number]],FOLLOWUP:[1 month]]

## 2021-10-19 NOTE — PROGRESS NOTE ADULT - PROVIDER SPECIALTY LIST ADULT
Cardiology
Infectious Disease
Infectious Disease
Internal Medicine
Nephrology
Neurology
Cardiology
Infectious Disease
Internal Medicine
Nephrology
Neurology
Heme/Onc
Infectious Disease
Internal Medicine
Nephrology
Neurology
Cardiology
Infectious Disease
Internal Medicine
Neurology
Internal Medicine
Nephrology

## 2021-10-19 NOTE — PROGRESS NOTE ADULT - SUBJECTIVE AND OBJECTIVE BOX
CC: f/u for fever and possible pneumonia    Patient reports: she appears comfortable, offers no complaints    REVIEW OF SYSTEMS:  All other review of systems negative (Comprehensive ROS)    Antimicrobials Day #  :  valACYclovir 500 milliGRAM(s) Oral two times a day    Other Medications Reviewed  MEDICATIONS  (STANDING):  amitriptyline 10 milliGRAM(s) Oral at bedtime  ascorbic acid 500 milliGRAM(s) Oral daily  heparin   Injectable 5000 Unit(s) SubCutaneous every 8 hours  influenza   Vaccine 0.5 milliLiter(s) IntraMuscular once  lidocaine   4% Patch 1 Patch Transdermal every 24 hours  LORazepam     Tablet 2 milliGRAM(s) Oral two times a day  multivitamin 1 Tablet(s) Oral daily  perphenazine 2 milliGRAM(s) Oral at bedtime  polyethylene glycol 3350 17 Gram(s) Oral daily  senna 2 Tablet(s) Oral at bedtime  valACYclovir 500 milliGRAM(s) Oral two times a day    T(F): 97.5 (10-19-21 @ 09:58), Max: 98 (10-18-21 @ 17:04)  HR: 56 (10-19-21 @ 09:58)  BP: 123/72 (10-19-21 @ 09:58)  RR: 18 (10-19-21 @ 09:58)  SpO2: 96% (10-19-21 @ 09:58)  Wt(kg): --    PHYSICAL EXAM:  General: alert, no acute distress  Eyes:  anicteric, no conjunctival injection, no discharge  Oropharynx: no lesions or injection 	  Neck: supple, without adenopathy  Lungs: clear to auscultation, decreased at bases  Heart: regular rate and rhythm; no murmur, rubs or gallops  Abdomen: soft, nondistended, nontender, without mass or organomegaly  Skin: no lesions  Extremities: no clubbing, cyanosis, or edema  Neurologic: alert, oriented, moves all extremities    LAB RESULTS:                        11.1   20.59 )-----------( 245      ( 18 Oct 2021 11:13 )             34.4     10-18    138  |  103  |  15  ----------------------------<  109<H>  4.0   |  24  |  0.77    Ca    9.5      18 Oct 2021 11:13    TPro  5.2<L>  /  Alb  3.0<L>  /  TBili  0.3  /  DBili  x   /  AST  54<H>  /  ALT  27  /  AlkPhos  130<H>  10-18    LIVER FUNCTIONS - ( 18 Oct 2021 11:13 )  Alb: 3.0 g/dL / Pro: 5.2 g/dL / ALK PHOS: 130 U/L / ALT: 27 U/L / AST: 54 U/L / GGT: x             MICROBIOLOGY:  RECENT CULTURES:      RADIOLOGY REVIEWED:

## 2021-10-19 NOTE — PROGRESS NOTE ADULT - NUTRITIONAL ASSESSMENT
This patient has been assessed with a concern for Malnutrition and has been determined to have a diagnosis/diagnoses of Moderate protein-calorie malnutrition.    This patient is being managed with:   Diet NPO-  Except Medications  Entered: Oct 11 2021  9:35PM    
This patient has been assessed with a concern for Malnutrition and has been determined to have a diagnosis/diagnoses of Moderate protein-calorie malnutrition.    This patient is being managed with:   Diet Soft-  Supplement Feeding Modality:  Oral  Ensure Enlive Cans or Servings Per Day:  2       Frequency:  Daily  Entered: Oct  6 2021 11:22AM    
This patient has been assessed with a concern for Malnutrition and has been determined to have a diagnosis/diagnoses of Moderate protein-calorie malnutrition.    This patient is being managed with:   Diet Dysphagia 2 Mechanical Soft-Honey Consistency Fluid-  Entered: Oct 14 2021  1:35PM    
This patient has been assessed with a concern for Malnutrition and has been determined to have a diagnosis/diagnoses of Moderate protein-calorie malnutrition.    This patient is being managed with:   Diet NPO-  Except Medications  Entered: Oct 11 2021  9:35PM    
This patient has been assessed with a concern for Malnutrition and has been determined to have a diagnosis/diagnoses of Moderate protein-calorie malnutrition.    This patient is being managed with:   Diet Dysphagia 2 Mechanical Soft-Honey Consistency Fluid-  Entered: Oct 14 2021  1:35PM    
This patient has been assessed with a concern for Malnutrition and has been determined to have a diagnosis/diagnoses of Moderate protein-calorie malnutrition.    This patient is being managed with:   Diet Dysphagia 2 Mechanical Soft-Honey Consistency Fluid-  Entered: Oct 14 2021  1:35PM    
This patient has been assessed with a concern for Malnutrition and has been determined to have a diagnosis/diagnoses of Moderate protein-calorie malnutrition.    This patient is being managed with:   Diet NPO-  Except Medications  Entered: Oct 11 2021  9:35PM    
This patient has been assessed with a concern for Malnutrition and has been determined to have a diagnosis/diagnoses of Moderate protein-calorie malnutrition.    This patient is being managed with:   Diet Soft-  1000mL Fluid Restriction ([id number])  Supplement Feeding Modality:  Oral  Ensure Enlive Cans or Servings Per Day:  2       Frequency:  Daily  Entered: Oct  9 2021 12:52PM    
This patient has been assessed with a concern for Malnutrition and has been determined to have a diagnosis/diagnoses of Moderate protein-calorie malnutrition.    This patient is being managed with:   Diet Soft-  1000mL Fluid Restriction ([id number])  Supplement Feeding Modality:  Oral  Ensure Enlive Cans or Servings Per Day:  2       Frequency:  Daily  Entered: Oct  9 2021 12:52PM    
This patient has been assessed with a concern for Malnutrition and has been determined to have a diagnosis/diagnoses of Moderate protein-calorie malnutrition.    This patient is being managed with:   Diet Soft-  Low Sodium  Supplement Feeding Modality:  Oral  Ensure Enlive Cans or Servings Per Day:  2       Frequency:  Daily  Entered: Oct  5 2021  1:31PM    
This patient has been assessed with a concern for Malnutrition and has been determined to have a diagnosis/diagnoses of Moderate protein-calorie malnutrition.    This patient is being managed with:   Diet Soft-  Supplement Feeding Modality:  Oral  Ensure Enlive Cans or Servings Per Day:  2       Frequency:  Daily  Entered: Oct  6 2021 11:22AM    
This patient has been assessed with a concern for Malnutrition and has been determined to have a diagnosis/diagnoses of Moderate protein-calorie malnutrition.    This patient is being managed with:   Diet Dysphagia 2 Mechanical Soft-Honey Consistency Fluid-  Entered: Oct 14 2021  1:35PM    
This patient has been assessed with a concern for Malnutrition and has been determined to have a diagnosis/diagnoses of Moderate protein-calorie malnutrition.    This patient is being managed with:   Diet NPO-  Except Medications  Entered: Oct 11 2021  9:35PM    
This patient has been assessed with a concern for Malnutrition and has been determined to have a diagnosis/diagnoses of Moderate protein-calorie malnutrition.    This patient is being managed with:   Diet Soft-  1000mL Fluid Restriction ([id number])  Supplement Feeding Modality:  Oral  Ensure Enlive Cans or Servings Per Day:  2       Frequency:  Daily  Entered: Oct  9 2021 12:52PM    
This patient has been assessed with a concern for Malnutrition and has been determined to have a diagnosis/diagnoses of Moderate protein-calorie malnutrition.    This patient is being managed with:   Diet Soft-  Low Sodium  Supplement Feeding Modality:  Oral  Ensure Enlive Cans or Servings Per Day:  2       Frequency:  Daily  Entered: Oct  5 2021  1:31PM    
This patient has been assessed with a concern for Malnutrition and has been determined to have a diagnosis/diagnoses of Moderate protein-calorie malnutrition.    This patient is being managed with:   Diet Soft-  Supplement Feeding Modality:  Oral  Ensure Enlive Cans or Servings Per Day:  2       Frequency:  Daily  Entered: Oct  6 2021 11:22AM

## 2021-10-19 NOTE — PROGRESS NOTE ADULT - ASSESSMENT
79yo F with  new onset CLL, with possible staging of ovarian cancer, HTN, HLD, RA, anxiety, s/p fall and fevers.   AMS likely 2/2 infection.  dizziness better + UTI  CTH and CT c spien unremarkable   CT C A P  LDL 44, A1c 5.2  b12 WNL  MRI brain w/ adn w/o shows dural enhancement   10/12 tmax 102.8 started vanco/toyb, more lethargic   10/13 MRI as above   10/16 clinically improving   10/18 afebrile off abx   10/19 dc planning   - MRI findings c/w possible neoplastic CNS process from CLL vs meningitis. never had LP.  clinically she is improving on vanco/toby for presumed aspiration PNA.  need to clarify GOC with family.  would consider holding off agressive workup with brain bx or LP until repeat imaging in 2-4weeks with and w/o for evolution and after GOC discussion especially given clinical improvement in mental status.   - infectious workup on vanco/toby. pervsiously ctx for uti. now completed.  will consider LP per ID if another fever   - heme/onc for CLL. recs appreciated   - HARJIT, IVF  - check orthostatics   - PT/OT/SS/SLP, OOBC  - check FS, glucose control <180  - GI/DVT ppx  - Counseling on diet, exercise, and medication adherence was done  - Counseling on smoking cessation and alcohol consumption offered when appropriate.  - Pain assessed and judicious use of narcotics when appropriate was discussed.    - Stroke education given when appropriate.  - Importance of fall prevention discussed.   - Differential diagnosis and plan of care discussed with patient and/or family and primary team  - Thank you for allowing me to participate in the care of this patient. Call with questions.   - d/c planning   RA today   Shreman Thompson MD  Vascular Neurology  Office: [phone]

## 2021-10-19 NOTE — PROGRESS NOTE ADULT - SUBJECTIVE AND OBJECTIVE BOX
Neurology Progress Note    S: Patient seen and examined. resting in bed. doing okay. clinically a bit better now off abx afebrile      Medication:  MEDICATIONS  (STANDING):  amitriptyline 10 milliGRAM(s) Oral at bedtime  ascorbic acid 500 milliGRAM(s) Oral daily  heparin   Injectable 5000 Unit(s) SubCutaneous every 8 hours  influenza   Vaccine 0.5 milliLiter(s) IntraMuscular once  lidocaine   4% Patch 1 Patch Transdermal every 24 hours  LORazepam     Tablet 2 milliGRAM(s) Oral two times a day  multivitamin 1 Tablet(s) Oral daily  perphenazine 2 milliGRAM(s) Oral at bedtime  polyethylene glycol 3350 17 Gram(s) Oral daily  senna 2 Tablet(s) Oral at bedtime  valACYclovir 500 milliGRAM(s) Oral two times a day    MEDICATIONS  (PRN):  acetaminophen   Tablet .. 650 milliGRAM(s) Oral every 6 hours PRN Temp greater or equal to 38C (100.4F)  acetaminophen   Tablet .. 650 milliGRAM(s) Oral every 6 hours PRN Mild Pain (1 - 3), Moderate Pain (4 - 6)  albuterol/ipratropium for Nebulization 3 milliLiter(s) Nebulizer every 6 hours PRN Shortness of Breath and/or Wheezing      Vitals:  Vital Signs Last 24 Hrs  T(C): 36.6 (10-19-21 @ 17:06), Max: 36.8 (10-19-21 @ 13:58)  T(F): 97.9 (10-19-21 @ 17:06), Max: 98.2 (10-19-21 @ 13:58)  HR: 58 (10-19-21 @ 17:06) (55 - 66)  BP: 135/56 (10-19-21 @ 17:06) (117/62 - 135/56)  BP(mean): --  RR: 18 (10-19-21 @ 17:06) (18 - 18)  SpO2: 99% (10-19-21 @ 17:06) (96% - 99%)            General Exam:   General Appearance: Appropriately dressed and in no acute distress       Head: Normocephalic, atraumatic and no dysmorphic features  Ear, Nose, and Throat: Moist mucous membranes  CVS: S1S2+  Resp: No SOB, no wheeze or rhonchi  GI: soft NT/ND  Extremities: No edema or cyanosis  Skin: No bruises or rashes     Neurological Exam:  Mental Status: Awake, alert and oriented x 1-2.  Able to follow simple and complex verbal commands. Able to name and repeat. fluent speech. No obvious aphasia or dysarthria noted. masked facies. + frontal release sigbns  more leathargif   Cranial Nerves: PERRL, EOMI, VFFC, sensation V1-V3 intact,  L facial asymmetry, equal elevation of palate, scm/trap 5/5, tongue is midline on protrusion. no obvious papilledema on fundoscopic exam. hearing is grossly intact.   Motor:HASTINGS but + tremor L>R moves Uppers>lowers   Sensation: Intact to light touch and pinprick throughout. no right/left confusion. no extinction to tactile on DSS.   Reflexes: 1+ throughout at biceps, brachioradialis, triceps, patellars and ankles bilaterally and equal. No clonus. R toe and L toe were both downgoing.  Coordination: no dysmetria FNF  Gait: deferred   I personally reviewed the below data/images/labs:    CBC Full  -  ( 18 Oct 2021 11:13 )  WBC Count : 20.59 K/uL  RBC Count : 3.33 M/uL  Hemoglobin : 11.1 g/dL  Hematocrit : 34.4 %  Platelet Count - Automated : 245 K/uL  Mean Cell Volume : 103.3 fl  Mean Cell Hemoglobin : 33.3 pg  Mean Cell Hemoglobin Concentration : 32.3 gm/dL  Auto Neutrophil # : 2.39 K/uL  Auto Lymphocyte # : 15.81 K/uL  Auto Monocyte # : 1.46 K/uL  Auto Eosinophil # : 0.74 K/uL  Auto Basophil # : 0.19 K/uL  Auto Neutrophil % : 11.6 %  Auto Lymphocyte % : 76.8 %  Auto Monocyte % : 7.1 %  Auto Eosinophil % : 3.6 %  Auto Basophil % : 0.9 %    10-18    138  |  103  |  15  ----------------------------<  109<H>  4.0   |  24  |  0.77    Ca    9.5      18 Oct 2021 11:13    TPro  5.2<L>  /  Alb  3.0<L>  /  TBili  0.3  /  DBili  x   /  AST  54<H>  /  ALT  27  /  AlkPhos  130<H>  10-18        < from: CT Head No Cont (10.04.21 @ 09:07) >      EXAM:  CT CERVICAL SPINE                          EXAM:  CT BRAIN                                PROCEDURE DATE:  10/04/2021            INTERPRETATION:  CLINICAL STATEMENT: Trauma..    TECHNIQUE: CT of the head and cervical spine were performed withoutIV contrast. 3-D/MIP images obtained    COMPARISON: None.    FINDINGS:  Head:  There is mild diffuse parenchymal volume loss. There are areas of low attenuation in the periventricular white matter likely related to mild chronic microvascular ischemicchanges.    There is no acute intracranial hemorrhage, parenchymal mass, mass effect or midline shift. There is no acute territorial infarct. There is no hydrocephalus. Partial empty sella    The cranium is intact.    Mild mucosal thickening right maxillary sinus    Cervical spine:  Vertebral body heights intact. Straightening of the cervical lordosis. Grade 1 retrolisthesis C3 on C4. Grade 1 anterolisthesis of C5 on C6    There is no prevertebral soft tissue swelling. The odontoid is intact.    Evaluation of the spinal canal is limited on a CT exam.  Multilevel degenerative changes noted resulting in multilevel spinal canal stenosis and neural foraminal narrowing.    Partially visualized cervical lymphadenopathy noted with multiple enlarged lymph nodes. Calcifications noted within the thyroid gland.    IMPRESSION:  No acute intracranial hemorrhage.    No acute fracture cervical spine. If pain persists, follow-up MRI exam recommended    Partially visualized cervical lymphadenopathy suspicious for lymphoma or metastatic disease. Correlate clinically    --- End of Report ---          ROBI RUTH MD; Attending Radiologist  This document has been electronically signed. Oct  4 2021  9:22AM    < end of copied text >      < from: CT Chest No Cont (10.04.21 @ 20:17) >    EXAM:  CT CHEST                            PROCEDURE DATE:  10/04/2021            INTERPRETATION:  CLINICAL INFORMATION: Shortness of breath, evaluate for pneumonia    COMPARISON: CT abdomen and pelvis 10/4/2021    CONTRAST/COMPLICATIONS:  IV Contrast: None  Oral Contrast: None  Complications: No immediate complication    PROCEDURE:  CT of the Chest was performed.  Sagittal and coronal reformats were performed.    FINDINGS:    LUNGS AND AIRWAYS: Patent central airways.  Calcified nodule abutting the left anterior pleura (series 4, image 57). 3 mm nodule in the left upper lobe (series 4, image 39). Additional 2 mm nodule in the left upper lobe (series 4, image 58).  PLEURA: No pleural effusion.  MEDIASTINUM AND ERICA: Multiple enlarged supraclavicular lymph nodes. Additional axillary and mediastinal lymphadenopathy is seen.  VESSELS: Within normal limits.  HEART: Heart size is normal. No pericardial effusion.  CHEST WALL AND LOWER NECK: Calcifications within the bilateral thyroid lobes.  VISUALIZED UPPER ABDOMEN: Please refer to prior CT abdomen/pelvis.  BONES: Degenerative changes of the spine.    IMPRESSION:  Lymphadenoapthy described above is consistent with CLL as per patient history.    No CT evidence for pneumonia.          --- End of Report ---      KADEEM YE MD; Resident Radiology  This document has been electronically signed.  REYMUNDO VARGAS MD; Attending Radiologist  This document has been electronically signed. Oct  5 2021 12:09PM    < end of copied text >  < from: CT Abdomen and Pelvis w/ IV Cont (10.04.21 @ 09:30) >    EXAM:  CT ABDOMEN AND PELVIS IC                            PROCEDURE DATE:  10/04/2021      INTERPRETATION:  CLINICAL INFORMATION: Left lower quadrant pain    COMPARISON: None.    CONTRAST/COMPLICATIONS:  IV Contrast: Omnipaque 350  90 cc administered   10 cc discarded  Oral Contrast: None  Complications: None    PROCEDURE:  CT of the Abdomen and Pelvis was performed.  Sagittal and coronal reformats were performed.    FINDINGS:  LOWER CHEST: Bibasilar atelectasis. Partially imaged enlarged right axillary lymph nodes.    LIVER: Within normal limits.  BILE DUCTS: Normal caliber.  GALLBLADDER: Within normal limits.  SPLEEN: Within normal limits.  PANCREAS: Fatty atrophy of the pancreas without cutoff. No ductal dilatation or cutoff.  ADRENALS: Within normal limits.  KIDNEYS/URETERS: Within normal limits.    BLADDER/REPRODUCTIVE ORGANS: Multiloculated right adnexal cystic mass measuring 14.6 x 9.0 cm, which abuts the appendix. Left adnexal cyst measures 2.1 x 1.4 cm. Uterus is unremarkable.    BOWEL: No bowel obstruction. Appendix is normal. Colonic diverticulosis.  PERITONEUM: 2 small nodules are present adjacent to the cystic lesion measuring 4 and 6 mm in diameter, respectively (2:75 and 79), possibly peritoneal nodules or small pericolonic lymph nodes.  VESSELS: Within normal limits.  RETROPERITONEUM/LYMPH NODES: Enlarged bilateral inguinal, iliac, mesenteric, and retroperitoneal lymph nodes.  *  Reference left inguinal lymph node measures up to 4.0 x 2.5 cm (2:93).  *  Reference left periaortic lymph node measures up to 2.5 x 2.3 cm (2:45).  ABDOMINAL WALL: Within normal limits.  BONES: Degenerative changes. Lumbar dextroscoliosis.    IMPRESSION:  Enlarged bilateral inguinal, iliac, mesenteric, and retroperitoneal lymph nodes. Partially imaged enlarged right axillary lymph nodes. Primary consideration is lymphoma. Metastatic disease is not excluded. Dedicated chest imaging is recommended for full evaluation.    Cystic right adnexal mass measuring 14.6 x 9.0 cm, concerning for epithelial neoplasm.      --- End of Report ---      WALTER ALANIS MD; Resident Interventional Radiology  This document has been electronically signed.  DAREN MOHAN MD; Attending Radiologist  This document has been electronically signed. Oct  4 2021 11:14AM    < end of copied text >        < from: MR Head w/wo IV Cont (10.12.21 @ 22:23) >    EXAM:  MR BRAIN WAW IC                            PROCEDURE DATE:  10/12/2021            INTERPRETATION:  INDICATIONS:  Change in mental status. History of CLL with new ovarian mass.    TECHNIQUE:  Multiplanar imaging was performed using T1 weighted, T2 weighted and FLAIR sequences.  Diffusion weighted and susceptibility sensitive images were also obtained.  Following intravenous gadolinium, multiplanar T1 weighted images were performed. 7.5 cc Gadavist were administered. 0 cc were discarded.    COMPARISON EXAMINATION:  10/4/2021    FINDINGS: The study is degraded by motion artifact. Repeat sequences were performed.  VENTRICLES AND SULCI:  Prominent in size compatible with age-appropriate volume loss  INTRA-AXIAL:  Patchy areas of white matter T2 hyperintensity are seen compatible with mild microvascular-type changes. No mass effect or abnormal enhancement is seen. No diffusion restriction is seen to suggest acute ischemia.  EXTRA-AXIAL:  There is diffuse bilateral hemispheric mild dural thickening and enhancement demonstrating increased FLAIR signal and mild nodularity.  VISUALIZED SINUSES:  Normal.  VISUALIZED MASTOIDS:  Mild nonspecific soft tissue bilaterally  CALVARIUM:  Normal.  CAROTID FLOW VOIDS:  Present.  MISCELLANEOUS:  Left intraocular lens implant      IMPRESSION:  Mild diffuse dural thickening/enhancement along the hemispheric convexities. While this may be secondary to recent lumbar puncture, other inflammatory or neoplastic etiologies are not excluded. Continued follow-up is advised.    Otherwise no mass effect or acute cerebral ischemia.    --- End of Report ---    UGO SZYMANSKI MD; Attending Radiologist  This document has been electronically signed. Oct 13 2021 10:25AM    < end of copied text >

## 2021-10-19 NOTE — CHART NOTE - NSCHARTNOTEFT_GEN_A_CORE
Reconsult cancelled as discussed with JUAN Dickinson.  Family not interested in speaking with team.  Patient being d/c to rehabilitation today.

## 2021-10-19 NOTE — DISCHARGE NOTE PROVIDER - CARE PROVIDER_API CALL
Raven Ayoub  HEMATOLOGY/ONCOLOGY  [address] [place] [address]  Phone: [phone]  Fax: [phone]  Follow Up Time: 2 weeks    Martha Leyva)  Gynecologic Oncology; Obstetrics and Gynecology  [address] [place] [address]  Phone: [phone]  Fax: [phone]  Follow Up Time: 2 weeks   Raven Ayoub  HEMATOLOGY/ONCOLOGY  [address] [place] [address]  Phone: [phone]  Fax: [phone]  Follow Up Time: 2 weeks    Martha Leyva)  Gynecologic Oncology; Obstetrics and Gynecology  [address] [place] [address]  Phone: [phone]  Fax: [phone]  Follow Up Time: 2 weeks    Sherman Thompson)  Neurology; Vascular Neurology  [address] [facility], [address]  [place] [address]  Phone: [phone]  Fax: [phone]  Follow Up Time: 1 month

## 2021-10-19 NOTE — DISCHARGE NOTE PROVIDER - NSDCFUADDAPPT_GEN_ALL_CORE_FT
Follow up with  Follow up with PCP within 2 weeks after discharge.  Follow up with oncology and GYN/Onc within 2 weeks after discharge.  Follow up with neuro within 2-4 weeks to repeat imaging.

## 2021-10-19 NOTE — DISCHARGE NOTE PROVIDER - NSDCMRMEDTOKEN_GEN_ALL_CORE_FT
acyclovir 5% topical ointment: Apply topically to affected area every other day    NOTE: Pharmacy dispensed directions 1 application 3 times a day  albuterol 90 mcg/inh inhalation aerosol: 2 puff(s) inhaled 2 times a day, As Needed  aspirin 81 mg oral delayed release tablet: 1 tab(s) orally once a day (in the evening)  atorvastatin 20 mg oral tablet: 1 tab(s) orally once a day  Caltrate 600 + D oral tablet: 1 tab(s) orally 2 times a day  Centrum Silver oral tablet: 1 tab(s) orally once a day  donepezil 10 mg oral tablet: 1 tab(s) orally once a day (in the morning)  Dulcolax Liquid 8% oral suspension: 10 milliliter(s) orally once a day, As Needed  Fish Oil oral capsule: 1 cap(s) orally once a day  folic acid 1 mg oral tablet: 1 tab(s) orally once a day  lidocaine 5% topical film: Apply topically to affected area once a day, As Needed  LORazepam 2 mg oral tablet: 2 tab(s) orally once a day (at bedtime)  meloxicam 7.5 mg oral tablet: 1 tab(s) orally 1 to 2 times a day, As Needed  methotrexate 2.5 mg oral tablet: 5 tab(s) orally Saturday and Sunday    NOTE: Pharmacy dispensed directions 5 tabs once  week  nystatin 100,000 units/g topical powder: Apply topically to affected area , As Needed - for rash  nystatin-triamcinolone 100,000 units/g-0.1% topical cream: Apply topically to affected area 2 times a day, As Needed - for rash  perphenazine-amitriptyline 2 mg-10 mg oral tablet: 2 tab(s) orally once a day (at bedtime)  RABEprazole 20 mg oral delayed release tablet: 1 tab(s) orally once a day  valACYclovir 500 mg oral tablet: 1 tab(s) orally every 12 hours  Vitamin D3 25 mcg (1000 intl units) oral tablet: 1 tab(s) orally 2 times a day   acetaminophen 325 mg oral tablet: 2 tab(s) orally every 6 hours, As needed, Mild Pain (1 - 3), Moderate Pain (4 - 6)  acyclovir 5% topical ointment: Apply topically to affected area every other day    NOTE: Pharmacy dispensed directions 1 application 3 times a day  albuterol 90 mcg/inh inhalation aerosol: 2 puff(s) inhaled 2 times a day, As Needed  amitriptyline 10 mg oral tablet: 1 tab(s) orally once a day (at bedtime)  ascorbic acid 500 mg oral tablet: 1 tab(s) orally once a day  aspirin 81 mg oral delayed release tablet: 1 tab(s) orally once a day (in the evening)  atorvastatin 20 mg oral tablet: 1 tab(s) orally once a day  Caltrate 600 + D oral tablet: 1 tab(s) orally 2 times a day  Centrum Silver oral tablet: 1 tab(s) orally once a day  donepezil 10 mg oral tablet: 1 tab(s) orally once a day (in the morning)  Dulcolax Liquid 8% oral suspension: 10 milliliter(s) orally once a day, As Needed  Fish Oil oral capsule: 1 cap(s) orally once a day  folic acid 1 mg oral tablet: 1 tab(s) orally once a day  lidocaine 5% topical film: Apply topically to affected area once a day, As Needed  LORazepam 2 mg oral tablet: 2 tab(s) orally once a day (at bedtime)  methotrexate 2.5 mg oral tablet: 5 tab(s) orally Saturday and Sunday    NOTE: Pharmacy dispensed directions 5 tabs once  week  nystatin 100,000 units/g topical powder: Apply topically to affected area , As Needed - for rash  nystatin-triamcinolone 100,000 units/g-0.1% topical cream: Apply topically to affected area 2 times a day, As Needed - for rash  perphenazine 2 mg oral tablet: 1 tab(s) orally once a day (at bedtime)  polyethylene glycol 3350 oral powder for reconstitution: 17 gram(s) orally once a day  RABEprazole 20 mg oral delayed release tablet: 1 tab(s) orally once a day  senna oral tablet: 2 tab(s) orally once a day (at bedtime)  valACYclovir 500 mg oral tablet: 1 tab(s) orally every 12 hours  Vitamin D3 25 mcg (1000 intl units) oral tablet: 1 tab(s) orally 2 times a day

## 2021-10-19 NOTE — PROGRESS NOTE ADULT - SUBJECTIVE AND OBJECTIVE BOX
[facility]   MD LNA RIVERO MD RUORU WONG, [place]    TEL:  OFFICE: [phone]  DR VILLASENOR CELL: [phone]  HERMELINDA COLEMAN CELL: [phone]  DR. CAMARA CELL: [phone]      FROM 5 PM - 7 AM PLEASE CALL ANSWERING SERVICE: [phone]    RENAL FOLLOW UP NOTE--Date of Service 10-19-21 @ 08:00  --------------------------------------------------------------------------------  HPI:      Pt seen and examined at bedside.       PAST HISTORY  --------------------------------------------------------------------------------  No significant changes to PMH, PSH, FHx, SHx, unless otherwise noted    ALLERGIES & MEDICATIONS  --------------------------------------------------------------------------------  Allergies    penicillin (Unknown)    Intolerances    Biaxin (Other)    Standing Inpatient Medications  amitriptyline 10 milliGRAM(s) Oral at bedtime  ascorbic acid 500 milliGRAM(s) Oral daily  heparin   Injectable 5000 Unit(s) SubCutaneous every 8 hours  influenza   Vaccine 0.5 milliLiter(s) IntraMuscular once  lidocaine   4% Patch 1 Patch Transdermal every 24 hours  LORazepam     Tablet 2 milliGRAM(s) Oral two times a day  multivitamin 1 Tablet(s) Oral daily  perphenazine 2 milliGRAM(s) Oral at bedtime  polyethylene glycol 3350 17 Gram(s) Oral daily  senna 2 Tablet(s) Oral at bedtime  valACYclovir 500 milliGRAM(s) Oral two times a day    PRN Inpatient Medications  acetaminophen   Tablet .. 650 milliGRAM(s) Oral every 6 hours PRN  acetaminophen   Tablet .. 650 milliGRAM(s) Oral every 6 hours PRN  albuterol/ipratropium for Nebulization 3 milliLiter(s) Nebulizer every 6 hours PRN      REVIEW OF SYSTEMS  --------------------------------------------------------------------------------  General: no fever  MSK: no edema     VITALS/PHYSICAL EXAM  --------------------------------------------------------------------------------  T(C): 36.6 (10-19-21 @ 05:53), Max: 36.7 (10-18-21 @ 17:04)  HR: 57 (10-19-21 @ 05:53) (57 - 71)  BP: 133/70 (10-19-21 @ 05:53) (123/70 - 133/70)  RR: 18 (10-19-21 @ 05:53) (16 - 18)  SpO2: 98% (10-19-21 @ 05:53) (97% - 99%)  Wt(kg): --        10-18-21 @ 07:01  -  10-19-21 @ 07:00  --------------------------------------------------------  IN: 700 mL / OUT: 2050 mL / NET: -1350 mL      Physical Exam:  	Gen: NAD  	HEENT: MMM  	Pulm: CTA B/L  	CV: S1S2  	Abd: Soft, +BS  	Ext: No LE edema B/L                      Neuro: Awake   	Skin: Warm and Dry   	Vascular access: NO HD catheter           ALFREDO majano  LABS/STUDIES  --------------------------------------------------------------------------------              11.1   20.59 >-----------<  245      [10-18-21 @ 11:13]              34.4     138  |  103  |  15  ----------------------------<  109      [10-18-21 @ 11:13]  4.0   |  24  |  0.77        Ca     9.5     [10-18-21 @ 11:13]    TPro  5.2  /  Alb  3.0  /  TBili  0.3  /  DBili  x   /  AST  54  /  ALT  27  /  AlkPhos  130  [10-18-21 @ 11:13]          Creatinine Trend:  SCr 0.77 [10-18 @ 11:13]  SCr 0.84 [10-16 @ 07:30]  SCr 0.98 [10-15 @ 07:27]  SCr 1.05 [10-14 @ 07:04]  SCr 1.40 [10-13 @ 07:22]    Urinalysis - [10-05-21 @ 17:05]      Color Yellow / Appearance Clear / SG 1.033 / pH 6.0      Gluc Negative / Ketone Negative  / Bili Negative / Urobili Negative       Blood Moderate / Protein 100 / Leuk Est Negative / Nitrite Negative      RBC 2 / WBC 3 / Hyaline 1 / Gran  / Sq Epi  / Non Sq Epi 2 / Bacteria Negative      PTH -- (Ca 10.4)      [10-14-21 @ 11:40]   12  PTH -- (Ca 12.2)      [10-11-21 @ 08:51]   8  Vitamin D (25OH) 65.1      [10-12-21 @ 08:57]  TSH 1.15      [10-05-21 @ 16:30]  Lipid: chol 87, , HDL 15, LDL --      [10-05-21 @ 16:30]      Free Light Chains: kappa 22.16, lambda 2.82, ratio = 7.86      [10-07 @ 17:52]

## 2021-10-19 NOTE — PROGRESS NOTE ADULT - SUBJECTIVE AND OBJECTIVE BOX
Name of Patient : PREM KAY  MRN: [id number]  Date of visit: 10-19-21 @ 13:51      Subjective: Patient seen and examined. No new events except as noted.     REVIEW OF SYSTEMS:    CONSTITUTIONAL: No weakness, fevers or chills  EYES/ENT: No visual changes;  No vertigo or throat pain   NECK: No pain or stiffness  RESPIRATORY: No cough, wheezing, hemoptysis; No shortness of breath  CARDIOVASCULAR: No chest pain or palpitations  GASTROINTESTINAL: No abdominal or epigastric pain. No nausea, vomiting, or hematemesis; No diarrhea or constipation. No melena or hematochezia.  GENITOURINARY: No dysuria, frequency or hematuria  NEUROLOGICAL: No numbness or weakness  SKIN: No itching, burning, rashes, or lesions   All other review of systems is negative unless indicated above.    MEDICATIONS:  MEDICATIONS  (STANDING):  amitriptyline 10 milliGRAM(s) Oral at bedtime  ascorbic acid 500 milliGRAM(s) Oral daily  heparin   Injectable 5000 Unit(s) SubCutaneous every 8 hours  influenza   Vaccine 0.5 milliLiter(s) IntraMuscular once  lidocaine   4% Patch 1 Patch Transdermal every 24 hours  LORazepam     Tablet 2 milliGRAM(s) Oral two times a day  multivitamin 1 Tablet(s) Oral daily  perphenazine 2 milliGRAM(s) Oral at bedtime  polyethylene glycol 3350 17 Gram(s) Oral daily  senna 2 Tablet(s) Oral at bedtime  valACYclovir 500 milliGRAM(s) Oral two times a day      PHYSICAL EXAM:  T(C): 36.4 (10-19-21 @ 09:58), Max: 36.7 (10-18-21 @ 17:04)  HR: 56 (10-19-21 @ 09:58) (56 - 71)  BP: 123/72 (10-19-21 @ 09:58) (123/70 - 133/70)  RR: 18 (10-19-21 @ 09:58) (17 - 18)  SpO2: 96% (10-19-21 @ 09:58) (96% - 99%)  Wt(kg): --  I&O's Summary    18 Oct 2021 07:01  -  19 Oct 2021 07:00  --------------------------------------------------------  IN: 700 mL / OUT: 2050 mL / NET: -1350 mL    19 Oct 2021 07:01  -  19 Oct 2021 13:51  --------------------------------------------------------  IN: 120 mL / OUT: 150 mL / NET: -30 mL          Appearance: Normal	  HEENT:  PERRLA   Lymphatic: No lymphadenopathy   Cardiovascular: Normal S1 S2, no JVD  Respiratory: normal effort , clear  Gastrointestinal:  Soft, Non-tender  Skin: No rashes,  warm to touch  Psychiatry:  Mood & affect appropriate  Musculuskeletal: No edema      All labs, Imaging and EKGs personally reviewed       10-18-21 @ 07:01  -  10-19-21 @ 07:00  --------------------------------------------------------  IN: 700 mL / OUT: 2050 mL / NET: -1350 mL    10-19-21 @ 07:01  -  10-19-21 @ 13:51  --------------------------------------------------------  IN: 120 mL / OUT: 150 mL / NET: -30 mL                            11.1   20.59 )-----------( 245      ( 18 Oct 2021 11:13 )             34.4               10-18    138  |  103  |  15  ----------------------------<  109<H>  4.0   |  24  |  0.77    Ca    9.5      18 Oct 2021 11:13    TPro  5.2<L>  /  Alb  3.0<L>  /  TBili  0.3  /  DBili  x   /  AST  54<H>  /  ALT  27  /  AlkPhos  130<H>  10-18

## 2021-10-19 NOTE — DISCHARGE NOTE NURSING/CASE MANAGEMENT/SOCIAL WORK - NSDCFUADDAPPT_GEN_ALL_CORE_FT
Follow up with PCP within 2 weeks after discharge.  Follow up with oncology and GYN/Onc within 2 weeks after discharge.  Follow up with neuro within 2-4 weeks to repeat imaging.

## 2021-10-19 NOTE — PROGRESS NOTE ADULT - ASSESSMENT
Patient is a 80 PMHx of new onset CLL, with possible staging of ovarian cancer, HTN, HLD, CC s/p fall and fevers.  Per daughter, pt lives at home alone, fell two times while getting out of bed. States she fell onto both hands and knees while rolling out of bed. No LOC.  Denied any lightheadedness, dizziness at the time of fall.  Currently states she has some dizziness, like she would fall over if she had to walk. Was brought in today due to having a fever. PT complaining of SOB as well, and has pain in both knees. Denies cough, headache, chest pain, nausea, vomiting. Has not started any chemotherapy per daughter    #Leukocytosis with fever  -Patient is afebrile, continue to monitor clinically   -As per ID, Vancomycin and Meropenam for elevated WBC count D/C on 10/17, will monitor  -WBC count downtrending, as per ID monitor off of Abx   -As per ID, may need lumbar puncture , likely out patient if recurrent MRI findings ater repeat imaging in 1-2 month   -culture results no growth to date   -Procalcitonin level noted   -MR brain completed 10/12, results noted   - passed S&S, oral feeding, aspiration precautions   -As Per ID if patient becomes febrile, then consider LP     # AMS and dizziness   CT head noted  fall precautions  orthostatics  Neurology eval appreciated follow up recs  -As per neuro brain MRI w/ and w/o IV contrast completed 10/12, results noted, as per Neurology, palliative care was consulted    - dural thickening noted on MRI. discussed with neurology. patient to repeat MRI in 1-2 month and if no improvement or recurrent findings, she may need LP to R/O leptomeningeal disease.   -As per neuro hold off on LP until further imaging  will discuss plan with patient's daughter     # Sepsis  R/O sepsis   UTI vs tumor fever vs other etiologies   Pan cx   Completed course of Rocephin, stopped on 10/08 as per ID  continue to monitor clinically   ID eval appreciated   Hydration as tolerated  Pan CT noted      # HARJIT   HARJIT will monitor renal function  -Elevated calcium, as per renal Lasix is on hold due to likely dehydration, repeat BMP to monitor, and limit fluids to 50cc/hr for 1L, ionized calcium improving   Monitor renal function  I and Os   As per nephrology, monitor renal function and avoid nephrotoxic   -As per renal, monitor Na levels, free water restriction to 1L/day    #Hypercalcemia   -PTH level and Calcium levels noted. Will get endo evaluation   -As per heme/onc, IVF started BS 50cc/Hr or 24 hours  -Ionized calcium improving today. Will monitor       # Ovarian Ca, as per GYN/Onc there is no ovarian CA   likely due to her CLL   seen on CT   onc follow up   Patient follows with [facility], outpatient follow up     # HTN   No on an antihypertensive medication   Monitor BP      # HLD   lipid panel     #Wheezing, fluid overload likely   Cont Duonebs  will adjust diuresis, on hold for now   BNP results noted. Will follow  Echo ordered  Card wali appreciated     DVT and GI PPX       D/C planning

## 2021-10-20 LAB — PTH RELATED PROT SERPL-MCNC: <2 PMOL/L — SIGNIFICANT CHANGE UP

## 2021-10-26 PROCEDURE — A9585: CPT

## 2021-10-26 PROCEDURE — 84295 ASSAY OF SERUM SODIUM: CPT

## 2021-10-26 PROCEDURE — 82947 ASSAY GLUCOSE BLOOD QUANT: CPT

## 2021-10-26 PROCEDURE — 86480 TB TEST CELL IMMUN MEASURE: CPT

## 2021-10-26 PROCEDURE — 84132 ASSAY OF SERUM POTASSIUM: CPT

## 2021-10-26 PROCEDURE — 87186 SC STD MICRODIL/AGAR DIL: CPT

## 2021-10-26 PROCEDURE — 86788 WEST NILE VIRUS AB IGM: CPT

## 2021-10-26 PROCEDURE — 83615 LACTATE (LD) (LDH) ENZYME: CPT

## 2021-10-26 PROCEDURE — 83036 HEMOGLOBIN GLYCOSYLATED A1C: CPT

## 2021-10-26 PROCEDURE — 82570 ASSAY OF URINE CREATININE: CPT

## 2021-10-26 PROCEDURE — 82330 ASSAY OF CALCIUM: CPT

## 2021-10-26 PROCEDURE — 86304 IMMUNOASSAY TUMOR CA 125: CPT

## 2021-10-26 PROCEDURE — 82378 CARCINOEMBRYONIC ANTIGEN: CPT

## 2021-10-26 PROCEDURE — 85014 HEMATOCRIT: CPT

## 2021-10-26 PROCEDURE — 83605 ASSAY OF LACTIC ACID: CPT

## 2021-10-26 PROCEDURE — 83970 ASSAY OF PARATHORMONE: CPT

## 2021-10-26 PROCEDURE — 82306 VITAMIN D 25 HYDROXY: CPT

## 2021-10-26 PROCEDURE — 92611 MOTION FLUOROSCOPY/SWALLOW: CPT

## 2021-10-26 PROCEDURE — 85730 THROMBOPLASTIN TIME PARTIAL: CPT

## 2021-10-26 PROCEDURE — 80202 ASSAY OF VANCOMYCIN: CPT

## 2021-10-26 PROCEDURE — 81001 URINALYSIS AUTO W/SCOPE: CPT

## 2021-10-26 PROCEDURE — 80076 HEPATIC FUNCTION PANEL: CPT

## 2021-10-26 PROCEDURE — 84145 PROCALCITONIN (PCT): CPT

## 2021-10-26 PROCEDURE — 84540 ASSAY OF URINE/UREA-N: CPT

## 2021-10-26 PROCEDURE — 80048 BASIC METABOLIC PNL TOTAL CA: CPT

## 2021-10-26 PROCEDURE — 93005 ELECTROCARDIOGRAM TRACING: CPT

## 2021-10-26 PROCEDURE — 74230 X-RAY XM SWLNG FUNCJ C+: CPT

## 2021-10-26 PROCEDURE — 82784 ASSAY IGA/IGD/IGG/IGM EACH: CPT

## 2021-10-26 PROCEDURE — 82652 VIT D 1 25-DIHYDROXY: CPT

## 2021-10-26 PROCEDURE — 87077 CULTURE AEROBIC IDENTIFY: CPT

## 2021-10-26 PROCEDURE — 86789 WEST NILE VIRUS ANTIBODY: CPT

## 2021-10-26 PROCEDURE — 83930 ASSAY OF BLOOD OSMOLALITY: CPT

## 2021-10-26 PROCEDURE — 97112 NEUROMUSCULAR REEDUCATION: CPT

## 2021-10-26 PROCEDURE — 83935 ASSAY OF URINE OSMOLALITY: CPT

## 2021-10-26 PROCEDURE — 87529 HSV DNA AMP PROBE: CPT

## 2021-10-26 PROCEDURE — 92526 ORAL FUNCTION THERAPY: CPT

## 2021-10-26 PROCEDURE — 0225U NFCT DS DNA&RNA 21 SARSCOV2: CPT

## 2021-10-26 PROCEDURE — 83880 ASSAY OF NATRIURETIC PEPTIDE: CPT

## 2021-10-26 PROCEDURE — 87252 VIRUS INOCULATION TISSUE: CPT

## 2021-10-26 PROCEDURE — 70450 CT HEAD/BRAIN W/O DYE: CPT | Mod: MA

## 2021-10-26 PROCEDURE — 85610 PROTHROMBIN TIME: CPT

## 2021-10-26 PROCEDURE — 71250 CT THORAX DX C-: CPT

## 2021-10-26 PROCEDURE — 92610 EVALUATE SWALLOWING FUNCTION: CPT

## 2021-10-26 PROCEDURE — 72125 CT NECK SPINE W/O DYE: CPT | Mod: MA

## 2021-10-26 PROCEDURE — 84443 ASSAY THYROID STIM HORMONE: CPT

## 2021-10-26 PROCEDURE — 85025 COMPLETE CBC W/AUTO DIFF WBC: CPT

## 2021-10-26 PROCEDURE — 85027 COMPLETE CBC AUTOMATED: CPT

## 2021-10-26 PROCEDURE — 97161 PT EVAL LOW COMPLEX 20 MIN: CPT

## 2021-10-26 PROCEDURE — 82803 BLOOD GASES ANY COMBINATION: CPT

## 2021-10-26 PROCEDURE — 83735 ASSAY OF MAGNESIUM: CPT

## 2021-10-26 PROCEDURE — 82435 ASSAY OF BLOOD CHLORIDE: CPT

## 2021-10-26 PROCEDURE — 93306 TTE W/DOPPLER COMPLETE: CPT

## 2021-10-26 PROCEDURE — 82746 ASSAY OF FOLIC ACID SERUM: CPT

## 2021-10-26 PROCEDURE — 94640 AIRWAY INHALATION TREATMENT: CPT

## 2021-10-26 PROCEDURE — 87086 URINE CULTURE/COLONY COUNT: CPT

## 2021-10-26 PROCEDURE — 80061 LIPID PANEL: CPT

## 2021-10-26 PROCEDURE — 36415 COLL VENOUS BLD VENIPUNCTURE: CPT

## 2021-10-26 PROCEDURE — 84550 ASSAY OF BLOOD/URIC ACID: CPT

## 2021-10-26 PROCEDURE — 85018 HEMOGLOBIN: CPT

## 2021-10-26 PROCEDURE — 87070 CULTURE OTHR SPECIMN AEROBIC: CPT

## 2021-10-26 PROCEDURE — 83519 RIA NONANTIBODY: CPT

## 2021-10-26 PROCEDURE — U0003: CPT

## 2021-10-26 PROCEDURE — 70553 MRI BRAIN STEM W/O & W/DYE: CPT

## 2021-10-26 PROCEDURE — 87798 DETECT AGENT NOS DNA AMP: CPT

## 2021-10-26 PROCEDURE — 86769 SARS-COV-2 COVID-19 ANTIBODY: CPT

## 2021-10-26 PROCEDURE — 87040 BLOOD CULTURE FOR BACTERIA: CPT

## 2021-10-26 PROCEDURE — 71045 X-RAY EXAM CHEST 1 VIEW: CPT

## 2021-10-26 PROCEDURE — 84300 ASSAY OF URINE SODIUM: CPT

## 2021-10-26 PROCEDURE — 74177 CT ABD & PELVIS W/CONTRAST: CPT | Mod: MA

## 2021-10-26 PROCEDURE — 73562 X-RAY EXAM OF KNEE 3: CPT

## 2021-10-26 PROCEDURE — 93970 EXTREMITY STUDY: CPT

## 2021-10-26 PROCEDURE — 82310 ASSAY OF CALCIUM: CPT

## 2021-10-26 PROCEDURE — 97110 THERAPEUTIC EXERCISES: CPT

## 2021-10-26 PROCEDURE — 80053 COMPREHEN METABOLIC PANEL: CPT

## 2021-10-26 PROCEDURE — U0005: CPT

## 2021-10-26 PROCEDURE — 99285 EMERGENCY DEPT VISIT HI MDM: CPT

## 2021-10-26 PROCEDURE — 82962 GLUCOSE BLOOD TEST: CPT

## 2021-10-26 PROCEDURE — 82607 VITAMIN B-12: CPT

## 2021-11-09 PROBLEM — R59.9 ADENOPATHY: Status: ACTIVE | Noted: 2021-08-18

## 2021-11-15 ENCOUNTER — OUTPATIENT (OUTPATIENT)
Dept: OUTPATIENT SERVICES | Facility: HOSPITAL | Age: 81
LOS: 1 days | Discharge: HOME | End: 2021-11-15

## 2021-11-15 DIAGNOSIS — Z41.9 ENCOUNTER FOR PROCEDURE FOR PURPOSES OTHER THAN REMEDYING HEALTH STATE, UNSPECIFIED: Chronic | ICD-10-CM

## 2021-11-15 DIAGNOSIS — D64.9 ANEMIA, UNSPECIFIED: ICD-10-CM

## 2021-11-16 ENCOUNTER — APPOINTMENT (OUTPATIENT)
Dept: GYNECOLOGIC ONCOLOGY | Facility: CLINIC | Age: 81
End: 2021-11-16

## 2021-11-16 DIAGNOSIS — R59.9 ENLARGED LYMPH NODES, UNSPECIFIED: ICD-10-CM

## 2021-12-01 ENCOUNTER — APPOINTMENT (OUTPATIENT)
Dept: GYNECOLOGIC ONCOLOGY | Facility: CLINIC | Age: 81
End: 2021-12-01

## 2021-12-22 ENCOUNTER — OUTPATIENT (OUTPATIENT)
Dept: OUTPATIENT SERVICES | Facility: HOSPITAL | Age: 81
LOS: 1 days | Discharge: HOME | End: 2021-12-22

## 2021-12-22 DIAGNOSIS — Z41.9 ENCOUNTER FOR PROCEDURE FOR PURPOSES OTHER THAN REMEDYING HEALTH STATE, UNSPECIFIED: Chronic | ICD-10-CM

## 2021-12-22 DIAGNOSIS — D64.9 ANEMIA, UNSPECIFIED: ICD-10-CM

## 2021-12-27 ENCOUNTER — RESULT REVIEW (OUTPATIENT)
Age: 81
End: 2021-12-27

## 2021-12-27 ENCOUNTER — APPOINTMENT (OUTPATIENT)
Dept: HEMATOLOGY ONCOLOGY | Facility: CLINIC | Age: 81
End: 2021-12-27
Payer: MEDICARE

## 2021-12-27 VITALS
TEMPERATURE: 97.4 F | OXYGEN SATURATION: 98 % | DIASTOLIC BLOOD PRESSURE: 72 MMHG | SYSTOLIC BLOOD PRESSURE: 114 MMHG | RESPIRATION RATE: 18 BRPM | HEART RATE: 71 BPM

## 2021-12-27 DIAGNOSIS — N83.8 OTHER NONINFLAMMATORY DISORDERS OF OVARY, FALLOPIAN TUBE AND BROAD LIGAMENT: ICD-10-CM

## 2021-12-27 DIAGNOSIS — C83.00 SMALL CELL B-CELL LYMPHOMA, UNSPECIFIED SITE: ICD-10-CM

## 2021-12-27 LAB
BASOPHILS # BLD AUTO: 0.05 K/UL — SIGNIFICANT CHANGE UP (ref 0–0.2)
BASOPHILS NFR BLD AUTO: 2 % — SIGNIFICANT CHANGE UP (ref 0–2)
EOSINOPHIL # BLD AUTO: 0.12 K/UL — SIGNIFICANT CHANGE UP (ref 0–0.5)
EOSINOPHIL NFR BLD AUTO: 5 % — SIGNIFICANT CHANGE UP (ref 0–6)
HCT VFR BLD CALC: 31.7 % — LOW (ref 34.5–45)
HGB BLD-MCNC: 10.4 G/DL — LOW (ref 11.5–15.5)
LYMPHOCYTES # BLD AUTO: 0.78 K/UL — LOW (ref 1–3.3)
LYMPHOCYTES # BLD AUTO: 32 % — SIGNIFICANT CHANGE UP (ref 13–44)
MCHC RBC-ENTMCNC: 32.8 G/DL — SIGNIFICANT CHANGE UP (ref 32–36)
MCHC RBC-ENTMCNC: 34.1 PG — HIGH (ref 27–34)
MCV RBC AUTO: 103.9 FL — HIGH (ref 80–100)
MONOCYTES # BLD AUTO: 0.07 K/UL — SIGNIFICANT CHANGE UP (ref 0–0.9)
MONOCYTES NFR BLD AUTO: 3 % — SIGNIFICANT CHANGE UP (ref 2–14)
NEUTROPHILS # BLD AUTO: 1.36 K/UL — LOW (ref 1.8–7.4)
NEUTROPHILS NFR BLD AUTO: 56 % — SIGNIFICANT CHANGE UP (ref 43–77)
NRBC # BLD: 1 /100 — HIGH (ref 0–0)
NRBC # BLD: SIGNIFICANT CHANGE UP /100 WBCS (ref 0–0)
PLAT MORPH BLD: NORMAL — SIGNIFICANT CHANGE UP
PLATELET # BLD AUTO: 196 K/UL — SIGNIFICANT CHANGE UP (ref 150–400)
RBC # BLD: 3.05 M/UL — LOW (ref 3.8–5.2)
RBC # FLD: 16.4 % — HIGH (ref 10.3–14.5)
RBC BLD AUTO: SIGNIFICANT CHANGE UP
VARIANT LYMPHS # BLD: 2 % — SIGNIFICANT CHANGE UP (ref 0–6)
WBC # BLD: 2.43 K/UL — LOW (ref 3.8–10.5)
WBC # FLD AUTO: 2.43 K/UL — LOW (ref 3.8–10.5)

## 2021-12-27 PROCEDURE — 99215 OFFICE O/P EST HI 40 MIN: CPT

## 2021-12-27 RX ORDER — IBRUTINIB 420 MG/1
420 TABLET, FILM COATED ORAL
Qty: 30 | Refills: 2 | Status: ACTIVE | COMMUNITY
Start: 2021-10-01 | End: 1900-01-01

## 2021-12-29 PROBLEM — N83.8 OVARIAN MASS: Status: ACTIVE | Noted: 2021-08-18

## 2021-12-29 PROBLEM — C83.00 LYMPHOMA, SMALL LYMPHOCYTIC: Status: ACTIVE | Noted: 2021-09-13

## 2021-12-29 LAB
ALBUMIN SERPL ELPH-MCNC: 3.4 G/DL
ALP BLD-CCNC: 73 U/L
ALT SERPL-CCNC: 13 U/L
ANION GAP SERPL CALC-SCNC: 11 MMOL/L
AST SERPL-CCNC: 42 U/L
BILIRUB SERPL-MCNC: 0.4 MG/DL
BUN SERPL-MCNC: 26 MG/DL
CALCIUM SERPL-MCNC: 9.1 MG/DL
CHLORIDE SERPL-SCNC: 104 MMOL/L
CO2 SERPL-SCNC: 21 MMOL/L
CREAT SERPL-MCNC: 1.16 MG/DL
GLUCOSE SERPL-MCNC: 123 MG/DL
POTASSIUM SERPL-SCNC: 4 MMOL/L
PROT SERPL-MCNC: 5.4 G/DL
SODIUM SERPL-SCNC: 136 MMOL/L

## 2021-12-29 NOTE — HISTORY OF PRESENT ILLNESS
[de-identified] : 81yo F w/ HTN, GERD, HLD, dementia on Aricept, RA on weekly MTX here for further management of newly diagnosed SLL.\par \par She was brought to the ED at [facility] in 7/2021 for exertional dyspnea a/w chest pain for over 6 months. Cardiac workup was unremarkable. Not long after, she then c/o left sided abdominal pain, lower extremity weakness. She went to Dr. Smith (GI) who ordered a CT. \par 8/11/2021- CT AP- \par  Liver/Gallbladder/Pancreas/Spleen/Kidneys/Bladder- Unremarkable\par  Thickening of bilateral adrenal glands\par  Gastrohepatic ligament adenopathy with LNs measuring up to 1.3 cm, there is periportal adenopathy with an enlarged 3.6 x 2 cm periportal node\par  There is bulky retroperitoneal adenopathy. There is a 2.7 x 1.7 cm left para-aortic node. There is a 3.1 x 2 cm aortocaval node. \par  There is bulky mesenteric lymphadenopathy. There is a 2.3 x 1.6 cm right mid abdominal mesenteric node\par  There is a 2.2 cm left ovarian cyst and a enlarged right ovary measuring 11.6 x 14.5 x 10.2 cm, with thickening, nodularity and septations. \par  There is an enlarged pelvic and inguinal LNs. There is a 2 x 1.3 cm left common iliac node. There is bilateral pelvic sidewall adenopathy measuring up to 3.9 x 1.7cm on the right. There are enlarged bilateral inguinal lymph nodes measuring up to 3.88 x 2.8 cm on the left. \par \par She lives alone. She lost about 30-40 pounds over the course of the last year, with decrease appetite. Also c/o constipation. \par She saw Dr. Leyva on 8/18 for evaluation. 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 s/p course of Valtrex. Using Acyclovir cream. It has been an ongoing issue for many years but is worse now and more frequent. \par  [de-identified] : PET/CT 9/17/21: 1.  FDG avid enlarged lymph nodes above and below the diaphragm. The most FDG avid centrally necrotic lymph node is in the left level 2 cervical region (SUV 17.2), and is concerning for lymphoma. Consider repeat biopsy under ultrasound guidance.\par 2.  Heterogeneous FDG avidity within the nonenlarged spleen with a small punctate FDG avid focus in the superior margin, indeterminate.\par 3.  Multicystic pelvic mass probably adnexal in origin, indeterminate.\par 4.  Eccentric FDG avidity in the left anal region is indeterminate. FDG avid sigmoid colonic focus is indeterminate, possibly polyp. Suggest further evaluation with colonoscopy.\par 5.  Two nonspecific FDG avid foci distal body/antrum of the stomach. Suggest clinical correlation and evaluation with upper endoscopy.\par 6.  FDG avid cutaneous areas right upper gluteal region. Suggest correlation with physical exam.\par \par Cervical LN biopsy done and is consistent w/ CLL/SLL. \par Plan was to start ibrutinib.\par However before starting she was admitted on 10/4/21 - 10/19/21 s/p fall at home and with fevers. She also had hypercalcemia while admitted. She had a UTI w/ Klebsiella s/p course of Abx. Brain MRI w/ and w/o IV contrast completed 10/12- dural thickening noted on MRI. discussed with neurology. patient to repeat MRI in 1-2 month and if no improvement or recurrent findings, she may need LP to R/O leptomeningeal disease. AMS thought likely 2/2 infection and it improved after Abx.  [Date: ____________] : Patient's last distress assessment performed on [unfilled]. [8 - Distress Level] : Distress Level: 8 [Patient given social work contact information and resource sheet] : Patient was given social work contact information and resource sheet

## 2021-12-29 NOTE — PHYSICAL EXAM
[Capable of only limited self care, confined to bed or chair more than 50% of waking hours] : Status 3- Capable of only limited self care, confined to bed or chair more than 50% of waking hours [Normal] : affect appropriate [de-identified] : left sided tenderness [de-identified] : b/l inguinal adenopathy

## 2021-12-29 NOTE — ASSESSMENT
[FreeTextEntry1] : 79yo F w/ HTN, GERD, HLD, dementia on Aricept, RA on weekly MTX here for further management of newly diagnosed SLL. She has lost 30-40 lbs within the last year. \par Her CBC is unremarkable, no lymphocytosis. Will defer BMbx at this time. \par \par I suspect her ovarian mass is unlikely related to SLL. \par We discussed consideration of treatment of SLL; however I am unsure whether there will be improvements in her symptoms. Side effects discussed, daughter agreeable. Will send script to [facility]\par Rheumatologist Dr. Joel Liu [phone] -will reach out to discuss plan of starting ibruitinib\par All questions answered\par RTC 2 weeks after starting ibrutinib

## 2021-12-29 NOTE — REVIEW OF SYSTEMS
[Recent Change In Weight] : ~T recent weight change [Chest Pain] : chest pain [Shortness Of Breath] : shortness of breath [Abdominal Pain] : abdominal pain [Constipation] : constipation [Negative] : Allergic/Immunologic [de-identified] : herpetic lesions on buttock

## 2022-01-03 ENCOUNTER — NON-APPOINTMENT (OUTPATIENT)
Age: 82
End: 2022-01-03

## 2022-02-07 ENCOUNTER — OUTPATIENT (OUTPATIENT)
Dept: OUTPATIENT SERVICES | Facility: HOSPITAL | Age: 82
LOS: 1 days | Discharge: HOME | End: 2022-02-07

## 2022-02-07 DIAGNOSIS — Z41.9 ENCOUNTER FOR PROCEDURE FOR PURPOSES OTHER THAN REMEDYING HEALTH STATE, UNSPECIFIED: Chronic | ICD-10-CM

## 2022-02-07 DIAGNOSIS — D64.9 ANEMIA, UNSPECIFIED: ICD-10-CM

## 2022-02-09 ENCOUNTER — APPOINTMENT (OUTPATIENT)
Dept: HEMATOLOGY ONCOLOGY | Facility: CLINIC | Age: 82
End: 2022-02-09

## 2022-03-29 ENCOUNTER — RX RENEWAL (OUTPATIENT)
Age: 82
End: 2022-03-29

## 2022-04-19 NOTE — PROGRESS NOTE ADULT - SUBJECTIVE AND OBJECTIVE BOX
Pt notified via portal    Patient Portal Status    This result is viewable by the patient in Patient Portal.     Last viewed in Patient Portal:     4/17/2022  8:37 PM     By:     Chica Griffin        Neurology Progress Note    S: Patient seen and examined. resting in bed. doing okay. clinically a bit better now off abx afebrile     Medication:  MEDICATIONS  (STANDING):  amitriptyline 10 milliGRAM(s) Oral at bedtime  ascorbic acid 500 milliGRAM(s) Oral daily  heparin   Injectable 5000 Unit(s) SubCutaneous every 8 hours  influenza   Vaccine 0.5 milliLiter(s) IntraMuscular once  lidocaine   4% Patch 1 Patch Transdermal every 24 hours  LORazepam     Tablet 2 milliGRAM(s) Oral two times a day  multivitamin 1 Tablet(s) Oral daily  perphenazine 2 milliGRAM(s) Oral at bedtime  polyethylene glycol 3350 17 Gram(s) Oral daily  senna 2 Tablet(s) Oral at bedtime  sodium chloride 0.9%. 1000 milliLiter(s) (50 mL/Hr) IV Continuous <Continuous>  sodium chloride 0.9%. 1000 milliLiter(s) (50 mL/Hr) IV Continuous <Continuous>  sodium chloride 0.9%. 1000 milliLiter(s) (50 mL/Hr) IV Continuous <Continuous>  valACYclovir 500 milliGRAM(s) Oral two times a day    MEDICATIONS  (PRN):  acetaminophen   Tablet .. 650 milliGRAM(s) Oral every 6 hours PRN Temp greater or equal to 38C (100.4F)  acetaminophen   Tablet .. 650 milliGRAM(s) Oral every 6 hours PRN Mild Pain (1 - 3), Moderate Pain (4 - 6)  albuterol/ipratropium for Nebulization 3 milliLiter(s) Nebulizer every 6 hours PRN Shortness of Breath and/or Wheezing        Vitals:  Vital Signs Last 24 Hrs  T(C): 36.5 (10-17-21 @ 09:05), Max: 37.1 (10-17-21 @ 05:00)  T(F): 97.7 (10-17-21 @ 09:05), Max: 98.7 (10-17-21 @ 05:00)  HR: 67 (10-17-21 @ 09:05) (60 - 69)  BP: 130/76 (10-17-21 @ 09:05) (102/55 - 140/74)  BP(mean): --  RR: 16 (10-17-21 @ 09:05) (16 - 18)  SpO2: 97% (10-17-21 @ 09:05) (96% - 98%)        General Exam:   General Appearance: Appropriately dressed and in no acute distress       Head: Normocephalic, atraumatic and no dysmorphic features  Ear, Nose, and Throat: Moist mucous membranes  CVS: S1S2+  Resp: No SOB, no wheeze or rhonchi  GI: soft NT/ND  Extremities: No edema or cyanosis  Skin: No bruises or rashes     Neurological Exam:  Mental Status: Awake, alert and oriented x 1-2.  Able to follow simple and complex verbal commands. Able to name and repeat. fluent speech. No obvious aphasia or dysarthria noted. masked facies. + frontal release sigbns  more leathargif   Cranial Nerves: PERRL, EOMI, VFFC, sensation V1-V3 intact,  L facial asymmetry, equal elevation of palate, scm/trap 5/5, tongue is midline on protrusion. no obvious papilledema on fundoscopic exam. hearing is grossly intact.   Motor:HASTINGS but + tremor L>R moves Uppers>lowers   Sensation: Intact to light touch and pinprick throughout. no right/left confusion. no extinction to tactile on DSS.   Reflexes: 1+ throughout at biceps, brachioradialis, triceps, patellars and ankles bilaterally and equal. No clonus. R toe and L toe were both downgoing.  Coordination: no dysmetria FNF  Gait: deferred   I personally reviewed the below data/images/labs:      CBC Full  -  ( 16 Oct 2021 07:30 )  WBC Count : 22.34 K/uL  RBC Count : 3.27 M/uL  Hemoglobin : 10.9 g/dL  Hematocrit : 33.2 %  Platelet Count - Automated : 181 K/uL  Mean Cell Volume : 101.5 fl  Mean Cell Hemoglobin : 33.3 pg  Mean Cell Hemoglobin Concentration : 32.8 gm/dL  Auto Neutrophil # : x  Auto Lymphocyte # : x  Auto Monocyte # : x  Auto Eosinophil # : x  Auto Basophil # : x  Auto Neutrophil % : x  Auto Lymphocyte % : x  Auto Monocyte % : x  Auto Eosinophil % : x  Auto Basophil % : x    10-16    136  |  104  |  22  ----------------------------<  88  4.5   |  24  |  0.84    Ca    9.7      16 Oct 2021 07:30        < from: CT Head No Cont (10.04.21 @ 09:07) >      EXAM:  CT CERVICAL SPINE                          EXAM:  CT BRAIN                                PROCEDURE DATE:  10/04/2021            INTERPRETATION:  CLINICAL STATEMENT: Trauma..    TECHNIQUE: CT of the head and cervical spine were performed withoutIV contrast. 3-D/MIP images obtained    COMPARISON: None.    FINDINGS:  Head:  There is mild diffuse parenchymal volume loss. There are areas of low attenuation in the periventricular white matter likely related to mild chronic microvascular ischemicchanges.    There is no acute intracranial hemorrhage, parenchymal mass, mass effect or midline shift. There is no acute territorial infarct. There is no hydrocephalus. Partial empty sella    The cranium is intact.    Mild mucosal thickening right maxillary sinus    Cervical spine:  Vertebral body heights intact. Straightening of the cervical lordosis. Grade 1 retrolisthesis C3 on C4. Grade 1 anterolisthesis of C5 on C6    There is no prevertebral soft tissue swelling. The odontoid is intact.    Evaluation of the spinal canal is limited on a CT exam.  Multilevel degenerative changes noted resulting in multilevel spinal canal stenosis and neural foraminal narrowing.    Partially visualized cervical lymphadenopathy noted with multiple enlarged lymph nodes. Calcifications noted within the thyroid gland.    IMPRESSION:  No acute intracranial hemorrhage.    No acute fracture cervical spine. If pain persists, follow-up MRI exam recommended    Partially visualized cervical lymphadenopathy suspicious for lymphoma or metastatic disease. Correlate clinically    --- End of Report ---                ROBI RUTH MD; Attending Radiologist  This document has been electronically signed. Oct  4 2021  9:22AM    < end of copied text >      < from: CT Chest No Cont (10.04.21 @ 20:17) >    EXAM:  CT CHEST                            PROCEDURE DATE:  10/04/2021            INTERPRETATION:  CLINICAL INFORMATION: Shortness of breath, evaluate for pneumonia    COMPARISON: CT abdomen and pelvis 10/4/2021    CONTRAST/COMPLICATIONS:  IV Contrast: None  Oral Contrast: None  Complications: No immediate complication    PROCEDURE:  CT of the Chest was performed.  Sagittal and coronal reformats were performed.    FINDINGS:    LUNGS AND AIRWAYS: Patent central airways.  Calcified nodule abutting the left anterior pleura (series 4, image 57). 3 mm nodule in the left upper lobe (series 4, image 39). Additional 2 mm nodule in the left upper lobe (series 4, image 58).  PLEURA: No pleural effusion.  MEDIASTINUM AND ERICA: Multiple enlarged supraclavicular lymph nodes. Additional axillary and mediastinal lymphadenopathy is seen.  VESSELS: Within normal limits.  HEART: Heart size is normal. No pericardial effusion.  CHEST WALL AND LOWER NECK: Calcifications within the bilateral thyroid lobes.  VISUALIZED UPPER ABDOMEN: Please refer to prior CT abdomen/pelvis.  BONES: Degenerative changes of the spine.    IMPRESSION:  Lymphadenoapthy described above is consistent with CLL as per patient history.    No CT evidence for pneumonia.                --- End of Report ---              KADEEM YE MD; Resident Radiology  This document has been electronically signed.  REYMUNDO VARGAS MD; Attending Radiologist  This document has been electronically signed. Oct  5 2021 12:09PM    < end of copied text >  < from: CT Abdomen and Pelvis w/ IV Cont (10.04.21 @ 09:30) >    EXAM:  CT ABDOMEN AND PELVIS IC                            PROCEDURE DATE:  10/04/2021            INTERPRETATION:  CLINICAL INFORMATION: Left lower quadrant pain    COMPARISON: None.    CONTRAST/COMPLICATIONS:  IV Contrast: Omnipaque 350  90 cc administered   10 cc discarded  Oral Contrast: None  Complications: None    PROCEDURE:  CT of the Abdomen and Pelvis was performed.  Sagittal and coronal reformats were performed.    FINDINGS:  LOWER CHEST: Bibasilar atelectasis. Partially imaged enlarged right axillary lymph nodes.    LIVER: Within normal limits.  BILE DUCTS: Normal caliber.  GALLBLADDER: Within normal limits.  SPLEEN: Within normal limits.  PANCREAS: Fatty atrophy of the pancreas without cutoff. No ductal dilatation or cutoff.  ADRENALS: Within normal limits.  KIDNEYS/URETERS: Within normal limits.    BLADDER/REPRODUCTIVE ORGANS: Multiloculated right adnexal cystic mass measuring 14.6 x 9.0 cm, which abuts the appendix. Left adnexal cyst measures 2.1 x 1.4 cm. Uterus is unremarkable.    BOWEL: No bowel obstruction. Appendix is normal. Colonic diverticulosis.  PERITONEUM: 2 small nodules are present adjacent to the cystic lesion measuring 4 and 6 mm in diameter, respectively (2:75 and 79), possibly peritoneal nodules or small pericolonic lymph nodes.  VESSELS: Within normal limits.  RETROPERITONEUM/LYMPH NODES: Enlarged bilateral inguinal, iliac, mesenteric, and retroperitoneal lymph nodes.  *  Reference left inguinal lymph node measures up to 4.0 x 2.5 cm (2:93).  *  Reference left periaortic lymph node measures up to 2.5 x 2.3 cm (2:45).  ABDOMINAL WALL: Within normal limits.  BONES: Degenerative changes. Lumbar dextroscoliosis.    IMPRESSION:  Enlarged bilateral inguinal, iliac, mesenteric, and retroperitoneal lymph nodes. Partially imaged enlarged right axillary lymph nodes. Primary consideration is lymphoma. Metastatic disease is not excluded. Dedicated chest imaging is recommended for full evaluation.    Cystic right adnexal mass measuring 14.6 x 9.0 cm, concerning for epithelial neoplasm.      --- End of Report ---      WALTER ALANIS MD; Resident Interventional Radiology  This document has been electronically signed.  DAREN MOHAN MD; Attending Radiologist  This document has been electronically signed. Oct  4 2021 11:14AM    < end of copied text >        < from: MR Head w/wo IV Cont (10.12.21 @ 22:23) >    EXAM:  MR BRAIN WAW IC                            PROCEDURE DATE:  10/12/2021            INTERPRETATION:  INDICATIONS:  Change in mental status. History of CLL with new ovarian mass.    TECHNIQUE:  Multiplanar imaging was performed using T1 weighted, T2 weighted and FLAIR sequences.  Diffusion weighted and susceptibility sensitive images were also obtained.  Following intravenous gadolinium, multiplanar T1 weighted images were performed. 7.5 cc Gadavist were administered. 0 cc were discarded.    COMPARISON EXAMINATION:  10/4/2021    FINDINGS: The study is degraded by motion artifact. Repeat sequences were performed.  VENTRICLES AND SULCI:  Prominent in size compatible with age-appropriate volume loss  INTRA-AXIAL:  Patchy areas of white matter T2 hyperintensity are seen compatible with mild microvascular-type changes. No mass effect or abnormal enhancement is seen. No diffusion restriction is seen to suggest acute ischemia.  EXTRA-AXIAL:  There is diffuse bilateral hemispheric mild dural thickening and enhancement demonstrating increased FLAIR signal and mild nodularity.  VISUALIZED SINUSES:  Normal.  VISUALIZED MASTOIDS:  Mild nonspecific soft tissue bilaterally  CALVARIUM:  Normal.  CAROTID FLOW VOIDS:  Present.  MISCELLANEOUS:  Left intraocular lens implant      IMPRESSION:  Mild diffuse dural thickening/enhancement along the hemispheric convexities. While this may be secondary to recent lumbar puncture, other inflammatory or neoplastic etiologies are not excluded. Continued follow-up is advised.    Otherwise no mass effect or acute cerebral ischemia.    --- End of Report ---                UGO SZYMANSKI MD; Attending Radiologist  This document has been electronically signed. Oct 13 2021 10:25AM    < end of copied text >

## 2022-11-25 NOTE — DISCHARGE NOTE NURSING/CASE MANAGEMENT/SOCIAL WORK - NSDCPEFALRISK_GEN_ALL_CORE
For information on Fall & injury Prevention, visit [email or web address]

## 2023-10-03 NOTE — ED ADULT NURSE NOTE - HIV OFFER
Price (Do Not Change): 0.00
Instructions: This plan will send the code [id number] to the PM system.  DO NOT or CHANGE the price.
Detail Level: Zone
Opt out
